# Patient Record
Sex: MALE | Race: WHITE | NOT HISPANIC OR LATINO | Employment: OTHER | URBAN - METROPOLITAN AREA
[De-identification: names, ages, dates, MRNs, and addresses within clinical notes are randomized per-mention and may not be internally consistent; named-entity substitution may affect disease eponyms.]

---

## 2017-02-17 ENCOUNTER — RECORDS - HEALTHEAST (OUTPATIENT)
Dept: LAB | Facility: CLINIC | Age: 57
End: 2017-02-17

## 2017-02-17 LAB
CHOLEST SERPL-MCNC: 192 MG/DL
FASTING STATUS PATIENT QL REPORTED: YES
HDLC SERPL-MCNC: 48 MG/DL
LDLC SERPL CALC-MCNC: 126 MG/DL
TRIGL SERPL-MCNC: 89 MG/DL

## 2018-01-08 ENCOUNTER — RECORDS - HEALTHEAST (OUTPATIENT)
Dept: LAB | Facility: CLINIC | Age: 58
End: 2018-01-08

## 2018-01-08 LAB
ALBUMIN SERPL-MCNC: 3.8 G/DL (ref 3.5–5)
ALBUMIN UR-MCNC: ABNORMAL MG/DL
ALP SERPL-CCNC: 88 U/L (ref 45–120)
ALT SERPL W P-5'-P-CCNC: 42 U/L (ref 0–45)
ANION GAP SERPL CALCULATED.3IONS-SCNC: 10 MMOL/L (ref 5–18)
APPEARANCE UR: CLEAR
AST SERPL W P-5'-P-CCNC: 17 U/L (ref 0–40)
BACTERIA #/AREA URNS HPF: ABNORMAL HPF
BILIRUB SERPL-MCNC: 0.4 MG/DL (ref 0–1)
BILIRUB UR QL STRIP: NEGATIVE
BUN SERPL-MCNC: 29 MG/DL (ref 8–22)
CALCIUM SERPL-MCNC: 9.3 MG/DL (ref 8.5–10.5)
CHLORIDE BLD-SCNC: 111 MMOL/L (ref 98–107)
CO2 SERPL-SCNC: 18 MMOL/L (ref 22–31)
COLOR UR AUTO: YELLOW
CREAT SERPL-MCNC: 2.7 MG/DL (ref 0.7–1.3)
GFR SERPL CREATININE-BSD FRML MDRD: 25 ML/MIN/1.73M2
GLUCOSE BLD-MCNC: 102 MG/DL (ref 70–125)
GLUCOSE UR STRIP-MCNC: NEGATIVE MG/DL
HGB UR QL STRIP: NEGATIVE
KETONES UR STRIP-MCNC: NEGATIVE MG/DL
LEUKOCYTE ESTERASE UR QL STRIP: ABNORMAL
MUCOUS THREADS #/AREA URNS LPF: ABNORMAL LPF
NITRATE UR QL: NEGATIVE
PH UR STRIP: 6 [PH] (ref 4.5–8)
POTASSIUM BLD-SCNC: 5.4 MMOL/L (ref 3.5–5)
PROT SERPL-MCNC: 7.1 G/DL (ref 6–8)
RBC #/AREA URNS AUTO: ABNORMAL HPF
SODIUM SERPL-SCNC: 139 MMOL/L (ref 136–145)
SP GR UR STRIP: 1.01 (ref 1–1.03)
SQUAMOUS #/AREA URNS AUTO: ABNORMAL LPF
UROBILINOGEN UR STRIP-ACNC: ABNORMAL
WBC #/AREA URNS AUTO: ABNORMAL HPF

## 2018-01-09 LAB — BACTERIA SPEC CULT: NO GROWTH

## 2018-02-09 ENCOUNTER — RECORDS - HEALTHEAST (OUTPATIENT)
Dept: LAB | Facility: CLINIC | Age: 58
End: 2018-02-09

## 2018-02-09 LAB
ANION GAP SERPL CALCULATED.3IONS-SCNC: 11 MMOL/L (ref 5–18)
BUN SERPL-MCNC: 41 MG/DL (ref 8–22)
CALCIUM SERPL-MCNC: 9.2 MG/DL (ref 8.5–10.5)
CHLORIDE BLD-SCNC: 112 MMOL/L (ref 98–107)
CO2 SERPL-SCNC: 17 MMOL/L (ref 22–31)
CREAT SERPL-MCNC: 2.99 MG/DL (ref 0.7–1.3)
GFR SERPL CREATININE-BSD FRML MDRD: 22 ML/MIN/1.73M2
GLUCOSE BLD-MCNC: 98 MG/DL (ref 70–125)
POTASSIUM BLD-SCNC: 5.8 MMOL/L (ref 3.5–5)
SODIUM SERPL-SCNC: 140 MMOL/L (ref 136–145)

## 2018-03-22 ENCOUNTER — RECORDS - HEALTHEAST (OUTPATIENT)
Dept: LAB | Facility: CLINIC | Age: 58
End: 2018-03-22

## 2018-03-22 LAB
ALBUMIN SERPL-MCNC: 3.9 G/DL (ref 3.5–5)
ALP SERPL-CCNC: 77 U/L (ref 45–120)
ALT SERPL W P-5'-P-CCNC: 30 U/L (ref 0–45)
ANION GAP SERPL CALCULATED.3IONS-SCNC: 10 MMOL/L (ref 5–18)
AST SERPL W P-5'-P-CCNC: 19 U/L (ref 0–40)
BILIRUB SERPL-MCNC: 0.8 MG/DL (ref 0–1)
BUN SERPL-MCNC: 37 MG/DL (ref 8–22)
CALCIUM SERPL-MCNC: 9.5 MG/DL (ref 8.5–10.5)
CHLORIDE BLD-SCNC: 112 MMOL/L (ref 98–107)
CO2 SERPL-SCNC: 19 MMOL/L (ref 22–31)
CREAT SERPL-MCNC: 2.86 MG/DL (ref 0.7–1.3)
GFR SERPL CREATININE-BSD FRML MDRD: 23 ML/MIN/1.73M2
GLUCOSE BLD-MCNC: 97 MG/DL (ref 70–125)
POTASSIUM BLD-SCNC: 4.7 MMOL/L (ref 3.5–5)
PROT SERPL-MCNC: 7.1 G/DL (ref 6–8)
SODIUM SERPL-SCNC: 141 MMOL/L (ref 136–145)

## 2018-03-26 LAB — BACTERIA SPEC CULT: ABNORMAL

## 2018-04-11 ENCOUNTER — RECORDS - HEALTHEAST (OUTPATIENT)
Dept: LAB | Facility: CLINIC | Age: 58
End: 2018-04-11

## 2018-04-12 LAB — BACTERIA SPEC CULT: NO GROWTH

## 2018-06-08 ENCOUNTER — RECORDS - HEALTHEAST (OUTPATIENT)
Dept: LAB | Facility: CLINIC | Age: 58
End: 2018-06-08

## 2018-06-08 LAB
ANION GAP SERPL CALCULATED.3IONS-SCNC: 13 MMOL/L (ref 5–18)
BUN SERPL-MCNC: 30 MG/DL (ref 8–22)
CALCIUM SERPL-MCNC: 9.3 MG/DL (ref 8.5–10.5)
CHLORIDE BLD-SCNC: 112 MMOL/L (ref 98–107)
CO2 SERPL-SCNC: 16 MMOL/L (ref 22–31)
CREAT SERPL-MCNC: 3.12 MG/DL (ref 0.7–1.3)
GFR SERPL CREATININE-BSD FRML MDRD: 21 ML/MIN/1.73M2
GLUCOSE BLD-MCNC: 110 MG/DL (ref 70–125)
POTASSIUM BLD-SCNC: 5 MMOL/L (ref 3.5–5)
SODIUM SERPL-SCNC: 141 MMOL/L (ref 136–145)

## 2018-11-02 ENCOUNTER — RECORDS - HEALTHEAST (OUTPATIENT)
Dept: LAB | Facility: CLINIC | Age: 58
End: 2018-11-02

## 2018-11-02 LAB
ANION GAP SERPL CALCULATED.3IONS-SCNC: 9 MMOL/L (ref 5–18)
BUN SERPL-MCNC: 36 MG/DL (ref 8–22)
CALCIUM SERPL-MCNC: 8.9 MG/DL (ref 8.5–10.5)
CHLORIDE BLD-SCNC: 112 MMOL/L (ref 98–107)
CO2 SERPL-SCNC: 19 MMOL/L (ref 22–31)
CREAT SERPL-MCNC: 3.1 MG/DL (ref 0.7–1.3)
GFR SERPL CREATININE-BSD FRML MDRD: 21 ML/MIN/1.73M2
GLUCOSE BLD-MCNC: 108 MG/DL (ref 70–125)
POTASSIUM BLD-SCNC: 4.6 MMOL/L (ref 3.5–5)
SODIUM SERPL-SCNC: 140 MMOL/L (ref 136–145)

## 2019-01-23 ENCOUNTER — RECORDS - HEALTHEAST (OUTPATIENT)
Dept: LAB | Facility: CLINIC | Age: 59
End: 2019-01-23

## 2019-01-23 LAB
ANION GAP SERPL CALCULATED.3IONS-SCNC: 10 MMOL/L (ref 5–18)
BUN SERPL-MCNC: 25 MG/DL (ref 8–22)
CALCIUM SERPL-MCNC: 9.2 MG/DL (ref 8.5–10.5)
CHLORIDE BLD-SCNC: 109 MMOL/L (ref 98–107)
CO2 SERPL-SCNC: 19 MMOL/L (ref 22–31)
CREAT SERPL-MCNC: 3.7 MG/DL (ref 0.7–1.3)
GFR SERPL CREATININE-BSD FRML MDRD: 17 ML/MIN/1.73M2
GLUCOSE BLD-MCNC: 101 MG/DL (ref 70–125)
POTASSIUM BLD-SCNC: 5.6 MMOL/L (ref 3.5–5)
SODIUM SERPL-SCNC: 138 MMOL/L (ref 136–145)

## 2019-04-19 ENCOUNTER — RECORDS - HEALTHEAST (OUTPATIENT)
Dept: LAB | Facility: CLINIC | Age: 59
End: 2019-04-19

## 2019-04-19 LAB
ALBUMIN SERPL-MCNC: 3.8 G/DL (ref 3.5–5)
ALBUMIN SERPL-MCNC: 3.8 G/DL (ref 3.5–5)
ALP SERPL-CCNC: 72 U/L (ref 45–120)
ALT SERPL W P-5'-P-CCNC: <9 U/L (ref 0–45)
ANION GAP SERPL CALCULATED.3IONS-SCNC: 11 MMOL/L (ref 5–18)
AST SERPL W P-5'-P-CCNC: 13 U/L (ref 0–40)
BASOPHILS # BLD AUTO: 0.1 THOU/UL (ref 0–0.2)
BASOPHILS NFR BLD AUTO: 1 % (ref 0–2)
BILIRUB DIRECT SERPL-MCNC: 0.1 MG/DL
BILIRUB SERPL-MCNC: 0.4 MG/DL (ref 0–1)
BUN SERPL-MCNC: 36 MG/DL (ref 8–22)
CALCIUM SERPL-MCNC: 9.5 MG/DL (ref 8.5–10.5)
CHLORIDE BLD-SCNC: 113 MMOL/L (ref 98–107)
CO2 SERPL-SCNC: 16 MMOL/L (ref 22–31)
CREAT SERPL-MCNC: 3.15 MG/DL (ref 0.7–1.3)
CREAT UR-MCNC: 95.3 MG/DL
EOSINOPHIL # BLD AUTO: 0.2 THOU/UL (ref 0–0.4)
EOSINOPHIL NFR BLD AUTO: 2 % (ref 0–6)
ERYTHROCYTE [DISTWIDTH] IN BLOOD BY AUTOMATED COUNT: 13.4 % (ref 11–14.5)
GFR SERPL CREATININE-BSD FRML MDRD: 20 ML/MIN/1.73M2
GLUCOSE BLD-MCNC: 100 MG/DL (ref 70–125)
HCT VFR BLD AUTO: 42.8 % (ref 40–54)
HGB BLD-MCNC: 13.4 G/DL (ref 14–18)
LYMPHOCYTES # BLD AUTO: 2.3 THOU/UL (ref 0.8–4.4)
LYMPHOCYTES NFR BLD AUTO: 25 % (ref 20–40)
MCH RBC QN AUTO: 29.3 PG (ref 27–34)
MCHC RBC AUTO-ENTMCNC: 31.3 G/DL (ref 32–36)
MCV RBC AUTO: 93 FL (ref 80–100)
MONOCYTES # BLD AUTO: 0.6 THOU/UL (ref 0–0.9)
MONOCYTES NFR BLD AUTO: 7 % (ref 2–10)
NEUTROPHILS # BLD AUTO: 5.8 THOU/UL (ref 2–7.7)
NEUTROPHILS NFR BLD AUTO: 65 % (ref 50–70)
PHOSPHATE SERPL-MCNC: 3.8 MG/DL (ref 2.5–4.5)
PLATELET # BLD AUTO: 276 THOU/UL (ref 140–440)
PMV BLD AUTO: 9.7 FL (ref 8.5–12.5)
POTASSIUM BLD-SCNC: 5.2 MMOL/L (ref 3.5–5)
PROT SERPL-MCNC: 6.9 G/DL (ref 6–8)
PROTEIN, RANDOM URINE - HISTORICAL: 15 MG/DL
PROTEIN/CREAT RATIO, RANDOM UR: 0.16
PTH-INTACT SERPL-MCNC: 249 PG/ML (ref 10–86)
RBC # BLD AUTO: 4.58 MILL/UL (ref 4.4–6.2)
SODIUM SERPL-SCNC: 140 MMOL/L (ref 136–145)
WBC: 9 THOU/UL (ref 4–11)

## 2019-04-22 LAB — 25(OH)D3 SERPL-MCNC: 35.1 NG/ML (ref 30–80)

## 2019-06-26 ENCOUNTER — RECORDS - HEALTHEAST (OUTPATIENT)
Dept: ADMINISTRATIVE | Facility: OTHER | Age: 59
End: 2019-06-26

## 2019-07-02 ENCOUNTER — AMBULATORY - HEALTHEAST (OUTPATIENT)
Dept: SURGERY | Facility: CLINIC | Age: 59
End: 2019-07-02

## 2019-07-02 RX ORDER — SODIUM BICARBONATE 650 MG/1
650 TABLET ORAL 3 TIMES DAILY
Status: SHIPPED | COMMUNITY
Start: 2019-07-02

## 2019-07-02 RX ORDER — LISINOPRIL 20 MG/1
20 TABLET ORAL DAILY
Status: SHIPPED | COMMUNITY
Start: 2019-07-02 | End: 2024-01-01

## 2019-07-02 RX ORDER — METOPROLOL SUCCINATE 50 MG/1
50 TABLET, EXTENDED RELEASE ORAL DAILY
Status: SHIPPED | COMMUNITY
Start: 2019-07-02 | End: 2024-01-01

## 2019-07-02 RX ORDER — AMLODIPINE BESYLATE 10 MG/1
10 TABLET ORAL DAILY
Status: SHIPPED | COMMUNITY
Start: 2019-07-02

## 2019-07-12 ENCOUNTER — OFFICE VISIT - HEALTHEAST (OUTPATIENT)
Dept: SURGERY | Facility: CLINIC | Age: 59
End: 2019-07-12

## 2019-07-12 DIAGNOSIS — K40.90 LEFT INGUINAL HERNIA: ICD-10-CM

## 2019-07-12 ASSESSMENT — MIFFLIN-ST. JEOR: SCORE: 1372.38

## 2020-05-04 ENCOUNTER — RECORDS - HEALTHEAST (OUTPATIENT)
Dept: LAB | Facility: CLINIC | Age: 60
End: 2020-05-04

## 2020-05-04 LAB
ALBUMIN SERPL-MCNC: 4.1 G/DL (ref 3.5–5)
ALBUMIN UR-MCNC: ABNORMAL MG/DL
ANION GAP SERPL CALCULATED.3IONS-SCNC: 11 MMOL/L (ref 5–18)
APPEARANCE UR: ABNORMAL
BACTERIA #/AREA URNS HPF: ABNORMAL HPF
BILIRUB UR QL STRIP: NEGATIVE
BUN SERPL-MCNC: 26 MG/DL (ref 8–22)
CALCIUM SERPL-MCNC: 9.8 MG/DL (ref 8.5–10.5)
CHLORIDE BLD-SCNC: 112 MMOL/L (ref 98–107)
CO2 SERPL-SCNC: 18 MMOL/L (ref 22–31)
COLOR UR AUTO: ABNORMAL
CREAT SERPL-MCNC: 2.76 MG/DL (ref 0.7–1.3)
CREAT UR-MCNC: 71 MG/DL
GFR SERPL CREATININE-BSD FRML MDRD: 24 ML/MIN/1.73M2
GLUCOSE BLD-MCNC: 103 MG/DL (ref 70–125)
GLUCOSE UR STRIP-MCNC: NEGATIVE MG/DL
HGB BLD-MCNC: 14.7 G/DL (ref 14–18)
HGB UR QL STRIP: NEGATIVE
KETONES UR STRIP-MCNC: NEGATIVE MG/DL
LEUKOCYTE ESTERASE UR QL STRIP: ABNORMAL
MICROALBUMIN UR-MCNC: 2.7 MG/DL (ref 0–1.99)
MICROALBUMIN/CREAT UR: 38 MG/G
NITRATE UR QL: NEGATIVE
PH UR STRIP: 6 [PH] (ref 4.5–8)
PHOSPHATE SERPL-MCNC: 2.6 MG/DL (ref 2.5–4.5)
POTASSIUM BLD-SCNC: 5.7 MMOL/L (ref 3.5–5)
PTH-INTACT SERPL-MCNC: 212 PG/ML (ref 10–86)
RBC #/AREA URNS AUTO: ABNORMAL HPF
SODIUM SERPL-SCNC: 141 MMOL/L (ref 136–145)
SP GR UR STRIP: 1.01 (ref 1–1.03)
SQUAMOUS #/AREA URNS AUTO: ABNORMAL LPF
UROBILINOGEN UR STRIP-ACNC: ABNORMAL
WBC #/AREA URNS AUTO: ABNORMAL HPF
WBC CLUMPS #/AREA URNS HPF: PRESENT /[HPF]

## 2020-07-09 ENCOUNTER — RECORDS - HEALTHEAST (OUTPATIENT)
Dept: LAB | Facility: CLINIC | Age: 60
End: 2020-07-09

## 2020-07-09 LAB
ALBUMIN SERPL-MCNC: 3.8 G/DL (ref 3.5–5)
ANION GAP SERPL CALCULATED.3IONS-SCNC: 10 MMOL/L (ref 5–18)
BUN SERPL-MCNC: 35 MG/DL (ref 8–22)
CALCIUM SERPL-MCNC: 9.7 MG/DL (ref 8.5–10.5)
CHLORIDE BLD-SCNC: 112 MMOL/L (ref 98–107)
CO2 SERPL-SCNC: 17 MMOL/L (ref 22–31)
CREAT SERPL-MCNC: 3.14 MG/DL (ref 0.7–1.3)
GFR SERPL CREATININE-BSD FRML MDRD: 20 ML/MIN/1.73M2
GLUCOSE BLD-MCNC: 119 MG/DL (ref 70–125)
PHOSPHATE SERPL-MCNC: 4.4 MG/DL (ref 2.5–4.5)
POTASSIUM BLD-SCNC: 5.8 MMOL/L (ref 3.5–5)
SODIUM SERPL-SCNC: 139 MMOL/L (ref 136–145)

## 2020-07-21 ENCOUNTER — RECORDS - HEALTHEAST (OUTPATIENT)
Dept: LAB | Facility: CLINIC | Age: 60
End: 2020-07-21

## 2020-07-21 LAB
ALBUMIN SERPL-MCNC: 3.9 G/DL (ref 3.5–5)
ANION GAP SERPL CALCULATED.3IONS-SCNC: 9 MMOL/L (ref 5–18)
BUN SERPL-MCNC: 47 MG/DL (ref 8–22)
CALCIUM SERPL-MCNC: 9.6 MG/DL (ref 8.5–10.5)
CHLORIDE BLD-SCNC: 113 MMOL/L (ref 98–107)
CO2 SERPL-SCNC: 16 MMOL/L (ref 22–31)
CREAT SERPL-MCNC: 3.58 MG/DL (ref 0.7–1.3)
GFR SERPL CREATININE-BSD FRML MDRD: 18 ML/MIN/1.73M2
GLUCOSE BLD-MCNC: 173 MG/DL (ref 70–125)
PHOSPHATE SERPL-MCNC: 3.6 MG/DL (ref 2.5–4.5)
POTASSIUM BLD-SCNC: 5.2 MMOL/L (ref 3.5–5)
SODIUM SERPL-SCNC: 138 MMOL/L (ref 136–145)

## 2020-09-25 ENCOUNTER — RECORDS - HEALTHEAST (OUTPATIENT)
Dept: LAB | Facility: CLINIC | Age: 60
End: 2020-09-25

## 2020-09-25 LAB
ALBUMIN SERPL-MCNC: 3.7 G/DL (ref 3.5–5)
ANION GAP SERPL CALCULATED.3IONS-SCNC: 9 MMOL/L (ref 5–18)
BUN SERPL-MCNC: 30 MG/DL (ref 8–22)
CALCIUM SERPL-MCNC: 8.9 MG/DL (ref 8.5–10.5)
CHLORIDE BLD-SCNC: 111 MMOL/L (ref 98–107)
CO2 SERPL-SCNC: 20 MMOL/L (ref 22–31)
CREAT SERPL-MCNC: 3.49 MG/DL (ref 0.7–1.3)
GFR SERPL CREATININE-BSD FRML MDRD: 18 ML/MIN/1.73M2
GLUCOSE BLD-MCNC: 90 MG/DL (ref 70–125)
PHOSPHATE SERPL-MCNC: 3.3 MG/DL (ref 2.5–4.5)
POTASSIUM BLD-SCNC: 4.6 MMOL/L (ref 3.5–5)
SODIUM SERPL-SCNC: 140 MMOL/L (ref 136–145)

## 2020-11-06 ENCOUNTER — RECORDS - HEALTHEAST (OUTPATIENT)
Dept: LAB | Facility: CLINIC | Age: 60
End: 2020-11-06

## 2020-11-06 LAB
ALBUMIN SERPL-MCNC: 3.8 G/DL (ref 3.5–5)
ANION GAP SERPL CALCULATED.3IONS-SCNC: 11 MMOL/L (ref 5–18)
BUN SERPL-MCNC: 34 MG/DL (ref 8–22)
CALCIUM SERPL-MCNC: 9 MG/DL (ref 8.5–10.5)
CHLORIDE BLD-SCNC: 112 MMOL/L (ref 98–107)
CHOLEST SERPL-MCNC: 187 MG/DL
CO2 SERPL-SCNC: 19 MMOL/L (ref 22–31)
CREAT SERPL-MCNC: 3.32 MG/DL (ref 0.7–1.3)
FASTING STATUS PATIENT QL REPORTED: NO
GFR SERPL CREATININE-BSD FRML MDRD: 19 ML/MIN/1.73M2
GLUCOSE BLD-MCNC: 86 MG/DL (ref 70–125)
HDLC SERPL-MCNC: 53 MG/DL
LDLC SERPL CALC-MCNC: 110 MG/DL
PHOSPHATE SERPL-MCNC: 3.8 MG/DL (ref 2.5–4.5)
POTASSIUM BLD-SCNC: 4.5 MMOL/L (ref 3.5–5)
SODIUM SERPL-SCNC: 142 MMOL/L (ref 136–145)
TRIGL SERPL-MCNC: 119 MG/DL

## 2021-05-27 ENCOUNTER — RECORDS - HEALTHEAST (OUTPATIENT)
Dept: ADMINISTRATIVE | Facility: CLINIC | Age: 61
End: 2021-05-27

## 2021-05-30 ENCOUNTER — RECORDS - HEALTHEAST (OUTPATIENT)
Dept: ADMINISTRATIVE | Facility: CLINIC | Age: 61
End: 2021-05-30

## 2021-05-30 NOTE — PROGRESS NOTES
"HPI:  This is a 58 y.o. male here today with concerns of pain and bulging in his left groin area. He has noted this for the past few month(s). The symptoms have progressed and increased over this time. He comes in for evaluation secondary to the hernia causing enough issues to bother them with daily activities or chores.    Allergies:Patient has no known allergies.    History reviewed. No pertinent past medical history.    Past Surgical History:   Procedure Laterality Date     BPH w/LUTS  02/02/2018     COLONOSCOPY       Cysto Dilate Stricture  02/13/2018     DIsc repair  12/2003     Elevated PSA  2012     Incomplete Bladder Emptying  2013     TRANSURETHRAL RESECTION OF PROSTATE  2013     trauma urethral stricture  02/13/2018       CURRENT MEDS:      Family History   Problem Relation Age of Onset     Arthritis Mother      Asthma Sister      Heart disease Brother         reports that he has been smoking.  He has never used smokeless tobacco. He reports that he drinks alcohol. He reports that he does not use drugs.    Review of Systems - Negative except left groin, bulge and pain. Otherwise twelve system of review is negative.      Vitals:    07/12/19 1118   BP: 116/74   Pulse: 64   Resp: 14   SpO2: 96%   Weight: 125 lb (56.7 kg)   Height: 5' 9\" (1.753 m)       Body mass index is 18.46 kg/m .    EXAM:  General: NAD   HEENT: normocephalic, PERRLA and EOMS intact  Mounth: Mucus membranes moist  Neck: Supple  Chest: Clear to auscultation bilaterally  CV: RRR  ABD: Soft nontender and nondistended, left inguinal hernia, reducible  EXT: Warm, pulses intact,   Neuro: Alert and oriented x3  Back: no CVA tenderness      IMAGES: none    Assessment/Plan: Pt with a left inguinal hernia. I discussed this at length with He.  I went over conservative management as well as surgical treatment of this.. I would plan on doing this via anlaparoscopic  approach with possible use of mesh. I went over the small risks of surgery including " but not limited to bleeding and infection, anesthesia, recurrence rates and nerve injury. I discussed the expected recovery time as well. Will get this scheduled. He will contact us to have this scheduled.      Lucas Allen MD  [facility] Department of Surgery

## 2021-06-03 VITALS — BODY MASS INDEX: 18.51 KG/M2 | WEIGHT: 125 LBS | HEIGHT: 69 IN

## 2021-06-19 NOTE — LETTER
Letter by Lucas Allen MD at      Author: Lucas Allen MD Service: -- Author Type: --    Filed:  Encounter Date: 7/12/2019 Status: (Other)         Fausto Vicente MD  [address] [place] [address]                                  July 29, 2019    Patient: Elie Prado   MR Number: [id number]   YOB: 1960   Date of Visit: 7/12/2019     Dear Dr. Cinthia MD:    Thank you for referring Elie Prado to me for evaluation. Below are the relevant portions of my assessment and plan of care.    If you have questions, please do not hesitate to call me. I look forward to following Elie along with you.    Sincerely,        Lucas Allen MD          CC  No Recipients  Lucas Allen MD  7/29/2019  8:53 AM  Sign at close encounter  HPI:  This is a 58 y.o. male here today with concerns of pain and bulging in his left groin area. He has noted this for the past few month(s). The symptoms have progressed and increased over this time. He comes in for evaluation secondary to the hernia causing enough issues to bother them with daily activities or chores.    Allergies:Patient has no known allergies.    History reviewed. No pertinent past medical history.    Past Surgical History:   Procedure Laterality Date   ? BPH w/LUTS  02/02/2018   ? COLONOSCOPY     ? Cysto Dilate Stricture  02/13/2018   ? DIsc repair  12/2003   ? Elevated PSA  2012   ? Incomplete Bladder Emptying  2013   ? TRANSURETHRAL RESECTION OF PROSTATE  2013   ? trauma urethral stricture  02/13/2018       CURRENT MEDS:      Family History   Problem Relation Age of Onset   ? Arthritis Mother    ? Asthma Sister    ? Heart disease Brother         reports that he has been smoking.  He has never used smokeless tobacco. He reports that he drinks alcohol. He reports that he does not use drugs.    Review of Systems - Negative except left groin, bulge and pain. Otherwise twelve system of review is negative.      Vitals:    07/12/19  "1118   BP: 116/74   Pulse: 64   Resp: 14   SpO2: 96%   Weight: 125 lb (56.7 kg)   Height: 5' 9\" (1.753 m)       Body mass index is 18.46 kg/m .    EXAM:  General: NAD   HEENT: normocephalic, PERRLA and EOMS intact  Mounth: Mucus membranes moist  Neck: Supple  Chest: Clear to auscultation bilaterally  CV: RRR  ABD: Soft nontender and nondistended, left inguinal hernia, reducible  EXT: Warm, pulses intact,   Neuro: Alert and oriented x3  Back: no CVA tenderness      IMAGES: none    Assessment/Plan: Pt with a left inguinal hernia. I discussed this at length with He.  I went over conservative management as well as surgical treatment of this.. I would plan on doing this via anlaparoscopic  approach with possible use of mesh. I went over the small risks of surgery including but not limited to bleeding and infection, anesthesia, recurrence rates and nerve injury. I discussed the expected recovery time as well. Will get this scheduled. He will contact us to have this scheduled.      Lucas Allen MD  [facility] Department of Surgery       "

## 2021-07-14 ENCOUNTER — LAB REQUISITION (OUTPATIENT)
Dept: LAB | Facility: CLINIC | Age: 61
End: 2021-07-14

## 2021-07-14 DIAGNOSIS — I12.9 HYPERTENSIVE CHRONIC KIDNEY DISEASE WITH STAGE 1 THROUGH STAGE 4 CHRONIC KIDNEY DISEASE, OR UNSPECIFIED CHRONIC KIDNEY DISEASE: ICD-10-CM

## 2021-07-14 DIAGNOSIS — R82.998 OTHER ABNORMAL FINDINGS IN URINE: ICD-10-CM

## 2021-07-14 LAB
ANION GAP SERPL CALCULATED.3IONS-SCNC: 13 MMOL/L (ref 5–18)
BUN SERPL-MCNC: 26 MG/DL (ref 8–22)
CALCIUM SERPL-MCNC: 8.8 MG/DL (ref 8.5–10.5)
CHLORIDE BLD-SCNC: 109 MMOL/L (ref 98–107)
CHOLEST SERPL-MCNC: 143 MG/DL
CO2 SERPL-SCNC: 17 MMOL/L (ref 22–31)
CREAT SERPL-MCNC: 3.97 MG/DL (ref 0.7–1.3)
GFR SERPL CREATININE-BSD FRML MDRD: 15 ML/MIN/1.73M2
GLUCOSE BLD-MCNC: 100 MG/DL (ref 70–125)
HDLC SERPL-MCNC: 46 MG/DL
LDLC SERPL CALC-MCNC: 82 MG/DL
POTASSIUM BLD-SCNC: 4.6 MMOL/L (ref 3.5–5)
SODIUM SERPL-SCNC: 139 MMOL/L (ref 136–145)
TRIGL SERPL-MCNC: 75 MG/DL

## 2021-07-14 PROCEDURE — 80061 LIPID PANEL: CPT | Performed by: FAMILY MEDICINE

## 2021-07-14 PROCEDURE — 80048 BASIC METABOLIC PNL TOTAL CA: CPT | Performed by: FAMILY MEDICINE

## 2021-07-14 PROCEDURE — 87086 URINE CULTURE/COLONY COUNT: CPT | Performed by: FAMILY MEDICINE

## 2021-07-17 LAB — BACTERIA UR CULT: ABNORMAL

## 2021-10-14 ENCOUNTER — LAB REQUISITION (OUTPATIENT)
Dept: LAB | Facility: CLINIC | Age: 61
End: 2021-10-14

## 2021-10-14 DIAGNOSIS — N18.4 CHRONIC KIDNEY DISEASE, STAGE 4 (SEVERE) (H): ICD-10-CM

## 2021-10-14 LAB
ALBUMIN SERPL-MCNC: 3.7 G/DL (ref 3.5–5)
ANION GAP SERPL CALCULATED.3IONS-SCNC: 11 MMOL/L (ref 5–18)
BUN SERPL-MCNC: 30 MG/DL (ref 8–22)
CALCIUM SERPL-MCNC: 9.5 MG/DL (ref 8.5–10.5)
CHLORIDE BLD-SCNC: 109 MMOL/L (ref 98–107)
CO2 SERPL-SCNC: 18 MMOL/L (ref 22–31)
CREAT SERPL-MCNC: 3.5 MG/DL (ref 0.7–1.3)
GFR SERPL CREATININE-BSD FRML MDRD: 18 ML/MIN/1.73M2
GLUCOSE BLD-MCNC: 85 MG/DL (ref 70–125)
PHOSPHATE SERPL-MCNC: 4.6 MG/DL (ref 2.5–4.5)
POTASSIUM BLD-SCNC: 5.4 MMOL/L (ref 3.5–5)
SODIUM SERPL-SCNC: 138 MMOL/L (ref 136–145)

## 2021-10-14 PROCEDURE — 80069 RENAL FUNCTION PANEL: CPT | Performed by: FAMILY MEDICINE

## 2022-01-03 ENCOUNTER — LAB REQUISITION (OUTPATIENT)
Dept: LAB | Facility: CLINIC | Age: 62
End: 2022-01-03

## 2022-01-03 DIAGNOSIS — I12.9 HYPERTENSIVE CHRONIC KIDNEY DISEASE WITH STAGE 1 THROUGH STAGE 4 CHRONIC KIDNEY DISEASE, OR UNSPECIFIED CHRONIC KIDNEY DISEASE: ICD-10-CM

## 2022-01-03 LAB
ALBUMIN SERPL-MCNC: 3.7 G/DL (ref 3.5–5)
ANION GAP SERPL CALCULATED.3IONS-SCNC: 10 MMOL/L (ref 5–18)
BUN SERPL-MCNC: 37 MG/DL (ref 8–22)
CALCIUM SERPL-MCNC: 9.5 MG/DL (ref 8.5–10.5)
CHLORIDE BLD-SCNC: 107 MMOL/L (ref 98–107)
CO2 SERPL-SCNC: 19 MMOL/L (ref 22–31)
CREAT SERPL-MCNC: 3.49 MG/DL (ref 0.7–1.3)
GFR SERPL CREATININE-BSD FRML MDRD: 19 ML/MIN/1.73M2
GLUCOSE BLD-MCNC: 105 MG/DL (ref 70–125)
PHOSPHATE SERPL-MCNC: 4.4 MG/DL (ref 2.5–4.5)
POTASSIUM BLD-SCNC: 4.9 MMOL/L (ref 3.5–5)
SODIUM SERPL-SCNC: 136 MMOL/L (ref 136–145)

## 2022-01-03 PROCEDURE — 80069 RENAL FUNCTION PANEL: CPT | Performed by: FAMILY MEDICINE

## 2022-07-06 ENCOUNTER — LAB REQUISITION (OUTPATIENT)
Dept: LAB | Facility: CLINIC | Age: 62
End: 2022-07-06

## 2022-07-06 DIAGNOSIS — N18.4 CHRONIC KIDNEY DISEASE, STAGE 4 (SEVERE) (H): ICD-10-CM

## 2022-07-06 LAB
ALBUMIN SERPL BCG-MCNC: 4.3 G/DL (ref 3.5–5.2)
ANION GAP SERPL CALCULATED.3IONS-SCNC: 12 MMOL/L (ref 7–15)
BUN SERPL-MCNC: 20.5 MG/DL (ref 8–23)
CALCIUM SERPL-MCNC: 9.5 MG/DL (ref 8.8–10.2)
CHLORIDE SERPL-SCNC: 106 MMOL/L (ref 98–107)
CREAT SERPL-MCNC: 3.21 MG/DL (ref 0.67–1.17)
DEPRECATED HCO3 PLAS-SCNC: 21 MMOL/L (ref 22–29)
GFR SERPL CREATININE-BSD FRML MDRD: 21 ML/MIN/1.73M2
GLUCOSE SERPL-MCNC: 125 MG/DL (ref 70–99)
PHOSPHATE SERPL-MCNC: 3.5 MG/DL (ref 2.5–4.5)
POTASSIUM SERPL-SCNC: 5.2 MMOL/L (ref 3.4–5.3)
SODIUM SERPL-SCNC: 139 MMOL/L (ref 136–145)

## 2022-07-06 PROCEDURE — 80051 ELECTROLYTE PANEL: CPT | Performed by: FAMILY MEDICINE

## 2023-01-01 ENCOUNTER — LAB REQUISITION (OUTPATIENT)
Dept: LAB | Facility: CLINIC | Age: 63
End: 2023-01-01

## 2023-01-01 DIAGNOSIS — Z12.5 ENCOUNTER FOR SCREENING FOR MALIGNANT NEOPLASM OF PROSTATE: ICD-10-CM

## 2023-01-01 DIAGNOSIS — N18.4 CHRONIC KIDNEY DISEASE, STAGE 4 (SEVERE) (H): ICD-10-CM

## 2023-01-01 DIAGNOSIS — I12.9 HYPERTENSIVE CHRONIC KIDNEY DISEASE WITH STAGE 1 THROUGH STAGE 4 CHRONIC KIDNEY DISEASE, OR UNSPECIFIED CHRONIC KIDNEY DISEASE: ICD-10-CM

## 2023-01-01 DIAGNOSIS — E83.52 HYPERCALCEMIA: ICD-10-CM

## 2023-01-01 DIAGNOSIS — R74.8 ABNORMAL LEVELS OF OTHER SERUM ENZYMES: ICD-10-CM

## 2023-01-01 DIAGNOSIS — E55.9 VITAMIN D DEFICIENCY, UNSPECIFIED: ICD-10-CM

## 2023-01-01 DIAGNOSIS — E78.5 HYPERLIPIDEMIA, UNSPECIFIED: ICD-10-CM

## 2023-01-01 LAB
1,25(OH)2D SERPL-MCNC: <5 PG/ML (ref 19.9–79.3)
ALBUMIN SERPL BCG-MCNC: 3.8 G/DL (ref 3.5–5.2)
ALBUMIN SERPL BCG-MCNC: 4 G/DL (ref 3.5–5.2)
ALP SERPL-CCNC: 294 U/L (ref 40–129)
ALP SERPL-CCNC: 396 U/L (ref 40–129)
ALT SERPL W P-5'-P-CCNC: 133 U/L (ref 0–70)
ALT SERPL W P-5'-P-CCNC: 95 U/L (ref 0–70)
ANION GAP SERPL CALCULATED.3IONS-SCNC: 11 MMOL/L (ref 7–15)
ANION GAP SERPL CALCULATED.3IONS-SCNC: 14 MMOL/L (ref 7–15)
ANION GAP SERPL CALCULATED.3IONS-SCNC: 14 MMOL/L (ref 7–15)
AST SERPL W P-5'-P-CCNC: 40 U/L (ref 0–45)
AST SERPL W P-5'-P-CCNC: 60 U/L (ref 0–45)
BILIRUB SERPL-MCNC: 0.5 MG/DL
BILIRUB SERPL-MCNC: 0.8 MG/DL
BUN SERPL-MCNC: 42 MG/DL (ref 8–23)
BUN SERPL-MCNC: 46.9 MG/DL (ref 8–23)
BUN SERPL-MCNC: 55.6 MG/DL (ref 8–23)
CALCIUM SERPL-MCNC: 11.4 MG/DL (ref 8.8–10.2)
CALCIUM SERPL-MCNC: 11.4 MG/DL (ref 8.8–10.2)
CALCIUM SERPL-MCNC: 11.5 MG/DL (ref 8.8–10.2)
CHLORIDE SERPL-SCNC: 108 MMOL/L (ref 98–107)
CHLORIDE SERPL-SCNC: 108 MMOL/L (ref 98–107)
CHLORIDE SERPL-SCNC: 111 MMOL/L (ref 98–107)
CHOLEST SERPL-MCNC: 130 MG/DL
CREAT SERPL-MCNC: 2.71 MG/DL (ref 0.67–1.17)
CREAT SERPL-MCNC: 3.08 MG/DL (ref 0.67–1.17)
CREAT SERPL-MCNC: 3.08 MG/DL (ref 0.67–1.17)
DEPRECATED HCO3 PLAS-SCNC: 16 MMOL/L (ref 22–29)
DEPRECATED HCO3 PLAS-SCNC: 18 MMOL/L (ref 22–29)
DEPRECATED HCO3 PLAS-SCNC: 20 MMOL/L (ref 22–29)
EGFRCR SERPLBLD CKD-EPI 2021: 22 ML/MIN/1.73M2
EGFRCR SERPLBLD CKD-EPI 2021: 22 ML/MIN/1.73M2
EGFRCR SERPLBLD CKD-EPI 2021: 26 ML/MIN/1.73M2
GLUCOSE SERPL-MCNC: 118 MG/DL (ref 70–99)
GLUCOSE SERPL-MCNC: 120 MG/DL (ref 70–99)
GLUCOSE SERPL-MCNC: 120 MG/DL (ref 70–99)
HCV AB SERPL QL IA: NONREACTIVE
HDLC SERPL-MCNC: 64 MG/DL
LDLC SERPL CALC-MCNC: 56 MG/DL
NONHDLC SERPL-MCNC: 66 MG/DL
POTASSIUM SERPL-SCNC: 4.6 MMOL/L (ref 3.4–5.3)
POTASSIUM SERPL-SCNC: 5.4 MMOL/L (ref 3.4–5.3)
POTASSIUM SERPL-SCNC: 5.4 MMOL/L (ref 3.4–5.3)
PROT SERPL-MCNC: 6.6 G/DL (ref 6.4–8.3)
PROT SERPL-MCNC: 6.8 G/DL (ref 6.4–8.3)
PSA SERPL DL<=0.01 NG/ML-MCNC: 2.49 NG/ML (ref 0–4.5)
PTH-INTACT SERPL-MCNC: 52 PG/ML (ref 15–65)
SODIUM SERPL-SCNC: 138 MMOL/L (ref 135–145)
SODIUM SERPL-SCNC: 140 MMOL/L (ref 135–145)
SODIUM SERPL-SCNC: 142 MMOL/L (ref 135–145)
TRIGL SERPL-MCNC: 50 MG/DL
VIT D+METAB SERPL-MCNC: 63 NG/ML (ref 20–50)

## 2023-01-01 PROCEDURE — 86803 HEPATITIS C AB TEST: CPT | Performed by: FAMILY MEDICINE

## 2023-01-01 PROCEDURE — 82652 VIT D 1 25-DIHYDROXY: CPT | Performed by: FAMILY MEDICINE

## 2023-01-01 PROCEDURE — 80048 BASIC METABOLIC PNL TOTAL CA: CPT | Performed by: PHYSICIAN ASSISTANT

## 2023-01-01 PROCEDURE — 80061 LIPID PANEL: CPT | Performed by: FAMILY MEDICINE

## 2023-01-01 PROCEDURE — 80053 COMPREHEN METABOLIC PANEL: CPT | Performed by: FAMILY MEDICINE

## 2023-01-01 PROCEDURE — 82306 VITAMIN D 25 HYDROXY: CPT | Performed by: PHYSICIAN ASSISTANT

## 2023-01-01 PROCEDURE — G0103 PSA SCREENING: HCPCS | Performed by: FAMILY MEDICINE

## 2023-01-01 PROCEDURE — 83970 ASSAY OF PARATHORMONE: CPT | Performed by: FAMILY MEDICINE

## 2023-01-12 ENCOUNTER — LAB REQUISITION (OUTPATIENT)
Dept: LAB | Facility: CLINIC | Age: 63
End: 2023-01-12

## 2023-01-12 DIAGNOSIS — I12.9 HYPERTENSIVE CHRONIC KIDNEY DISEASE WITH STAGE 1 THROUGH STAGE 4 CHRONIC KIDNEY DISEASE, OR UNSPECIFIED CHRONIC KIDNEY DISEASE: ICD-10-CM

## 2023-01-12 DIAGNOSIS — E78.5 HYPERLIPIDEMIA, UNSPECIFIED: ICD-10-CM

## 2023-01-12 LAB
ALBUMIN SERPL BCG-MCNC: 4.1 G/DL (ref 3.5–5.2)
ANION GAP SERPL CALCULATED.3IONS-SCNC: 12 MMOL/L (ref 7–15)
BUN SERPL-MCNC: 32.5 MG/DL (ref 8–23)
CALCIUM SERPL-MCNC: 9.3 MG/DL (ref 8.8–10.2)
CHLORIDE SERPL-SCNC: 107 MMOL/L (ref 98–107)
CHOLEST SERPL-MCNC: 189 MG/DL
CREAT SERPL-MCNC: 3.21 MG/DL (ref 0.67–1.17)
DEPRECATED HCO3 PLAS-SCNC: 16 MMOL/L (ref 22–29)
GFR SERPL CREATININE-BSD FRML MDRD: 21 ML/MIN/1.73M2
GLUCOSE SERPL-MCNC: 105 MG/DL (ref 70–99)
HDLC SERPL-MCNC: 67 MG/DL
LDLC SERPL CALC-MCNC: 106 MG/DL
NONHDLC SERPL-MCNC: 122 MG/DL
PHOSPHATE SERPL-MCNC: 4.7 MG/DL (ref 2.5–4.5)
POTASSIUM SERPL-SCNC: 5.6 MMOL/L (ref 3.4–5.3)
SODIUM SERPL-SCNC: 135 MMOL/L (ref 136–145)
TRIGL SERPL-MCNC: 81 MG/DL

## 2023-01-12 PROCEDURE — 80061 LIPID PANEL: CPT | Performed by: FAMILY MEDICINE

## 2023-01-12 PROCEDURE — 80069 RENAL FUNCTION PANEL: CPT | Performed by: FAMILY MEDICINE

## 2023-04-13 ENCOUNTER — LAB REQUISITION (OUTPATIENT)
Dept: LAB | Facility: CLINIC | Age: 63
End: 2023-04-13

## 2023-04-13 DIAGNOSIS — E78.5 HYPERLIPIDEMIA, UNSPECIFIED: ICD-10-CM

## 2023-04-13 DIAGNOSIS — I12.9 HYPERTENSIVE CHRONIC KIDNEY DISEASE WITH STAGE 1 THROUGH STAGE 4 CHRONIC KIDNEY DISEASE, OR UNSPECIFIED CHRONIC KIDNEY DISEASE: ICD-10-CM

## 2023-04-13 DIAGNOSIS — E44.1 MILD PROTEIN-CALORIE MALNUTRITION (H): ICD-10-CM

## 2023-04-13 LAB
ALBUMIN SERPL BCG-MCNC: 4.4 G/DL (ref 3.5–5.2)
ALP SERPL-CCNC: 71 U/L (ref 40–129)
ALT SERPL W P-5'-P-CCNC: 10 U/L (ref 10–50)
ANION GAP SERPL CALCULATED.3IONS-SCNC: 15 MMOL/L (ref 7–15)
AST SERPL W P-5'-P-CCNC: 14 U/L (ref 10–50)
BILIRUB SERPL-MCNC: 0.4 MG/DL
BUN SERPL-MCNC: 23.4 MG/DL (ref 8–23)
CALCIUM SERPL-MCNC: 9.8 MG/DL (ref 8.8–10.2)
CHLORIDE SERPL-SCNC: 104 MMOL/L (ref 98–107)
CHOLEST SERPL-MCNC: 200 MG/DL
CREAT SERPL-MCNC: 3.27 MG/DL (ref 0.67–1.17)
DEPRECATED HCO3 PLAS-SCNC: 17 MMOL/L (ref 22–29)
GFR SERPL CREATININE-BSD FRML MDRD: 21 ML/MIN/1.73M2
GLUCOSE SERPL-MCNC: 103 MG/DL (ref 70–99)
HDLC SERPL-MCNC: 48 MG/DL
LDLC SERPL CALC-MCNC: 127 MG/DL
NONHDLC SERPL-MCNC: 152 MG/DL
POTASSIUM SERPL-SCNC: 5 MMOL/L (ref 3.4–5.3)
PROT SERPL-MCNC: 7 G/DL (ref 6.4–8.3)
SODIUM SERPL-SCNC: 136 MMOL/L (ref 136–145)
TRIGL SERPL-MCNC: 124 MG/DL

## 2023-04-13 PROCEDURE — 80053 COMPREHEN METABOLIC PANEL: CPT | Performed by: FAMILY MEDICINE

## 2023-04-13 PROCEDURE — 80061 LIPID PANEL: CPT | Performed by: FAMILY MEDICINE

## 2024-01-01 ENCOUNTER — ANESTHESIA EVENT (OUTPATIENT)
Dept: SURGERY | Facility: HOSPITAL | Age: 64
End: 2024-01-01
Payer: COMMERCIAL

## 2024-01-01 ENCOUNTER — HOSPITAL ENCOUNTER (INPATIENT)
Facility: CLINIC | Age: 64
LOS: 1 days | End: 2024-04-21
Admitting: INTERNAL MEDICINE
Payer: COMMERCIAL

## 2024-01-01 ENCOUNTER — APPOINTMENT (OUTPATIENT)
Dept: CARDIOLOGY | Facility: CLINIC | Age: 64
End: 2024-01-01
Attending: STUDENT IN AN ORGANIZED HEALTH CARE EDUCATION/TRAINING PROGRAM
Payer: COMMERCIAL

## 2024-01-01 ENCOUNTER — PRE VISIT (OUTPATIENT)
Dept: ENDOCRINOLOGY | Facility: CLINIC | Age: 64
End: 2024-01-01

## 2024-01-01 ENCOUNTER — ANESTHESIA (OUTPATIENT)
Dept: SURGERY | Facility: HOSPITAL | Age: 64
End: 2024-01-01
Payer: COMMERCIAL

## 2024-01-01 ENCOUNTER — APPOINTMENT (OUTPATIENT)
Dept: ULTRASOUND IMAGING | Facility: CLINIC | Age: 64
End: 2024-01-01
Attending: PHYSICIAN ASSISTANT
Payer: COMMERCIAL

## 2024-01-01 ENCOUNTER — HOSPITAL ENCOUNTER (OUTPATIENT)
Facility: HOSPITAL | Age: 64
Discharge: HOME | End: 2024-03-08
Attending: INTERNAL MEDICINE | Admitting: INTERNAL MEDICINE
Payer: COMMERCIAL

## 2024-01-01 ENCOUNTER — APPOINTMENT (OUTPATIENT)
Dept: GENERAL RADIOLOGY | Facility: CLINIC | Age: 64
End: 2024-01-01
Attending: STUDENT IN AN ORGANIZED HEALTH CARE EDUCATION/TRAINING PROGRAM
Payer: COMMERCIAL

## 2024-01-01 ENCOUNTER — TELEPHONE (OUTPATIENT)
Dept: ENDOCRINOLOGY | Facility: CLINIC | Age: 64
End: 2024-01-01

## 2024-01-01 ENCOUNTER — APPOINTMENT (OUTPATIENT)
Dept: CT IMAGING | Facility: CLINIC | Age: 64
End: 2024-01-01
Payer: COMMERCIAL

## 2024-01-01 ENCOUNTER — ANESTHESIA EVENT (OUTPATIENT)
Dept: SURGERY | Facility: CLINIC | Age: 64
End: 2024-01-01
Payer: COMMERCIAL

## 2024-01-01 ENCOUNTER — APPOINTMENT (OUTPATIENT)
Dept: CARDIOLOGY | Facility: CLINIC | Age: 64
End: 2024-01-01
Payer: COMMERCIAL

## 2024-01-01 ENCOUNTER — APPOINTMENT (OUTPATIENT)
Dept: ULTRASOUND IMAGING | Facility: CLINIC | Age: 64
End: 2024-01-01
Payer: COMMERCIAL

## 2024-01-01 ENCOUNTER — APPOINTMENT (OUTPATIENT)
Dept: NEUROLOGY | Facility: CLINIC | Age: 64
End: 2024-01-01
Payer: COMMERCIAL

## 2024-01-01 ENCOUNTER — APPOINTMENT (OUTPATIENT)
Dept: GENERAL RADIOLOGY | Facility: CLINIC | Age: 64
End: 2024-01-01
Payer: COMMERCIAL

## 2024-01-01 ENCOUNTER — VIRTUAL VISIT (OUTPATIENT)
Dept: ENDOCRINOLOGY | Facility: CLINIC | Age: 64
End: 2024-01-01
Payer: COMMERCIAL

## 2024-01-01 ENCOUNTER — HOSPITAL ENCOUNTER (OUTPATIENT)
Dept: CT IMAGING | Facility: CLINIC | Age: 64
Discharge: HOME | End: 2024-01-22
Attending: INTERNAL MEDICINE | Admitting: INTERNAL MEDICINE
Payer: COMMERCIAL

## 2024-01-01 ENCOUNTER — LAB REQUISITION (OUTPATIENT)
Dept: LAB | Facility: CLINIC | Age: 64
End: 2024-01-01

## 2024-01-01 ENCOUNTER — APPOINTMENT (OUTPATIENT)
Dept: RADIOLOGY | Facility: HOSPITAL | Age: 64
End: 2024-01-01
Attending: INTERNAL MEDICINE
Payer: COMMERCIAL

## 2024-01-01 ENCOUNTER — TRANSFERRED RECORDS (OUTPATIENT)
Dept: HEALTH INFORMATION MANAGEMENT | Facility: CLINIC | Age: 64
End: 2024-01-01
Payer: COMMERCIAL

## 2024-01-01 ENCOUNTER — ANESTHESIA (OUTPATIENT)
Dept: SURGERY | Facility: CLINIC | Age: 64
End: 2024-01-01
Payer: COMMERCIAL

## 2024-01-01 ENCOUNTER — TRANSCRIBE ORDERS (OUTPATIENT)
Dept: OTHER | Age: 64
End: 2024-01-01

## 2024-01-01 VITALS
SYSTOLIC BLOOD PRESSURE: 118 MMHG | BODY MASS INDEX: 14.34 KG/M2 | HEIGHT: 69 IN | RESPIRATION RATE: 17 BRPM | WEIGHT: 96.8 LBS | OXYGEN SATURATION: 99 % | DIASTOLIC BLOOD PRESSURE: 68 MMHG | HEART RATE: 108 BPM | TEMPERATURE: 98.6 F

## 2024-01-01 DIAGNOSIS — N18.4 CHRONIC KIDNEY DISEASE, STAGE IV (SEVERE) (H): ICD-10-CM

## 2024-01-01 DIAGNOSIS — R63.4 ABNORMAL WEIGHT LOSS: ICD-10-CM

## 2024-01-01 DIAGNOSIS — E83.52 HYPERCALCEMIA: ICD-10-CM

## 2024-01-01 DIAGNOSIS — I46.9 CARDIAC ARREST (H): ICD-10-CM

## 2024-01-01 DIAGNOSIS — N18.9 ANEMIA IN CHRONIC RENAL DISEASE: ICD-10-CM

## 2024-01-01 DIAGNOSIS — N18.4 CHRONIC KIDNEY DISEASE, STAGE 4 (SEVERE) (H): Primary | ICD-10-CM

## 2024-01-01 DIAGNOSIS — D63.1 ANEMIA IN CHRONIC RENAL DISEASE: ICD-10-CM

## 2024-01-01 DIAGNOSIS — D63.1 ANEMIA IN CHRONIC KIDNEY DISEASE: ICD-10-CM

## 2024-01-01 DIAGNOSIS — I10 HYPERTENSIVE DISORDER: ICD-10-CM

## 2024-01-01 DIAGNOSIS — N18.4 CHRONIC KIDNEY DISEASE, STAGE 4 (SEVERE) (H): ICD-10-CM

## 2024-01-01 DIAGNOSIS — Z01.818 ENCOUNTER FOR OTHER PREPROCEDURAL EXAMINATION: ICD-10-CM

## 2024-01-01 DIAGNOSIS — E87.5 HYPERKALEMIA: ICD-10-CM

## 2024-01-01 DIAGNOSIS — E05.90 HYPERTHYROIDISM: ICD-10-CM

## 2024-01-01 DIAGNOSIS — N13.1 HYDRONEPHROSIS WITH URETERAL STRICTURE: ICD-10-CM

## 2024-01-01 DIAGNOSIS — N18.9 ANEMIA IN CHRONIC KIDNEY DISEASE: ICD-10-CM

## 2024-01-01 LAB
ABO/RH(D): NORMAL
ABO/RH(D): NORMAL
ACT BLD: 178 SECONDS (ref 74–150)
ACT BLD: 193 SECONDS (ref 74–150)
ACT BLD: 197 SECONDS (ref 74–150)
ACT BLD: 209 SECONDS (ref 74–150)
ACT BLD: 209 SECONDS (ref 74–150)
ACT BLD: 267 SECONDS (ref 74–150)
ACT BLD: 356 SECONDS (ref 74–150)
ALBUMIN MFR UR ELPH: 176 MG/DL
ALBUMIN SERPL BCG-MCNC: 1.4 G/DL (ref 3.5–5.2)
ALBUMIN SERPL BCG-MCNC: 1.6 G/DL (ref 3.5–5.2)
ALBUMIN SERPL BCG-MCNC: 2 G/DL (ref 3.5–5.2)
ALBUMIN SERPL BCG-MCNC: 2.1 G/DL (ref 3.5–5.2)
ALBUMIN SERPL BCG-MCNC: 2.3 G/DL (ref 3.5–5.2)
ALBUMIN SERPL BCG-MCNC: 2.5 G/DL (ref 3.5–5.2)
ALBUMIN SERPL BCG-MCNC: 2.6 G/DL (ref 3.5–5.2)
ALBUMIN SERPL BCG-MCNC: 2.6 G/DL (ref 3.5–5.2)
ALBUMIN UR-MCNC: 300 MG/DL
ALBUMIN UR-MCNC: 70 MG/DL
ALLEN'S TEST: ABNORMAL
ALP SERPL-CCNC: 119 U/L (ref 40–150)
ALP SERPL-CCNC: 122 U/L (ref 40–150)
ALP SERPL-CCNC: 128 U/L (ref 40–150)
ALP SERPL-CCNC: 150 U/L (ref 40–150)
ALP SERPL-CCNC: 69 U/L (ref 40–150)
ALP SERPL-CCNC: 96 U/L (ref 40–150)
ALP SERPL-CCNC: 97 U/L (ref 40–150)
ALT SERPL W P-5'-P-CCNC: 125 U/L (ref 0–70)
ALT SERPL W P-5'-P-CCNC: 1278 U/L (ref 0–70)
ALT SERPL W P-5'-P-CCNC: 1331 U/L (ref 0–70)
ALT SERPL W P-5'-P-CCNC: 72 U/L (ref 0–70)
ALT SERPL W P-5'-P-CCNC: 89 U/L (ref 0–70)
ALT SERPL W P-5'-P-CCNC: 892 U/L (ref 0–70)
ALT SERPL W P-5'-P-CCNC: 98 U/L (ref 0–70)
AMPHETAMINES UR QL SCN: ABNORMAL
ANION GAP SERPL CALCULATED.3IONS-SCNC: 13 MMOL/L (ref 7–15)
ANION GAP SERPL CALCULATED.3IONS-SCNC: 14 MMOL/L (ref 7–15)
ANION GAP SERPL CALCULATED.3IONS-SCNC: 15 MMOL/L (ref 7–15)
ANION GAP SERPL CALCULATED.3IONS-SCNC: 18 MMOL/L (ref 7–15)
ANION GAP SERPL CALCULATED.3IONS-SCNC: 21 MMOL/L (ref 7–15)
ANION GAP SERPL CALCULATED.3IONS-SCNC: 21 MMOL/L (ref 7–15)
ANION GAP SERPL CALCULATED.3IONS-SCNC: 22 MMOL/L (ref 7–15)
ANION GAP SERPL CALCULATED.3IONS-SCNC: 24 MMOL/L (ref 7–15)
ANION GAP SERPL CALCULATED.3IONS-SCNC: 30 MMOL/L (ref 7–15)
ANTIBODY SCREEN: NEGATIVE
ANTIBODY SCREEN: NEGATIVE
APPEARANCE UR: ABNORMAL
APPEARANCE UR: ABNORMAL
APTT PPP: 36 SECONDS (ref 22–38)
APTT PPP: 48 SECONDS (ref 22–38)
APTT PPP: 59 SECONDS (ref 22–38)
APTT PPP: 80 SECONDS (ref 22–38)
APTT PPP: 97 SECONDS (ref 22–38)
AST SERPL W P-5'-P-CCNC: 191 U/L (ref 0–45)
AST SERPL W P-5'-P-CCNC: 2472 U/L (ref 0–45)
AST SERPL W P-5'-P-CCNC: 266 U/L (ref 0–45)
AST SERPL W P-5'-P-CCNC: 2984 U/L (ref 0–45)
AST SERPL W P-5'-P-CCNC: 299 U/L (ref 0–45)
AST SERPL W P-5'-P-CCNC: 3214 U/L (ref 0–45)
AST SERPL W P-5'-P-CCNC: 368 U/L (ref 0–45)
AT III ACT/NOR PPP CHRO: 86 % (ref 85–135)
BARBITURATES UR QL SCN: ABNORMAL
BASE EXCESS BLDA CALC-SCNC: -1.9 MMOL/L (ref -3–3)
BASE EXCESS BLDA CALC-SCNC: -10.4 MMOL/L (ref -3–3)
BASE EXCESS BLDA CALC-SCNC: -12.2 MMOL/L (ref -3–3)
BASE EXCESS BLDA CALC-SCNC: -16.8 MMOL/L (ref -3–3)
BASE EXCESS BLDA CALC-SCNC: -3.7 MMOL/L (ref -3–3)
BASE EXCESS BLDA CALC-SCNC: -5.9 MMOL/L (ref -3–3)
BASE EXCESS BLDA CALC-SCNC: -6 MMOL/L (ref -3–3)
BASE EXCESS BLDA CALC-SCNC: -6.3 MMOL/L (ref -3–3)
BASE EXCESS BLDA CALC-SCNC: -6.3 MMOL/L (ref -3–3)
BASE EXCESS BLDA CALC-SCNC: -6.5 MMOL/L (ref -3–3)
BASE EXCESS BLDA CALC-SCNC: -7.9 MMOL/L (ref -3–3)
BASE EXCESS BLDA CALC-SCNC: -8.1 MMOL/L (ref -3–3)
BASE EXCESS BLDA CALC-SCNC: -8.2 MMOL/L (ref -3–3)
BASE EXCESS BLDA CALC-SCNC: -8.3 MMOL/L (ref -3–3)
BASE EXCESS BLDA CALC-SCNC: -9.5 MMOL/L (ref -3–3)
BASE EXCESS BLDA CALC-SCNC: -9.7 MMOL/L (ref -3–3)
BASE EXCESS BLDA CALC-SCNC: -9.7 MMOL/L (ref -3–3)
BASE EXCESS BLDA CALC-SCNC: -9.8 MMOL/L (ref -3–3)
BASE EXCESS BLDA CALC-SCNC: -9.8 MMOL/L (ref -3–3)
BASE EXCESS BLDA CALC-SCNC: 0 MMOL/L (ref -3–3)
BASE EXCESS BLDA CALC-SCNC: 0.1 MMOL/L (ref -3–3)
BASE EXCESS BLDA CALC-SCNC: 14.4 MMOL/L (ref -3–3)
BASE EXCESS BLDA CALC-SCNC: 16 MMOL/L (ref -3–3)
BASE EXCESS BLDA CALC-SCNC: 16.2 MMOL/L (ref -3–3)
BASE EXCESS BLDA CALC-SCNC: 18.2 MMOL/L (ref -3–3)
BASE EXCESS BLDA CALC-SCNC: 2.2 MMOL/L (ref -3–3)
BASE EXCESS BLDA CALC-SCNC: 2.3 MMOL/L (ref -3–3)
BASE EXCESS BLDA CALC-SCNC: 27.7 MMOL/L (ref -3–3)
BASE EXCESS BLDA CALC-SCNC: 3.5 MMOL/L (ref -3–3)
BASE EXCESS BLDA CALC-SCNC: 4.9 MMOL/L (ref -3–3)
BASE EXCESS BLDA CALC-SCNC: 8.7 MMOL/L (ref -3–3)
BASE EXCESS BLDA CALC-SCNC: 9.3 MMOL/L (ref -3–3)
BASE EXCESS BLDA CALC-SCNC: 9.3 MMOL/L (ref -3–3)
BASE EXCESS BLDA CALC-SCNC: 9.7 MMOL/L (ref -3–3)
BASE EXCESS BLDV CALC-SCNC: -12.1 MMOL/L (ref -3–3)
BASE EXCESS BLDV CALC-SCNC: -7.3 MMOL/L (ref -3–3)
BASE EXCESS BLDV CALC-SCNC: -9.1 MMOL/L (ref -3–3)
BASE EXCESS BLDV CALC-SCNC: -9.3 MMOL/L (ref -3–3)
BASOPHILS # BLD AUTO: 0 10E3/UL (ref 0–0.2)
BASOPHILS NFR BLD AUTO: 0 %
BENZODIAZ UR QL SCN: ABNORMAL
BILIRUB SERPL-MCNC: 0.5 MG/DL
BILIRUB SERPL-MCNC: 0.8 MG/DL
BILIRUB SERPL-MCNC: 0.9 MG/DL
BILIRUB SERPL-MCNC: 1 MG/DL
BILIRUB SERPL-MCNC: 1.1 MG/DL
BILIRUB SERPL-MCNC: 1.2 MG/DL
BILIRUB SERPL-MCNC: 1.5 MG/DL
BILIRUB UR QL STRIP: NEGATIVE
BILIRUB UR QL STRIP: NEGATIVE
BLD PROD TYP BPU: NORMAL
BLOOD COMPONENT TYPE: NORMAL
BUN SERPL-MCNC: 44.7 MG/DL (ref 8–23)
BUN SERPL-MCNC: 44.9 MG/DL (ref 8–23)
BUN SERPL-MCNC: 47.7 MG/DL (ref 8–23)
BUN SERPL-MCNC: 52.1 MG/DL (ref 8–23)
BUN SERPL-MCNC: 54.9 MG/DL (ref 8–23)
BUN SERPL-MCNC: 58.6 MG/DL (ref 8–23)
BUN SERPL-MCNC: 61 MG/DL (ref 8–23)
BUN SERPL-MCNC: 61.4 MG/DL (ref 8–23)
BUN SERPL-MCNC: 62.7 MG/DL (ref 8–23)
BZE UR QL SCN: ABNORMAL
CA-I BLD-MCNC: 3.9 MG/DL (ref 4.4–5.2)
CA-I BLD-MCNC: 4.4 MG/DL (ref 4.4–5.2)
CA-I BLD-MCNC: 4.4 MG/DL (ref 4.4–5.2)
CA-I BLD-MCNC: 4.7 MG/DL (ref 4.4–5.2)
CA-I BLD-MCNC: 5 MG/DL (ref 4.4–5.2)
CA-I BLD-MCNC: 5 MG/DL (ref 4.4–5.2)
CA-I BLD-MCNC: 5.1 MG/DL (ref 4.4–5.2)
CA-I BLD-MCNC: 5.1 MG/DL (ref 4.4–5.2)
CA-I BLD-MCNC: 5.2 MG/DL (ref 4.4–5.2)
CA-I BLD-MCNC: 5.3 MG/DL (ref 4.4–5.2)
CA-I BLD-MCNC: 5.3 MG/DL (ref 4.4–5.2)
CA-I BLD-MCNC: 5.4 MG/DL (ref 4.4–5.2)
CA-I BLD-MCNC: 5.6 MG/DL (ref 4.4–5.2)
CA-I BLD-MCNC: 7.3 MG/DL (ref 4.4–5.2)
CALCIUM SERPL-MCNC: 11.7 MG/DL (ref 8.8–10.2)
CALCIUM SERPL-MCNC: 19.2 MG/DL (ref 8.8–10.2)
CALCIUM SERPL-MCNC: 9 MG/DL (ref 8.2–9.6)
CALCIUM SERPL-MCNC: 9 MG/DL (ref 8.2–9.6)
CALCIUM SERPL-MCNC: 9.3 MG/DL (ref 8.8–10.2)
CALCIUM SERPL-MCNC: 9.3 MG/DL (ref 8.8–10.2)
CALCIUM SERPL-MCNC: 9.7 MG/DL (ref 8.2–9.6)
CALCIUM SERPL-MCNC: 9.7 MG/DL (ref 8.2–9.6)
CALCIUM SERPL-MCNC: 9.9 MG/DL (ref 8.8–10.2)
CANNABINOIDS UR QL SCN: ABNORMAL
CF REDUC 30M P MA P HEP LENFR BLD TEG: 0 % (ref 0–8)
CF REDUC 60M P MA P HEPASE LENFR BLD TEG: 0.8 % (ref 0–15)
CFT P HPASE BLD TEG: 2 MINUTE (ref 1–3)
CHLORIDE SERPL-SCNC: 106 MMOL/L (ref 98–107)
CHLORIDE SERPL-SCNC: 108 MMOL/L (ref 98–107)
CHLORIDE SERPL-SCNC: 110 MMOL/L (ref 98–107)
CHLORIDE SERPL-SCNC: 112 MMOL/L (ref 98–107)
CHLORIDE SERPL-SCNC: 114 MMOL/L (ref 98–107)
CHOLEST SERPL-MCNC: 90 MG/DL
CI (COAGULATION INDEX)(Z): -1.3 (ref -3–3)
CK SERPL-CCNC: 1407 U/L (ref 39–308)
CK SERPL-CCNC: 4138 U/L (ref 39–308)
CK SERPL-CCNC: 4459 U/L (ref 39–308)
CK SERPL-CCNC: 5574 U/L (ref 39–308)
CK SERPL-CCNC: 6551 U/L (ref 39–308)
CK SERPL-CCNC: 7945 U/L (ref 39–308)
CK SERPL-CCNC: ABNORMAL U/L (ref 39–308)
CLOT ANGLE P HPASE BLD TEG: 63.8 DEGREES (ref 53–72)
CLOT INIT P HPASE BLD TEG: 9.2 MINUTE (ref 5–10)
CLOT STRENGTH P HPASE BLD TEG: 10.8 KD/SC (ref 4.5–11)
CODING SYSTEM: NORMAL
COHGB MFR BLD: 100 % (ref 95–96)
COHGB MFR BLD: 100 % (ref 95–96)
COHGB MFR BLD: 88.8 % (ref 96–97)
COHGB MFR BLD: 92.1 % (ref 96–97)
COHGB MFR BLD: 92.7 % (ref 95–96)
COHGB MFR BLD: 93.4 % (ref 96–97)
COHGB MFR BLD: 93.7 % (ref 95–96)
COHGB MFR BLD: 96.4 % (ref 96–97)
COHGB MFR BLD: 96.9 % (ref 95–96)
COHGB MFR BLD: 97.6 % (ref 95–96)
COHGB MFR BLD: 98.3 % (ref 95–96)
COHGB MFR BLD: 98.4 % (ref 96–97)
COHGB MFR BLD: 99 % (ref 96–97)
COHGB MFR BLD: 99.4 % (ref 95–96)
COHGB MFR BLD: 99.5 % (ref 95–96)
COHGB MFR BLD: 99.6 % (ref 96–97)
COHGB MFR BLD: 99.6 % (ref 96–97)
COHGB MFR BLD: 99.8 % (ref 95–96)
COHGB MFR BLD: 99.8 % (ref 96–97)
COHGB MFR BLD: 99.9 % (ref 96–97)
COHGB MFR BLD: 99.9 % (ref 96–97)
COHGB MFR BLD: >100 % (ref 95–96)
COHGB MFR BLD: >100 % (ref 95–96)
COHGB MFR BLD: >100 % (ref 96–97)
COHGB MFR BLD: >100 % (ref 96–97)
COLOR UR AUTO: ABNORMAL
COLOR UR AUTO: ABNORMAL
CORTIS SERPL-MCNC: 21.4 UG/DL
CPB POCT: NO
CREAT SERPL-MCNC: 2.6 MG/DL (ref 0.67–1.17)
CREAT SERPL-MCNC: 2.64 MG/DL (ref 0.67–1.17)
CREAT SERPL-MCNC: 2.79 MG/DL (ref 0.67–1.17)
CREAT SERPL-MCNC: 2.92 MG/DL (ref 0.67–1.17)
CREAT SERPL-MCNC: 3.18 MG/DL (ref 0.67–1.17)
CREAT SERPL-MCNC: 3.59 MG/DL (ref 0.67–1.17)
CREAT SERPL-MCNC: 3.7 MG/DL (ref 0.67–1.17)
CREAT SERPL-MCNC: 3.8 MG/DL (ref 0.67–1.17)
CREAT SERPL-MCNC: 3.9 MG/DL (ref 0.67–1.17)
CREAT UR-MCNC: 20.1 MG/DL
CROSSMATCH: NORMAL
CRP SERPL-MCNC: 72.7 MG/L
CRP SERPL-MCNC: 9.89 MG/L
D DIMER PPP FEU-MCNC: 13.66 UG/ML FEU (ref 0–0.5)
D DIMER PPP FEU-MCNC: >20 UG/ML FEU (ref 0–0.5)
DEPRECATED HCO3 PLAS-SCNC: 14 MMOL/L (ref 22–29)
DEPRECATED HCO3 PLAS-SCNC: 16 MMOL/L (ref 22–29)
DEPRECATED HCO3 PLAS-SCNC: 17 MMOL/L (ref 22–29)
DEPRECATED HCO3 PLAS-SCNC: 17 MMOL/L (ref 22–29)
DEPRECATED HCO3 PLAS-SCNC: 18 MMOL/L (ref 22–29)
DEPRECATED HCO3 PLAS-SCNC: 26 MMOL/L (ref 22–29)
DEPRECATED HCO3 PLAS-SCNC: 30 MMOL/L (ref 22–29)
DEPRECATED HCO3 PLAS-SCNC: 32 MMOL/L (ref 22–29)
DEPRECATED HCO3 PLAS-SCNC: 34 MMOL/L (ref 22–29)
EGFRCR SERPLBLD CKD-EPI 2021: 14 ML/MIN/1.73M2
EGFRCR SERPLBLD CKD-EPI 2021: 17 ML/MIN/1.73M2
EGFRCR SERPLBLD CKD-EPI 2021: 18 ML/MIN/1.73M2
EGFRCR SERPLBLD CKD-EPI 2021: 24 ML/MIN/1.73M2
EOSINOPHIL # BLD AUTO: 0 10E3/UL (ref 0–0.7)
EOSINOPHIL NFR BLD AUTO: 0 %
ERCP: NORMAL
ERYTHROCYTE [DISTWIDTH] IN BLOOD BY AUTOMATED COUNT: 14.5 % (ref 10–15)
ERYTHROCYTE [DISTWIDTH] IN BLOOD BY AUTOMATED COUNT: 14.6 % (ref 10–15)
ERYTHROCYTE [DISTWIDTH] IN BLOOD BY AUTOMATED COUNT: 14.8 % (ref 10–15)
ERYTHROCYTE [DISTWIDTH] IN BLOOD BY AUTOMATED COUNT: 14.9 % (ref 10–15)
ERYTHROCYTE [DISTWIDTH] IN BLOOD BY AUTOMATED COUNT: 15.2 % (ref 10–15)
ERYTHROCYTE [DISTWIDTH] IN BLOOD BY AUTOMATED COUNT: 15.3 % (ref 10–15)
ERYTHROCYTE [DISTWIDTH] IN BLOOD BY AUTOMATED COUNT: 16.1 % (ref 10–15)
ERYTHROCYTE [SEDIMENTATION RATE] IN BLOOD BY WESTERGREN METHOD: 21 MM/HR (ref 0–20)
ERYTHROCYTE [SEDIMENTATION RATE] IN BLOOD BY WESTERGREN METHOD: 28 MM/HR (ref 0–20)
FENTANYL UR QL: ABNORMAL
FIBRINOGEN PPP-MCNC: 309 MG/DL (ref 170–490)
FIBRINOGEN PPP-MCNC: 384 MG/DL (ref 170–490)
FIBRINOGEN PPP-MCNC: 485 MG/DL (ref 170–490)
GLUCOSE BLD-MCNC: 105 MG/DL (ref 70–99)
GLUCOSE BLD-MCNC: 112 MG/DL (ref 70–99)
GLUCOSE BLD-MCNC: 115 MG/DL (ref 70–99)
GLUCOSE BLD-MCNC: 149 MG/DL (ref 70–99)
GLUCOSE BLD-MCNC: 156 MG/DL (ref 70–99)
GLUCOSE BLD-MCNC: 205 MG/DL (ref 70–99)
GLUCOSE BLD-MCNC: 33 MG/DL (ref 70–99)
GLUCOSE BLD-MCNC: 39 MG/DL (ref 70–99)
GLUCOSE BLD-MCNC: 541 MG/DL (ref 70–99)
GLUCOSE BLD-MCNC: 67 MG/DL (ref 70–99)
GLUCOSE BLD-MCNC: 75 MG/DL (ref 70–99)
GLUCOSE BLD-MCNC: 78 MG/DL (ref 70–99)
GLUCOSE BLD-MCNC: 82 MG/DL (ref 70–99)
GLUCOSE BLDC GLUCOMTR-MCNC: 100 MG/DL (ref 70–99)
GLUCOSE BLDC GLUCOMTR-MCNC: 105 MG/DL (ref 70–99)
GLUCOSE BLDC GLUCOMTR-MCNC: 112 MG/DL (ref 70–99)
GLUCOSE BLDC GLUCOMTR-MCNC: 117 MG/DL (ref 70–99)
GLUCOSE BLDC GLUCOMTR-MCNC: 127 MG/DL (ref 70–99)
GLUCOSE BLDC GLUCOMTR-MCNC: 136 MG/DL (ref 70–99)
GLUCOSE BLDC GLUCOMTR-MCNC: 141 MG/DL (ref 70–99)
GLUCOSE BLDC GLUCOMTR-MCNC: 144 MG/DL (ref 70–99)
GLUCOSE BLDC GLUCOMTR-MCNC: 156 MG/DL (ref 70–99)
GLUCOSE BLDC GLUCOMTR-MCNC: 168 MG/DL (ref 70–99)
GLUCOSE BLDC GLUCOMTR-MCNC: 176 MG/DL (ref 70–99)
GLUCOSE BLDC GLUCOMTR-MCNC: 192 MG/DL (ref 70–99)
GLUCOSE BLDC GLUCOMTR-MCNC: 197 MG/DL (ref 70–99)
GLUCOSE BLDC GLUCOMTR-MCNC: 198 MG/DL (ref 70–99)
GLUCOSE BLDC GLUCOMTR-MCNC: 198 MG/DL (ref 70–99)
GLUCOSE BLDC GLUCOMTR-MCNC: 199 MG/DL (ref 70–99)
GLUCOSE BLDC GLUCOMTR-MCNC: 200 MG/DL (ref 70–99)
GLUCOSE BLDC GLUCOMTR-MCNC: 204 MG/DL (ref 70–99)
GLUCOSE BLDC GLUCOMTR-MCNC: 230 MG/DL (ref 70–99)
GLUCOSE BLDC GLUCOMTR-MCNC: 241 MG/DL (ref 70–99)
GLUCOSE BLDC GLUCOMTR-MCNC: 263 MG/DL (ref 70–99)
GLUCOSE BLDC GLUCOMTR-MCNC: 27 MG/DL (ref 70–99)
GLUCOSE BLDC GLUCOMTR-MCNC: 35 MG/DL (ref 70–99)
GLUCOSE BLDC GLUCOMTR-MCNC: 462 MG/DL (ref 70–99)
GLUCOSE BLDC GLUCOMTR-MCNC: 58 MG/DL (ref 70–99)
GLUCOSE BLDC GLUCOMTR-MCNC: 69 MG/DL (ref 70–99)
GLUCOSE BLDC GLUCOMTR-MCNC: 71 MG/DL (ref 70–99)
GLUCOSE BLDC GLUCOMTR-MCNC: 72 MG/DL (ref 70–99)
GLUCOSE BLDC GLUCOMTR-MCNC: 77 MG/DL (ref 70–99)
GLUCOSE BLDC GLUCOMTR-MCNC: 78 MG/DL (ref 70–99)
GLUCOSE BLDC GLUCOMTR-MCNC: 78 MG/DL (ref 70–99)
GLUCOSE BLDC GLUCOMTR-MCNC: 83 MG/DL (ref 70–99)
GLUCOSE BLDC GLUCOMTR-MCNC: 85 MG/DL (ref 70–99)
GLUCOSE BLDC GLUCOMTR-MCNC: 86 MG/DL (ref 70–99)
GLUCOSE BLDC GLUCOMTR-MCNC: 90 MG/DL (ref 70–99)
GLUCOSE BLDC GLUCOMTR-MCNC: 94 MG/DL (ref 70–99)
GLUCOSE BLDC GLUCOMTR-MCNC: 95 MG/DL (ref 70–99)
GLUCOSE BLDC GLUCOMTR-MCNC: 98 MG/DL (ref 70–99)
GLUCOSE SERPL-MCNC: 103 MG/DL (ref 70–99)
GLUCOSE SERPL-MCNC: 124 MG/DL (ref 70–99)
GLUCOSE SERPL-MCNC: 127 MG/DL (ref 70–99)
GLUCOSE SERPL-MCNC: 130 MG/DL (ref 70–99)
GLUCOSE SERPL-MCNC: 159 MG/DL (ref 70–99)
GLUCOSE SERPL-MCNC: 192 MG/DL (ref 70–99)
GLUCOSE SERPL-MCNC: 219 MG/DL (ref 70–99)
GLUCOSE SERPL-MCNC: 649 MG/DL (ref 70–99)
GLUCOSE SERPL-MCNC: 85 MG/DL (ref 70–99)
GLUCOSE UR STRIP-MCNC: 30 MG/DL
GLUCOSE UR STRIP-MCNC: NEGATIVE MG/DL
HBA1C MFR BLD: 5.3 %
HCO3 BLD-SCNC: 17 MMOL/L (ref 21–28)
HCO3 BLD-SCNC: 17 MMOL/L (ref 21–28)
HCO3 BLD-SCNC: 18 MMOL/L (ref 21–28)
HCO3 BLD-SCNC: 19 MMOL/L (ref 21–28)
HCO3 BLD-SCNC: 19 MMOL/L (ref 21–28)
HCO3 BLD-SCNC: 20 MMOL/L (ref 21–28)
HCO3 BLD-SCNC: 21 MMOL/L (ref 21–28)
HCO3 BLD-SCNC: 26 MMOL/L (ref 21–28)
HCO3 BLD-SCNC: 27 MMOL/L (ref 21–28)
HCO3 BLD-SCNC: 28 MMOL/L (ref 21–28)
HCO3 BLD-SCNC: 29 MMOL/L (ref 21–28)
HCO3 BLD-SCNC: 31 MMOL/L (ref 21–28)
HCO3 BLD-SCNC: 31 MMOL/L (ref 21–28)
HCO3 BLD-SCNC: 32 MMOL/L (ref 21–28)
HCO3 BLD-SCNC: 33 MMOL/L (ref 21–28)
HCO3 BLD-SCNC: 34 MMOL/L (ref 21–28)
HCO3 BLD-SCNC: 35 MMOL/L (ref 21–28)
HCO3 BLD-SCNC: 38 MMOL/L (ref 21–28)
HCO3 BLD-SCNC: 38 MMOL/L (ref 21–28)
HCO3 BLD-SCNC: 41 MMOL/L (ref 21–28)
HCO3 BLD-SCNC: 57 MMOL/L (ref 21–28)
HCO3 BLDA-SCNC: 10 MMOL/L (ref 21–28)
HCO3 BLDA-SCNC: 15 MMOL/L (ref 21–28)
HCO3 BLDA-SCNC: 18 MMOL/L (ref 21–28)
HCO3 BLDA-SCNC: 20 MMOL/L (ref 21–28)
HCO3 BLDA-SCNC: 20 MMOL/L (ref 21–28)
HCO3 BLDA-SCNC: 31 MMOL/L (ref 21–28)
HCO3 BLDA-SCNC: 33 MMOL/L (ref 21–28)
HCO3 BLDA-SCNC: 42 MMOL/L (ref 21–28)
HCO3 BLDA-SCNC: 46 MMOL/L (ref 21–28)
HCO3 BLDV-SCNC: 18 MMOL/L (ref 21–28)
HCO3 BLDV-SCNC: 19 MMOL/L (ref 21–28)
HCO3 BLDV-SCNC: 21 MMOL/L (ref 21–28)
HCO3 BLDV-SCNC: 22 MMOL/L (ref 21–28)
HCT VFR BLD AUTO: 21.1 % (ref 40–53)
HCT VFR BLD AUTO: 24.8 % (ref 40–53)
HCT VFR BLD AUTO: 25.5 % (ref 40–53)
HCT VFR BLD AUTO: 26.9 % (ref 40–53)
HCT VFR BLD AUTO: 27.3 % (ref 40–53)
HCT VFR BLD AUTO: 33 % (ref 40–53)
HCT VFR BLD AUTO: 33.2 % (ref 40–53)
HCT VFR BLD AUTO: 33.6 % (ref 40–53)
HCT VFR BLD AUTO: 33.7 % (ref 40–53)
HCT VFR BLD CALC: 20 % (ref 40–53)
HDLC SERPL-MCNC: 32 MG/DL
HGB BLD-MCNC: 10.8 G/DL (ref 13.3–17.7)
HGB BLD-MCNC: 11 G/DL (ref 13.3–17.7)
HGB BLD-MCNC: 11.1 G/DL (ref 13.3–17.7)
HGB BLD-MCNC: 11.3 G/DL (ref 13.3–17.7)
HGB BLD-MCNC: 5.8 G/DL (ref 13.3–17.7)
HGB BLD-MCNC: 6.5 G/DL (ref 13.3–17.7)
HGB BLD-MCNC: 6.8 G/DL (ref 13.3–17.7)
HGB BLD-MCNC: 7 G/DL (ref 13.3–17.7)
HGB BLD-MCNC: 7.8 G/DL (ref 13.3–17.7)
HGB BLD-MCNC: 8.9 G/DL (ref 13.3–17.7)
HGB BLD-MCNC: 9 G/DL (ref 13.3–17.7)
HGB BLD-MCNC: 9.1 G/DL (ref 13.3–17.7)
HGB BLD-MCNC: 9.2 G/DL (ref 13.3–17.7)
HGB BLD-MCNC: 9.6 G/DL (ref 13.3–17.7)
HGB BLD-MCNC: 9.9 G/DL (ref 13.3–17.7)
HGB FREE PLAS-MCNC: 30 MG/DL
HGB FREE PLAS-MCNC: 50 MG/DL
HGB UR QL STRIP: ABNORMAL
HGB UR QL STRIP: ABNORMAL
HOLD SPECIMEN: NORMAL
HOLD SPECIMEN: NORMAL
IMM GRANULOCYTES # BLD: 0.3 10E3/UL
IMM GRANULOCYTES NFR BLD: 1 %
INR PPP: 1.35 (ref 0.85–1.15)
INR PPP: 1.4 (ref 0.85–1.15)
INR PPP: 1.48 (ref 0.85–1.15)
INR PPP: 1.78 (ref 0.85–1.15)
INR PPP: 2.41 (ref 0.85–1.15)
ISSUE DATE AND TIME: NORMAL
KETONES UR STRIP-MCNC: NEGATIVE MG/DL
KETONES UR STRIP-MCNC: NEGATIVE MG/DL
LACTATE BLD-SCNC: 10.8 MMOL/L (ref 0.7–2)
LACTATE BLD-SCNC: 12.2 MMOL/L (ref 0.7–2)
LACTATE BLD-SCNC: 12.8 MMOL/L (ref 0.7–2)
LACTATE BLD-SCNC: 13.2 MMOL/L (ref 0.7–2)
LACTATE BLD-SCNC: 13.5 MMOL/L (ref 0.7–2)
LACTATE BLD-SCNC: 7.6 MMOL/L (ref 0.7–2)
LACTATE SERPL-SCNC: 10.9 MMOL/L (ref 0.7–2)
LACTATE SERPL-SCNC: 10.9 MMOL/L (ref 0.7–2)
LACTATE SERPL-SCNC: 11.4 MMOL/L (ref 0.7–2)
LACTATE SERPL-SCNC: 14.5 MMOL/L (ref 0.7–2)
LACTATE SERPL-SCNC: 16 MMOL/L (ref 0.7–2)
LACTATE SERPL-SCNC: 17 MMOL/L (ref 0.7–2)
LACTATE SERPL-SCNC: 18 MMOL/L (ref 0.7–2)
LACTATE SERPL-SCNC: 18 MMOL/L (ref 0.7–2)
LACTATE SERPL-SCNC: 19 MMOL/L (ref 0.7–2)
LACTATE SERPL-SCNC: 24 MMOL/L (ref 0.7–2)
LACTATE SERPL-SCNC: 4 MMOL/L (ref 0.7–2)
LACTATE SERPL-SCNC: 4.1 MMOL/L (ref 0.7–2)
LACTATE SERPL-SCNC: 4.5 MMOL/L (ref 0.7–2)
LACTATE SERPL-SCNC: 4.8 MMOL/L (ref 0.7–2)
LACTATE SERPL-SCNC: 5.2 MMOL/L (ref 0.7–2)
LACTATE SERPL-SCNC: 5.9 MMOL/L (ref 0.7–2)
LACTATE SERPL-SCNC: 6.7 MMOL/L (ref 0.7–2)
LACTATE SERPL-SCNC: 6.7 MMOL/L (ref 0.7–2)
LACTATE SERPL-SCNC: 6.8 MMOL/L (ref 0.7–2)
LACTATE SERPL-SCNC: 7.4 MMOL/L (ref 0.7–2)
LACTATE SERPL-SCNC: 7.6 MMOL/L (ref 0.7–2)
LACTATE SERPL-SCNC: 7.8 MMOL/L (ref 0.7–2)
LACTATE SERPL-SCNC: 9.8 MMOL/L (ref 0.7–2)
LDH SERPL L TO P-CCNC: 533 U/L (ref 0–250)
LDH SERPL L TO P-CCNC: 958 U/L (ref 0–250)
LDLC SERPL CALC-MCNC: 47 MG/DL
LEUKOCYTE ESTERASE UR QL STRIP: ABNORMAL
LEUKOCYTE ESTERASE UR QL STRIP: ABNORMAL
LVEF ECHO: NORMAL
LYMPHOCYTES # BLD AUTO: 1.4 10E3/UL (ref 0.8–5.3)
LYMPHOCYTES NFR BLD AUTO: 6 %
MAGNESIUM SERPL-MCNC: 1.3 MG/DL (ref 1.7–2.3)
MAGNESIUM SERPL-MCNC: 2.7 MG/DL (ref 1.7–2.3)
MAGNESIUM SERPL-MCNC: 3 MG/DL (ref 1.7–2.3)
MAGNESIUM SERPL-MCNC: 3.1 MG/DL (ref 1.7–2.3)
MAGNESIUM SERPL-MCNC: 3.1 MG/DL (ref 1.7–2.3)
MAGNESIUM SERPL-MCNC: 3.4 MG/DL (ref 1.7–2.3)
MAGNESIUM SERPL-MCNC: 3.6 MG/DL (ref 1.7–2.3)
MCF P HPASE BLD TEG: 68.3 MM (ref 50–70)
MCH RBC QN AUTO: 27 PG (ref 26.5–33)
MCH RBC QN AUTO: 27.4 PG (ref 26.5–33)
MCH RBC QN AUTO: 27.8 PG (ref 26.5–33)
MCH RBC QN AUTO: 28.1 PG (ref 26.5–33)
MCH RBC QN AUTO: 28.1 PG (ref 26.5–33)
MCH RBC QN AUTO: 28.2 PG (ref 26.5–33)
MCH RBC QN AUTO: 28.2 PG (ref 26.5–33)
MCH RBC QN AUTO: 28.3 PG (ref 26.5–33)
MCH RBC QN AUTO: 28.7 PG (ref 26.5–33)
MCHC RBC AUTO-ENTMCNC: 31.5 G/DL (ref 31.5–36.5)
MCHC RBC AUTO-ENTMCNC: 32.7 G/DL (ref 31.5–36.5)
MCHC RBC AUTO-ENTMCNC: 32.9 G/DL (ref 31.5–36.5)
MCHC RBC AUTO-ENTMCNC: 33.1 G/DL (ref 31.5–36.5)
MCHC RBC AUTO-ENTMCNC: 33.2 G/DL (ref 31.5–36.5)
MCHC RBC AUTO-ENTMCNC: 33.6 G/DL (ref 31.5–36.5)
MCHC RBC AUTO-ENTMCNC: 33.7 G/DL (ref 31.5–36.5)
MCHC RBC AUTO-ENTMCNC: 34.9 G/DL (ref 31.5–36.5)
MCHC RBC AUTO-ENTMCNC: 35.7 G/DL (ref 31.5–36.5)
MCV RBC AUTO: 80 FL (ref 78–100)
MCV RBC AUTO: 80 FL (ref 78–100)
MCV RBC AUTO: 83 FL (ref 78–100)
MCV RBC AUTO: 84 FL (ref 78–100)
MCV RBC AUTO: 84 FL (ref 78–100)
MCV RBC AUTO: 85 FL (ref 78–100)
MCV RBC AUTO: 85 FL (ref 78–100)
MCV RBC AUTO: 86 FL (ref 78–100)
MCV RBC AUTO: 86 FL (ref 78–100)
MONOCYTES # BLD AUTO: 0.8 10E3/UL (ref 0–1.3)
MONOCYTES NFR BLD AUTO: 4 %
MRSA DNA SPEC QL NAA+PROBE: NEGATIVE
NEUTROPHILS # BLD AUTO: 21.3 10E3/UL (ref 1.6–8.3)
NEUTROPHILS NFR BLD AUTO: 89 %
NITRATE UR QL: NEGATIVE
NITRATE UR QL: NEGATIVE
NONHDLC SERPL-MCNC: 58 MG/DL
NRBC # BLD AUTO: 0 10E3/UL
NRBC BLD AUTO-RTO: 0 /100
O2/TOTAL GAS SETTING VFR VENT: 100 %
O2/TOTAL GAS SETTING VFR VENT: 50 %
O2/TOTAL GAS SETTING VFR VENT: 60 %
O2/TOTAL GAS SETTING VFR VENT: 80 %
O2/TOTAL GAS SETTING VFR VENT: 80 %
OPIATES UR QL SCN: ABNORMAL
OXYHGB MFR BLDA: 93 % (ref 92–100)
OXYHGB MFR BLDA: 94 % (ref 92–100)
OXYHGB MFR BLDA: 96 % (ref 75–100)
OXYHGB MFR BLDA: 96 % (ref 92–100)
OXYHGB MFR BLDA: 97 % (ref 75–100)
OXYHGB MFR BLDA: 97 % (ref 92–100)
OXYHGB MFR BLDA: 97 % (ref 92–100)
OXYHGB MFR BLDA: 98 % (ref 75–100)
OXYHGB MFR BLDV: 66 %
OXYHGB MFR BLDV: 67 %
OXYHGB MFR BLDV: 77 %
OXYHGB MFR BLDV: 79 % (ref 70–75)
PCO2 BLD: 100 MM HG (ref 35–45)
PCO2 BLD: 107 MM HG (ref 35–45)
PCO2 BLD: 117 MM HG (ref 35–45)
PCO2 BLD: 125 MM HG (ref 35–45)
PCO2 BLD: 35 MM HG (ref 35–45)
PCO2 BLD: 35 MM HG (ref 35–45)
PCO2 BLD: 38 MM HG (ref 35–45)
PCO2 BLD: 38 MM HG (ref 35–45)
PCO2 BLD: 39 MM HG (ref 35–45)
PCO2 BLD: 39 MM HG (ref 35–45)
PCO2 BLD: 40 MM HG (ref 35–45)
PCO2 BLD: 41 MM HG (ref 35–45)
PCO2 BLD: 42 MM HG (ref 35–45)
PCO2 BLD: 43 MM HG (ref 35–45)
PCO2 BLD: 44 MM HG (ref 35–45)
PCO2 BLD: 46 MM HG (ref 35–45)
PCO2 BLD: 46 MM HG (ref 35–45)
PCO2 BLD: 47 MM HG (ref 35–45)
PCO2 BLD: 50 MM HG (ref 35–45)
PCO2 BLD: 50 MM HG (ref 35–45)
PCO2 BLD: 86 MM HG (ref 35–45)
PCO2 BLD: 88 MM HG (ref 35–45)
PCO2 BLD: 93 MM HG (ref 35–45)
PCO2 BLD: 96 MM HG (ref 35–45)
PCO2 BLD: 96 MM HG (ref 35–45)
PCO2 BLDA: 112 MM HG (ref 35–45)
PCO2 BLDA: 29 MM HG (ref 35–45)
PCO2 BLDA: 38 MM HG (ref 35–45)
PCO2 BLDA: 43 MM HG (ref 35–45)
PCO2 BLDA: 43 MM HG (ref 35–45)
PCO2 BLDA: 55 MM HG (ref 35–45)
PCO2 BLDA: 59 MM HG (ref 35–45)
PCO2 BLDA: 69 MM HG (ref 35–45)
PCO2 BLDA: 73 MM HG (ref 35–45)
PCO2 BLDV: 51 MM HG (ref 40–50)
PCO2 BLDV: 61 MM HG (ref 40–50)
PCO2 BLDV: 70 MM HG (ref 40–50)
PCO2 BLDV: 70 MM HG (ref 40–50)
PCP QUAL URINE (ROCHE): ABNORMAL
PEEP: 10 CM H2O
PEEP: 5 CM H2O
PEEP: 8 CM H2O
PH BLD: 6.94 [PH] (ref 7.35–7.45)
PH BLD: 7.04 [PH] (ref 7.35–7.45)
PH BLD: 7.04 [PH] (ref 7.35–7.45)
PH BLD: 7.07 [PH] (ref 7.35–7.45)
PH BLD: 7.11 [PH] (ref 7.35–7.45)
PH BLD: 7.16 [PH] (ref 7.35–7.45)
PH BLD: 7.18 [PH] (ref 7.35–7.45)
PH BLD: 7.18 [PH] (ref 7.35–7.45)
PH BLD: 7.2 [PH] (ref 7.35–7.45)
PH BLD: 7.21 [PH] (ref 7.35–7.45)
PH BLD: 7.21 [PH] (ref 7.35–7.45)
PH BLD: 7.22 [PH] (ref 7.35–7.45)
PH BLD: 7.25 [PH] (ref 7.35–7.45)
PH BLD: 7.26 [PH] (ref 7.35–7.45)
PH BLD: 7.28 [PH] (ref 7.35–7.45)
PH BLD: 7.3 [PH] (ref 7.35–7.45)
PH BLD: 7.3 [PH] (ref 7.35–7.45)
PH BLD: 7.31 [PH] (ref 7.35–7.45)
PH BLD: 7.34 [PH] (ref 7.35–7.45)
PH BLD: 7.36 [PH] (ref 7.35–7.45)
PH BLD: 7.45 [PH] (ref 7.35–7.45)
PH BLD: 7.5 [PH] (ref 7.35–7.45)
PH BLD: 7.54 [PH] (ref 7.35–7.45)
PH BLD: 7.55 [PH] (ref 7.35–7.45)
PH BLD: 7.64 [PH] (ref 7.35–7.45)
PH BLDA: 7.08 [PH] (ref 7.35–7.45)
PH BLDA: 7.16 [PH] (ref 7.35–7.45)
PH BLDA: 7.18 [PH] (ref 7.35–7.45)
PH BLDA: 7.2 [PH] (ref 7.35–7.45)
PH BLDA: 7.22 [PH] (ref 7.35–7.45)
PH BLDA: 7.22 [PH] (ref 7.35–7.45)
PH BLDA: 7.23 [PH] (ref 7.35–7.45)
PH BLDA: 7.46 [PH] (ref 7.35–7.45)
PH BLDA: 7.5 [PH] (ref 7.35–7.45)
PH BLDV: 7.02 [PH] (ref 7.32–7.43)
PH BLDV: 7.09 [PH] (ref 7.32–7.43)
PH BLDV: 7.16 [PH] (ref 7.32–7.43)
PH BLDV: 7.18 [PH] (ref 7.32–7.43)
PH UR STRIP: 6 [PH] (ref 5–7)
PH UR STRIP: 8.5 [PH] (ref 5–7)
PHOSPHATE SERPL-MCNC: 10.9 MG/DL (ref 2.5–4.5)
PHOSPHATE SERPL-MCNC: 17.4 MG/DL (ref 2.5–4.5)
PHOSPHATE SERPL-MCNC: 2.6 MG/DL (ref 2.5–4.5)
PHOSPHATE SERPL-MCNC: 4.3 MG/DL (ref 2.5–4.5)
PHOSPHATE SERPL-MCNC: 7.5 MG/DL (ref 2.5–4.5)
PLATELET # BLD AUTO: 134 10E3/UL (ref 150–450)
PLATELET # BLD AUTO: 163 10E3/UL (ref 150–450)
PLATELET # BLD AUTO: 163 10E3/UL (ref 150–450)
PLATELET # BLD AUTO: 164 10E3/UL (ref 150–450)
PLATELET # BLD AUTO: 242 10E3/UL (ref 150–450)
PLATELET # BLD AUTO: 253 10E3/UL (ref 150–450)
PLATELET # BLD AUTO: 265 10E3/UL (ref 150–450)
PLATELET # BLD AUTO: 266 10E3/UL (ref 150–450)
PLATELET # BLD AUTO: 98 10E3/UL (ref 150–450)
PO2 BLD: 100 MM HG (ref 80–105)
PO2 BLD: 103 MM HG (ref 80–105)
PO2 BLD: 105 MM HG (ref 80–105)
PO2 BLD: 120 MM HG (ref 80–105)
PO2 BLD: 120 MM HG (ref 80–105)
PO2 BLD: 127 MM HG (ref 80–105)
PO2 BLD: 140 MM HG (ref 80–105)
PO2 BLD: 142 MM HG (ref 80–105)
PO2 BLD: 150 MM HG (ref 80–105)
PO2 BLD: 157 MM HG (ref 80–105)
PO2 BLD: 158 MM HG (ref 80–105)
PO2 BLD: 170 MM HG (ref 80–105)
PO2 BLD: 195 MM HG (ref 80–105)
PO2 BLD: 213 MM HG (ref 80–105)
PO2 BLD: 242 MM HG (ref 80–105)
PO2 BLD: 276 MM HG (ref 80–105)
PO2 BLD: 297 MM HG (ref 80–105)
PO2 BLD: 49 MM HG (ref 80–105)
PO2 BLD: 68 MM HG (ref 80–105)
PO2 BLD: 74 MM HG (ref 80–105)
PO2 BLD: 75 MM HG (ref 80–105)
PO2 BLD: 82 MM HG (ref 80–105)
PO2 BLD: 82 MM HG (ref 80–105)
PO2 BLD: 96 MM HG (ref 80–105)
PO2 BLD: 97 MM HG (ref 80–105)
PO2 BLDA: 102 MM HG (ref 80–105)
PO2 BLDA: 109 MM HG (ref 80–105)
PO2 BLDA: 157 MM HG (ref 80–105)
PO2 BLDA: 162 MM HG (ref 80–105)
PO2 BLDA: 219 MM HG (ref 80–105)
PO2 BLDA: 53 MM HG (ref 80–105)
PO2 BLDA: 68 MM HG (ref 80–105)
PO2 BLDA: 90 MM HG (ref 80–105)
PO2 BLDA: 90 MM HG (ref 80–105)
PO2 BLDV: 39 MM HG (ref 25–47)
PO2 BLDV: 40 MM HG (ref 25–47)
PO2 BLDV: 52 MM HG (ref 25–47)
PO2 BLDV: 60 MM HG (ref 25–47)
POTASSIUM BLD-SCNC: 2.6 MMOL/L (ref 3.4–5.3)
POTASSIUM BLD-SCNC: 3.4 MMOL/L (ref 3.4–5.3)
POTASSIUM BLD-SCNC: 3.5 MMOL/L (ref 3.4–5.3)
POTASSIUM BLD-SCNC: 4.8 MMOL/L (ref 3.4–5.3)
POTASSIUM BLD-SCNC: 4.9 MMOL/L (ref 3.4–5.3)
POTASSIUM BLD-SCNC: 5.9 MMOL/L (ref 3.4–5.3)
POTASSIUM BLD-SCNC: 7.7 MMOL/L (ref 3.4–5.3)
POTASSIUM SERPL-SCNC: 3 MMOL/L (ref 3.4–5.3)
POTASSIUM SERPL-SCNC: 3.9 MMOL/L (ref 3.4–5.3)
POTASSIUM SERPL-SCNC: 3.9 MMOL/L (ref 3.4–5.3)
POTASSIUM SERPL-SCNC: 4.3 MMOL/L (ref 3.4–5.3)
POTASSIUM SERPL-SCNC: 4.5 MMOL/L (ref 3.4–5.3)
POTASSIUM SERPL-SCNC: 4.6 MMOL/L (ref 3.4–5.3)
POTASSIUM SERPL-SCNC: 4.9 MMOL/L (ref 3.4–5.3)
POTASSIUM SERPL-SCNC: 7.8 MMOL/L (ref 3.4–5.3)
POTASSIUM SERPL-SCNC: 8.3 MMOL/L (ref 3.4–5.3)
POTASSIUM SERPL-SCNC: 8.8 MMOL/L (ref 3.4–5.3)
PROCALCITONIN SERPL IA-MCNC: 0.63 NG/ML
PROT SERPL-MCNC: 2.7 G/DL (ref 6.4–8.3)
PROT SERPL-MCNC: 3.9 G/DL (ref 6.4–8.3)
PROT SERPL-MCNC: 4 G/DL (ref 6.4–8.3)
PROT SERPL-MCNC: 4.4 G/DL (ref 6.4–8.3)
PROT SERPL-MCNC: 4.7 G/DL (ref 6.4–8.3)
PROT SERPL-MCNC: 4.8 G/DL (ref 6.4–8.3)
PROT SERPL-MCNC: 5.1 G/DL (ref 6.4–8.3)
PROT/CREAT 24H UR: 8.76 MG/MG CR (ref 0–0.2)
RADIOLOGIST FLAGS: ABNORMAL
RADIOLOGIST FLAGS: ABNORMAL
RBC # BLD AUTO: 2.47 10E6/UL (ref 4.4–5.9)
RBC # BLD AUTO: 2.89 10E6/UL (ref 4.4–5.9)
RBC # BLD AUTO: 3.2 10E6/UL (ref 4.4–5.9)
RBC # BLD AUTO: 3.26 10E6/UL (ref 4.4–5.9)
RBC # BLD AUTO: 3.35 10E6/UL (ref 4.4–5.9)
RBC # BLD AUTO: 3.85 10E6/UL (ref 4.4–5.9)
RBC # BLD AUTO: 3.9 10E6/UL (ref 4.4–5.9)
RBC # BLD AUTO: 4.02 10E6/UL (ref 4.4–5.9)
RBC # BLD AUTO: 4.05 10E6/UL (ref 4.4–5.9)
RBC URINE: 2 /HPF
RBC URINE: 76 /HPF
SA TARGET DNA: NEGATIVE
SAO2 % BLDA: 76 % (ref 92–100)
SAO2 % BLDA: 87 % (ref 92–100)
SAO2 % BLDA: 91 % (ref 92–100)
SAO2 % BLDA: 91 % (ref 92–100)
SAO2 % BLDA: 92 % (ref 92–100)
SAO2 % BLDA: 92 % (ref 92–100)
SAO2 % BLDA: 94 % (ref 92–100)
SAO2 % BLDA: 94 % (ref 96–97)
SAO2 % BLDA: 95 % (ref 92–100)
SAO2 % BLDA: 95 % (ref 96–97)
SAO2 % BLDA: 96 % (ref 92–100)
SAO2 % BLDA: 96 % (ref 92–100)
SAO2 % BLDA: 97 % (ref 92–100)
SAO2 % BLDA: 97.9 % (ref 95–96)
SAO2 % BLDA: 98 % (ref 92–100)
SAO2 % BLDA: 98 % (ref 96–97)
SAO2 % BLDA: 98.2 % (ref 95–96)
SAO2 % BLDA: 99.6 % (ref 95–96)
SAO2 % BLDV: 67.7 % (ref 70–75)
SAO2 % BLDV: 68.1 % (ref 70–75)
SAO2 % BLDV: 78.8 % (ref 70–75)
SARS-COV-2 RNA RESP QL NAA+PROBE: NEGATIVE
SODIUM BLD-SCNC: 145 MMOL/L (ref 135–145)
SODIUM BLD-SCNC: 146 MMOL/L (ref 135–145)
SODIUM BLD-SCNC: 146 MMOL/L (ref 135–145)
SODIUM BLD-SCNC: 163 MMOL/L (ref 135–145)
SODIUM BLD-SCNC: 165 MMOL/L (ref 135–145)
SODIUM BLD-SCNC: 168 MMOL/L (ref 135–145)
SODIUM BLD-SCNC: 171 MMOL/L (ref 135–145)
SODIUM SERPL-SCNC: 136 MMOL/L (ref 135–145)
SODIUM SERPL-SCNC: 144 MMOL/L (ref 135–145)
SODIUM SERPL-SCNC: 144 MMOL/L (ref 135–145)
SODIUM SERPL-SCNC: 146 MMOL/L (ref 135–145)
SODIUM SERPL-SCNC: 148 MMOL/L (ref 135–145)
SODIUM SERPL-SCNC: 161 MMOL/L (ref 135–145)
SODIUM SERPL-SCNC: 167 MMOL/L (ref 135–145)
SODIUM SERPL-SCNC: 168 MMOL/L (ref 135–145)
SODIUM SERPL-SCNC: 168 MMOL/L (ref 135–145)
SODIUM SERPL-SCNC: 170 MMOL/L (ref 135–145)
SP GR UR STRIP: 1 (ref 1–1.03)
SP GR UR STRIP: 1.01 (ref 1–1.03)
SPECIMEN EXPIRATION DATE: NORMAL
SPECIMEN EXPIRATION DATE: NORMAL
SQUAMOUS EPITHELIAL: <1 /HPF
T4 FREE SERPL-MCNC: 4.36 NG/DL (ref 0.9–1.7)
TRANSITIONAL EPI: <1 /HPF
TRIGL SERPL-MCNC: 57 MG/DL
TROPONIN T SERPL HS-MCNC: 3218 NG/L
TROPONIN T SERPL HS-MCNC: ABNORMAL NG/L
TSH SERPL DL<=0.005 MIU/L-ACNC: 0.08 UIU/ML (ref 0.3–4.2)
UFH PPP CHRO-ACNC: 0.12 IU/ML
UFH PPP CHRO-ACNC: 0.19 IU/ML
UFH PPP CHRO-ACNC: 0.32 IU/ML
UFH PPP CHRO-ACNC: 0.4 IU/ML
UFH PPP CHRO-ACNC: <0.1 IU/ML
UFH PPP CHRO-ACNC: <0.1 IU/ML
UNIT ABO/RH: NORMAL
UNIT NUMBER: NORMAL
UNIT STATUS: NORMAL
UNIT TYPE ISBT: 6200
UNIT TYPE ISBT: 9500
UPPER EUS: NORMAL
UROBILINOGEN UR STRIP-MCNC: NORMAL MG/DL
UROBILINOGEN UR STRIP-MCNC: NORMAL MG/DL
WBC # BLD AUTO: 23.9 10E3/UL (ref 4–11)
WBC # BLD AUTO: 23.9 10E3/UL (ref 4–11)
WBC # BLD AUTO: 28.1 10E3/UL (ref 4–11)
WBC # BLD AUTO: 28.9 10E3/UL (ref 4–11)
WBC # BLD AUTO: 30.4 10E3/UL (ref 4–11)
WBC # BLD AUTO: 31.2 10E3/UL (ref 4–11)
WBC # BLD AUTO: 31.6 10E3/UL (ref 4–11)
WBC # BLD AUTO: 31.9 10E3/UL (ref 4–11)
WBC # BLD AUTO: 32.2 10E3/UL (ref 4–11)
WBC CLUMPS #/AREA URNS HPF: PRESENT /HPF
WBC CLUMPS #/AREA URNS HPF: PRESENT /HPF
WBC URINE: >182 /HPF
WBC URINE: >182 /HPF

## 2024-01-01 PROCEDURE — 272N000237 HC CARDIOHELP CIRCUIT

## 2024-01-01 PROCEDURE — 83735 ASSAY OF MAGNESIUM: CPT

## 2024-01-01 PROCEDURE — 93010 ELECTROCARDIOGRAM REPORT: CPT | Mod: XU | Performed by: INTERNAL MEDICINE

## 2024-01-01 PROCEDURE — P9016 RBC LEUKOCYTES REDUCED: HCPCS

## 2024-01-01 PROCEDURE — 99238 HOSP IP/OBS DSCHRG MGMT 30/<: CPT | Mod: 25

## 2024-01-01 PROCEDURE — 70450 CT HEAD/BRAIN W/O DYE: CPT

## 2024-01-01 PROCEDURE — 99255 IP/OBS CONSLTJ NEW/EST HI 80: CPT | Performed by: SURGERY

## 2024-01-01 PROCEDURE — 93325 DOPPLER ECHO COLOR FLOW MAPG: CPT | Mod: 26 | Performed by: INTERNAL MEDICINE

## 2024-01-01 PROCEDURE — 272N000001 HC OR GENERAL SUPPLY STERILE: Performed by: INTERNAL MEDICINE

## 2024-01-01 PROCEDURE — 82330 ASSAY OF CALCIUM: CPT

## 2024-01-01 PROCEDURE — 93306 TTE W/DOPPLER COMPLETE: CPT | Mod: 26 | Performed by: INTERNAL MEDICINE

## 2024-01-01 PROCEDURE — 250N000009 HC RX 250: Performed by: INTERNAL MEDICINE

## 2024-01-01 PROCEDURE — 80048 BASIC METABOLIC PNL TOTAL CA: CPT | Performed by: FAMILY MEDICINE

## 2024-01-01 PROCEDURE — 250N000009 HC RX 250

## 2024-01-01 PROCEDURE — 33984 ECMO/ECLS RMVL PRPH CANNULA: CPT | Performed by: NURSE ANESTHETIST, CERTIFIED REGISTERED

## 2024-01-01 PROCEDURE — B2111ZZ FLUOROSCOPY OF MULTIPLE CORONARY ARTERIES USING LOW OSMOLAR CONTRAST: ICD-10-PCS | Performed by: INTERNAL MEDICINE

## 2024-01-01 PROCEDURE — C9113 INJ PANTOPRAZOLE SODIUM, VIA: HCPCS

## 2024-01-01 PROCEDURE — 250N000011 HC RX IP 250 OP 636: Performed by: NURSE ANESTHETIST, CERTIFIED REGISTERED

## 2024-01-01 PROCEDURE — 33949 ECMO/ECLS DAILY MGMT ARTERY: CPT

## 2024-01-01 PROCEDURE — 81001 URINALYSIS AUTO W/SCOPE: CPT

## 2024-01-01 PROCEDURE — 255N000002 HC RX 255 OP 636: Performed by: INTERNAL MEDICINE

## 2024-01-01 PROCEDURE — G2211 COMPLEX E/M VISIT ADD ON: HCPCS | Mod: 95 | Performed by: STUDENT IN AN ORGANIZED HEALTH CARE EDUCATION/TRAINING PROGRAM

## 2024-01-01 PROCEDURE — 04QK0ZZ REPAIR RIGHT FEMORAL ARTERY, OPEN APPROACH: ICD-10-PCS | Performed by: SURGERY

## 2024-01-01 PROCEDURE — 258N000001 HC RX 258

## 2024-01-01 PROCEDURE — 74176 CT ABD & PELVIS W/O CONTRAST: CPT | Mod: 26 | Performed by: RADIOLOGY

## 2024-01-01 PROCEDURE — 71045 X-RAY EXAM CHEST 1 VIEW: CPT | Mod: 26 | Performed by: STUDENT IN AN ORGANIZED HEALTH CARE EDUCATION/TRAINING PROGRAM

## 2024-01-01 PROCEDURE — 85520 HEPARIN ASSAY: CPT | Performed by: STUDENT IN AN ORGANIZED HEALTH CARE EDUCATION/TRAINING PROGRAM

## 2024-01-01 PROCEDURE — 250N000009 HC RX 250: Performed by: STUDENT IN AN ORGANIZED HEALTH CARE EDUCATION/TRAINING PROGRAM

## 2024-01-01 PROCEDURE — 99223 1ST HOSP IP/OBS HIGH 75: CPT | Mod: GC | Performed by: INTERNAL MEDICINE

## 2024-01-01 PROCEDURE — 82805 BLOOD GASES W/O2 SATURATION: CPT

## 2024-01-01 PROCEDURE — 84450 TRANSFERASE (AST) (SGOT): CPT

## 2024-01-01 PROCEDURE — 250N000011 HC RX IP 250 OP 636: Mod: JZ | Performed by: STUDENT IN AN ORGANIZED HEALTH CARE EDUCATION/TRAINING PROGRAM

## 2024-01-01 PROCEDURE — 258N000003 HC RX IP 258 OP 636: Performed by: ANESTHESIOLOGY

## 2024-01-01 PROCEDURE — 82550 ASSAY OF CK (CPK): CPT

## 2024-01-01 PROCEDURE — 92928 PRQ TCAT PLMT NTRAC ST 1 LES: CPT | Mod: RC | Performed by: INTERNAL MEDICINE

## 2024-01-01 PROCEDURE — 82330 ASSAY OF CALCIUM: CPT | Performed by: PHYSICIAN ASSISTANT

## 2024-01-01 PROCEDURE — A7035 POS AIRWAY PRESS HEADGEAR: HCPCS

## 2024-01-01 PROCEDURE — 250N000009 HC RX 250: Performed by: ANESTHESIOLOGY

## 2024-01-01 PROCEDURE — 250N000011 HC RX IP 250 OP 636: Performed by: ANESTHESIOLOGY

## 2024-01-01 PROCEDURE — 99205 OFFICE O/P NEW HI 60 MIN: CPT | Mod: 95 | Performed by: STUDENT IN AN ORGANIZED HEALTH CARE EDUCATION/TRAINING PROGRAM

## 2024-01-01 PROCEDURE — 85610 PROTHROMBIN TIME: CPT

## 2024-01-01 PROCEDURE — 250N000009 HC RX 250: Performed by: NURSE ANESTHETIST, CERTIFIED REGISTERED

## 2024-01-01 PROCEDURE — 83605 ASSAY OF LACTIC ACID: CPT

## 2024-01-01 PROCEDURE — 200N000002 HC R&B ICU UMMC

## 2024-01-01 PROCEDURE — C1769 GUIDE WIRE: HCPCS | Performed by: INTERNAL MEDICINE

## 2024-01-01 PROCEDURE — 95720 EEG PHY/QHP EA INCR W/VEEG: CPT | Performed by: PSYCHIATRY & NEUROLOGY

## 2024-01-01 PROCEDURE — 84450 TRANSFERASE (AST) (SGOT): CPT | Performed by: STUDENT IN AN ORGANIZED HEALTH CARE EDUCATION/TRAINING PROGRAM

## 2024-01-01 PROCEDURE — 999N000157 HC STATISTIC RCP TIME EA 10 MIN

## 2024-01-01 PROCEDURE — 250N000012 HC RX MED GY IP 250 OP 636 PS 637: Performed by: STUDENT IN AN ORGANIZED HEALTH CARE EDUCATION/TRAINING PROGRAM

## 2024-01-01 PROCEDURE — 258N000003 HC RX IP 258 OP 636: Performed by: INTERNAL MEDICINE

## 2024-01-01 PROCEDURE — 999N000141 HC STATISTIC PRE-PROCEDURE NURSING ASSESSMENT: Performed by: INTERNAL MEDICINE

## 2024-01-01 PROCEDURE — 250N000011 HC RX IP 250 OP 636: Performed by: INTERNAL MEDICINE

## 2024-01-01 PROCEDURE — 33984 ECMO/ECLS RMVL PRPH CANNULA: CPT | Performed by: SURGERY

## 2024-01-01 PROCEDURE — 85027 COMPLETE CBC AUTOMATED: CPT | Performed by: STUDENT IN AN ORGANIZED HEALTH CARE EDUCATION/TRAINING PROGRAM

## 2024-01-01 PROCEDURE — 93925 LOWER EXTREMITY STUDY: CPT | Mod: 26 | Performed by: STUDENT IN AN ORGANIZED HEALTH CARE EDUCATION/TRAINING PROGRAM

## 2024-01-01 PROCEDURE — 87149 DNA/RNA DIRECT PROBE: CPT | Performed by: PHYSICIAN ASSISTANT

## 2024-01-01 PROCEDURE — 250N000013 HC RX MED GY IP 250 OP 250 PS 637

## 2024-01-01 PROCEDURE — 258N000003 HC RX IP 258 OP 636: Performed by: STUDENT IN AN ORGANIZED HEALTH CARE EDUCATION/TRAINING PROGRAM

## 2024-01-01 PROCEDURE — 94003 VENT MGMT INPAT SUBQ DAY: CPT

## 2024-01-01 PROCEDURE — 82947 ASSAY GLUCOSE BLOOD QUANT: CPT

## 2024-01-01 PROCEDURE — 95714 VEEG EA 12-26 HR UNMNTR: CPT

## 2024-01-01 PROCEDURE — 999N000065 XR CHEST PORT 1 VIEW

## 2024-01-01 PROCEDURE — 82803 BLOOD GASES ANY COMBINATION: CPT

## 2024-01-01 PROCEDURE — 93005 ELECTROCARDIOGRAM TRACING: CPT

## 2024-01-01 PROCEDURE — 93308 TTE F-UP OR LMTD: CPT | Mod: 26 | Performed by: INTERNAL MEDICINE

## 2024-01-01 PROCEDURE — 93321 DOPPLER ECHO F-UP/LMTD STD: CPT | Mod: 26 | Performed by: INTERNAL MEDICINE

## 2024-01-01 PROCEDURE — P9059 PLASMA, FRZ BETWEEN 8-24HOUR: HCPCS

## 2024-01-01 PROCEDURE — 87186 SC STD MICRODIL/AGAR DIL: CPT | Performed by: PHYSICIAN ASSISTANT

## 2024-01-01 PROCEDURE — 999N000180 XR SURGERY CARM FLUORO LESS THAN 5 MIN

## 2024-01-01 PROCEDURE — 85520 HEPARIN ASSAY: CPT

## 2024-01-01 PROCEDURE — 250N000011 HC RX IP 250 OP 636

## 2024-01-01 PROCEDURE — 250N000011 HC RX IP 250 OP 636: Performed by: STUDENT IN AN ORGANIZED HEALTH CARE EDUCATION/TRAINING PROGRAM

## 2024-01-01 PROCEDURE — 85027 COMPLETE CBC AUTOMATED: CPT

## 2024-01-01 PROCEDURE — 360N000079 HC SURGERY LEVEL 6, PER MIN: Performed by: SURGERY

## 2024-01-01 PROCEDURE — 36415 COLL VENOUS BLD VENIPUNCTURE: CPT | Performed by: STUDENT IN AN ORGANIZED HEALTH CARE EDUCATION/TRAINING PROGRAM

## 2024-01-01 PROCEDURE — 71045 X-RAY EXAM CHEST 1 VIEW: CPT

## 2024-01-01 PROCEDURE — C1726 CATH, BAL DIL, NON-VASCULAR: HCPCS | Performed by: INTERNAL MEDICINE

## 2024-01-01 PROCEDURE — C1874 STENT, COATED/COV W/DEL SYS: HCPCS | Performed by: INTERNAL MEDICINE

## 2024-01-01 PROCEDURE — 250N000013 HC RX MED GY IP 250 OP 250 PS 637: Performed by: STUDENT IN AN ORGANIZED HEALTH CARE EDUCATION/TRAINING PROGRAM

## 2024-01-01 PROCEDURE — 86900 BLOOD TYPING SEROLOGIC ABO: CPT | Performed by: STUDENT IN AN ORGANIZED HEALTH CARE EDUCATION/TRAINING PROGRAM

## 2024-01-01 PROCEDURE — 85384 FIBRINOGEN ACTIVITY: CPT

## 2024-01-01 PROCEDURE — 36415 COLL VENOUS BLD VENIPUNCTURE: CPT

## 2024-01-01 PROCEDURE — 90947 DIALYSIS REPEATED EVAL: CPT

## 2024-01-01 PROCEDURE — 258N000001 HC RX 258: Performed by: STUDENT IN AN ORGANIZED HEALTH CARE EDUCATION/TRAINING PROGRAM

## 2024-01-01 PROCEDURE — 99291 CRITICAL CARE FIRST HOUR: CPT | Mod: FS | Performed by: NURSE PRACTITIONER

## 2024-01-01 PROCEDURE — 95718 EEG PHYS/QHP 2-12 HR W/VEEG: CPT | Performed by: PSYCHIATRY & NEUROLOGY

## 2024-01-01 PROCEDURE — 71045 X-RAY EXAM CHEST 1 VIEW: CPT | Mod: 26 | Performed by: RADIOLOGY

## 2024-01-01 PROCEDURE — 82805 BLOOD GASES W/O2 SATURATION: CPT | Performed by: STUDENT IN AN ORGANIZED HEALTH CARE EDUCATION/TRAINING PROGRAM

## 2024-01-01 PROCEDURE — 5A1945Z RESPIRATORY VENTILATION, 24-96 CONSECUTIVE HOURS: ICD-10-PCS | Performed by: STUDENT IN AN ORGANIZED HEALTH CARE EDUCATION/TRAINING PROGRAM

## 2024-01-01 PROCEDURE — 87040 BLOOD CULTURE FOR BACTERIA: CPT | Performed by: STUDENT IN AN ORGANIZED HEALTH CARE EDUCATION/TRAINING PROGRAM

## 2024-01-01 PROCEDURE — 5A1522G EXTRACORPOREAL OXYGENATION, MEMBRANE, PERIPHERAL VENO-ARTERIAL: ICD-10-PCS | Performed by: INTERNAL MEDICINE

## 2024-01-01 PROCEDURE — 250N000013 HC RX MED GY IP 250 OP 250 PS 637: Performed by: PHYSICIAN ASSISTANT

## 2024-01-01 PROCEDURE — P9016 RBC LEUKOCYTES REDUCED: HCPCS | Performed by: STUDENT IN AN ORGANIZED HEALTH CARE EDUCATION/TRAINING PROGRAM

## 2024-01-01 PROCEDURE — 71250 CT THORAX DX C-: CPT | Mod: 26 | Performed by: RADIOLOGY

## 2024-01-01 PROCEDURE — 360N000083 HC SURGERY LEVEL 3 W/ FLUORO, PER MIN: Performed by: INTERNAL MEDICINE

## 2024-01-01 PROCEDURE — 85379 FIBRIN DEGRADATION QUANT: CPT

## 2024-01-01 PROCEDURE — C1894 INTRO/SHEATH, NON-LASER: HCPCS | Performed by: INTERNAL MEDICINE

## 2024-01-01 PROCEDURE — 84100 ASSAY OF PHOSPHORUS: CPT | Performed by: PHYSICIAN ASSISTANT

## 2024-01-01 PROCEDURE — 33947 ECMO/ECLS INITIATION ARTERY: CPT | Performed by: INTERNAL MEDICINE

## 2024-01-01 PROCEDURE — 84484 ASSAY OF TROPONIN QUANT: CPT

## 2024-01-01 PROCEDURE — C1887 CATHETER, GUIDING: HCPCS | Performed by: INTERNAL MEDICINE

## 2024-01-01 PROCEDURE — 99291 CRITICAL CARE FIRST HOUR: CPT | Mod: 25

## 2024-01-01 PROCEDURE — 83735 ASSAY OF MAGNESIUM: CPT | Performed by: PHYSICIAN ASSISTANT

## 2024-01-01 PROCEDURE — 99292 CRITICAL CARE ADDL 30 MIN: CPT | Mod: 25 | Performed by: STUDENT IN AN ORGANIZED HEALTH CARE EDUCATION/TRAINING PROGRAM

## 2024-01-01 PROCEDURE — 33984 ECMO/ECLS RMVL PRPH CANNULA: CPT | Performed by: ANESTHESIOLOGY

## 2024-01-01 PROCEDURE — 33952 ECMO/ECLS INSJ PRPH CANNULA: CPT | Performed by: INTERNAL MEDICINE

## 2024-01-01 PROCEDURE — 258N000003 HC RX IP 258 OP 636

## 2024-01-01 PROCEDURE — 93925 LOWER EXTREMITY STUDY: CPT

## 2024-01-01 PROCEDURE — 71250 CT THORAX DX C-: CPT

## 2024-01-01 PROCEDURE — 93880 EXTRACRANIAL BILAT STUDY: CPT | Mod: 26 | Performed by: RADIOLOGY

## 2024-01-01 PROCEDURE — 027034Z DILATION OF CORONARY ARTERY, ONE ARTERY WITH DRUG-ELUTING INTRALUMINAL DEVICE, PERCUTANEOUS APPROACH: ICD-10-PCS | Performed by: INTERNAL MEDICINE

## 2024-01-01 PROCEDURE — 272N000001 HC OR GENERAL SUPPLY STERILE: Performed by: SURGERY

## 2024-01-01 PROCEDURE — 5A1D90Z PERFORMANCE OF URINARY FILTRATION, CONTINUOUS, GREATER THAN 18 HOURS PER DAY: ICD-10-PCS | Performed by: INTERNAL MEDICINE

## 2024-01-01 PROCEDURE — 710N000012 HC RECOVERY PHASE 2, PER MINUTE: Performed by: INTERNAL MEDICINE

## 2024-01-01 PROCEDURE — 99291 CRITICAL CARE FIRST HOUR: CPT | Mod: 25 | Performed by: STUDENT IN AN ORGANIZED HEALTH CARE EDUCATION/TRAINING PROGRAM

## 2024-01-01 PROCEDURE — 33947 ECMO/ECLS INITIATION ARTERY: CPT

## 2024-01-01 PROCEDURE — C9600 PERC DRUG-EL COR STENT SING: HCPCS | Performed by: INTERNAL MEDICINE

## 2024-01-01 PROCEDURE — 86316 IMMUNOASSAY TUMOR OTHER: CPT

## 2024-01-01 PROCEDURE — 85014 HEMATOCRIT: CPT

## 2024-01-01 PROCEDURE — 82040 ASSAY OF SERUM ALBUMIN: CPT

## 2024-01-01 PROCEDURE — 94002 VENT MGMT INPAT INIT DAY: CPT

## 2024-01-01 PROCEDURE — 84132 ASSAY OF SERUM POTASSIUM: CPT | Performed by: STUDENT IN AN ORGANIZED HEALTH CARE EDUCATION/TRAINING PROGRAM

## 2024-01-01 PROCEDURE — 410N000003 HC PER-PERFUSION 1ST 30 MIN: Performed by: INTERNAL MEDICINE

## 2024-01-01 PROCEDURE — C1751 CATH, INF, PER/CENT/MIDLINE: HCPCS | Performed by: INTERNAL MEDICINE

## 2024-01-01 PROCEDURE — 93880 EXTRACRANIAL BILAT STUDY: CPT

## 2024-01-01 PROCEDURE — 84155 ASSAY OF PROTEIN SERUM: CPT

## 2024-01-01 PROCEDURE — 36620 INSERTION CATHETER ARTERY: CPT | Mod: XU | Performed by: INTERNAL MEDICINE

## 2024-01-01 PROCEDURE — 93325 DOPPLER ECHO COLOR FLOW MAPG: CPT

## 2024-01-01 PROCEDURE — 3E043XZ INTRODUCTION OF VASOPRESSOR INTO CENTRAL VEIN, PERCUTANEOUS APPROACH: ICD-10-PCS | Performed by: STUDENT IN AN ORGANIZED HEALTH CARE EDUCATION/TRAINING PROGRAM

## 2024-01-01 PROCEDURE — C1725 CATH, TRANSLUMIN NON-LASER: HCPCS | Performed by: INTERNAL MEDICINE

## 2024-01-01 PROCEDURE — 86923 COMPATIBILITY TEST ELECTRIC: CPT | Performed by: STUDENT IN AN ORGANIZED HEALTH CARE EDUCATION/TRAINING PROGRAM

## 2024-01-01 PROCEDURE — 250N000024 HC ISOFLURANE, PER MIN: Performed by: SURGERY

## 2024-01-01 PROCEDURE — 82805 BLOOD GASES W/O2 SATURATION: CPT | Performed by: PHYSICIAN ASSISTANT

## 2024-01-01 PROCEDURE — 84156 ASSAY OF PROTEIN URINE: CPT

## 2024-01-01 PROCEDURE — 33949 ECMO/ECLS DAILY MGMT ARTERY: CPT | Performed by: STUDENT IN AN ORGANIZED HEALTH CARE EDUCATION/TRAINING PROGRAM

## 2024-01-01 PROCEDURE — 250N000011 HC RX IP 250 OP 636: Mod: JZ

## 2024-01-01 PROCEDURE — 36556 INSERT NON-TUNNEL CV CATH: CPT | Mod: XU | Performed by: INTERNAL MEDICINE

## 2024-01-01 PROCEDURE — 94640 AIRWAY INHALATION TREATMENT: CPT | Mod: 76

## 2024-01-01 PROCEDURE — 70450 CT HEAD/BRAIN W/O DYE: CPT | Mod: 26 | Performed by: RADIOLOGY

## 2024-01-01 PROCEDURE — 93306 TTE W/DOPPLER COMPLETE: CPT

## 2024-01-01 PROCEDURE — 84100 ASSAY OF PHOSPHORUS: CPT

## 2024-01-01 PROCEDURE — 95711 VEEG 2-12 HR UNMONITORED: CPT

## 2024-01-01 PROCEDURE — 36556 INSERT NON-TUNNEL CV CATH: CPT | Performed by: INTERNAL MEDICINE

## 2024-01-01 PROCEDURE — 82550 ASSAY OF CK (CPK): CPT | Performed by: PHYSICIAN ASSISTANT

## 2024-01-01 PROCEDURE — 370N000017 HC ANESTHESIA TECHNICAL FEE, PER MIN: Performed by: INTERNAL MEDICINE

## 2024-01-01 PROCEDURE — 85730 THROMBOPLASTIN TIME PARTIAL: CPT

## 2024-01-01 PROCEDURE — 999N000185 HC STATISTIC TRANSPORT TIME EA 15 MIN

## 2024-01-01 PROCEDURE — 84155 ASSAY OF PROTEIN SERUM: CPT | Performed by: STUDENT IN AN ORGANIZED HEALTH CARE EDUCATION/TRAINING PROGRAM

## 2024-01-01 PROCEDURE — 06QM0ZZ REPAIR RIGHT FEMORAL VEIN, OPEN APPROACH: ICD-10-PCS | Performed by: SURGERY

## 2024-01-01 PROCEDURE — 93454 CORONARY ARTERY ANGIO S&I: CPT | Mod: 26 | Performed by: INTERNAL MEDICINE

## 2024-01-01 PROCEDURE — 250N000013 HC RX MED GY IP 250 OP 250 PS 637: Performed by: INTERNAL MEDICINE

## 2024-01-01 PROCEDURE — 93454 CORONARY ARTERY ANGIO S&I: CPT | Performed by: INTERNAL MEDICINE

## 2024-01-01 PROCEDURE — 370N000017 HC ANESTHESIA TECHNICAL FEE, PER MIN: Performed by: SURGERY

## 2024-01-01 PROCEDURE — 250N000011 HC RX IP 250 OP 636: Performed by: PHYSICIAN ASSISTANT

## 2024-01-01 DEVICE — STENT CORONARY DES SYNERGY XD MR US 2.50X32MM H7493941832250: Type: IMPLANTABLE DEVICE | Status: FUNCTIONAL

## 2024-01-01 DEVICE — CATH CENTRAL LINE MULTI LUMEN 7FRX20CM CDC-45703-XP1A: Type: IMPLANTABLE DEVICE | Status: FUNCTIONAL

## 2024-01-01 RX ORDER — HEPARIN SODIUM 1000 [USP'U]/ML
INJECTION, SOLUTION INTRAVENOUS; SUBCUTANEOUS
Status: DISCONTINUED | OUTPATIENT
Start: 2024-01-01 | End: 2024-01-01 | Stop reason: HOSPADM

## 2024-01-01 RX ORDER — NITROGLYCERIN 10 MG/100ML
INJECTION INTRAVENOUS PRN
Status: DISCONTINUED | OUTPATIENT
Start: 2024-01-01 | End: 2024-01-01

## 2024-01-01 RX ORDER — ONDANSETRON 4 MG/1
4 TABLET, ORALLY DISINTEGRATING ORAL EVERY 6 HOURS PRN
Status: DISCONTINUED | OUTPATIENT
Start: 2024-01-01 | End: 2024-01-01 | Stop reason: HOSPADM

## 2024-01-01 RX ORDER — NALOXONE HYDROCHLORIDE 0.4 MG/ML
0.2 INJECTION, SOLUTION INTRAMUSCULAR; INTRAVENOUS; SUBCUTANEOUS
Status: DISCONTINUED | OUTPATIENT
Start: 2024-01-01 | End: 2024-04-22 | Stop reason: HOSPADM

## 2024-01-01 RX ORDER — CALCIUM CHLORIDE, MAGNESIUM CHLORIDE, DEXTROSE MONOHYDRATE, LACTIC ACID, SODIUM CHLORIDE, SODIUM BICARBONATE AND POTASSIUM CHLORIDE 5.15; 2.03; 22; 5.4; 6.46; 3.09; .157 G/L; G/L; G/L; G/L; G/L; G/L; G/L
INJECTION INTRAVENOUS CONTINUOUS
Status: DISCONTINUED | OUTPATIENT
Start: 2024-01-01 | End: 2024-01-01 | Stop reason: ALTCHOICE

## 2024-01-01 RX ORDER — OXYCODONE HYDROCHLORIDE 5 MG/1
5 TABLET ORAL
Status: DISCONTINUED | OUTPATIENT
Start: 2024-01-01 | End: 2024-01-01 | Stop reason: HOSPADM

## 2024-01-01 RX ORDER — HEPARIN SODIUM 200 [USP'U]/100ML
3 INJECTION, SOLUTION INTRAVENOUS CONTINUOUS
Status: DISCONTINUED | OUTPATIENT
Start: 2024-01-01 | End: 2024-01-01

## 2024-01-01 RX ORDER — CALCIUM CHLORIDE, MAGNESIUM CHLORIDE, SODIUM CHLORIDE, SODIUM BICARBONATE, POTASSIUM CHLORIDE AND SODIUM PHOSPHATE DIBASIC DIHYDRATE 3.68; 3.05; 6.34; 3.09; .314; .187 G/L; G/L; G/L; G/L; G/L; G/L
12.5 INJECTION INTRAVENOUS CONTINUOUS
Status: DISCONTINUED | OUTPATIENT
Start: 2024-01-01 | End: 2024-01-01 | Stop reason: ALTCHOICE

## 2024-01-01 RX ORDER — PROCHLORPERAZINE MALEATE 10 MG
10 TABLET ORAL EVERY 6 HOURS PRN
Status: DISCONTINUED | OUTPATIENT
Start: 2024-01-01 | End: 2024-01-01 | Stop reason: HOSPADM

## 2024-01-01 RX ORDER — IOPAMIDOL 755 MG/ML
INJECTION, SOLUTION INTRAVASCULAR
Status: DISCONTINUED | OUTPATIENT
Start: 2024-01-01 | End: 2024-01-01 | Stop reason: HOSPADM

## 2024-01-01 RX ORDER — PROPOFOL 10 MG/ML
INJECTION, EMULSION INTRAVENOUS CONTINUOUS PRN
Status: DISCONTINUED | OUTPATIENT
Start: 2024-01-01 | End: 2024-01-01

## 2024-01-01 RX ORDER — ONDANSETRON 4 MG/1
4 TABLET, ORALLY DISINTEGRATING ORAL EVERY 30 MIN PRN
Status: DISCONTINUED | OUTPATIENT
Start: 2024-01-01 | End: 2024-01-01 | Stop reason: HOSPADM

## 2024-01-01 RX ORDER — MAGNESIUM SULFATE HEPTAHYDRATE 40 MG/ML
2 INJECTION, SOLUTION INTRAVENOUS EVERY 8 HOURS PRN
Status: DISCONTINUED | OUTPATIENT
Start: 2024-01-01 | End: 2024-04-22 | Stop reason: HOSPADM

## 2024-01-01 RX ORDER — FENTANYL CITRATE 50 UG/ML
25 INJECTION, SOLUTION INTRAMUSCULAR; INTRAVENOUS
Status: DISCONTINUED | OUTPATIENT
Start: 2024-01-01 | End: 2024-01-01 | Stop reason: HOSPADM

## 2024-01-01 RX ORDER — NALOXONE HYDROCHLORIDE 1 MG/ML
0.2 INJECTION INTRAMUSCULAR; INTRAVENOUS; SUBCUTANEOUS
Status: DISCONTINUED | OUTPATIENT
Start: 2024-01-01 | End: 2024-01-01 | Stop reason: HOSPADM

## 2024-01-01 RX ORDER — NALOXONE HYDROCHLORIDE 1 MG/ML
0.4 INJECTION INTRAMUSCULAR; INTRAVENOUS; SUBCUTANEOUS
Status: DISCONTINUED | OUTPATIENT
Start: 2024-01-01 | End: 2024-01-01 | Stop reason: HOSPADM

## 2024-01-01 RX ORDER — POTASSIUM CHLORIDE 29.8 MG/ML
20 INJECTION INTRAVENOUS
Status: DISCONTINUED | OUTPATIENT
Start: 2024-01-01 | End: 2024-04-22 | Stop reason: HOSPADM

## 2024-01-01 RX ORDER — DEXTROSE MONOHYDRATE 25 G/50ML
25-50 INJECTION, SOLUTION INTRAVENOUS
Status: DISCONTINUED | OUTPATIENT
Start: 2024-01-01 | End: 2024-04-22 | Stop reason: HOSPADM

## 2024-01-01 RX ORDER — NALOXONE HYDROCHLORIDE 1 MG/ML
0.1 INJECTION INTRAMUSCULAR; INTRAVENOUS; SUBCUTANEOUS
Status: DISCONTINUED | OUTPATIENT
Start: 2024-01-01 | End: 2024-01-01 | Stop reason: HOSPADM

## 2024-01-01 RX ORDER — NITROGLYCERIN 5 MG/ML
VIAL (ML) INTRAVENOUS
Status: DISCONTINUED | OUTPATIENT
Start: 2024-01-01 | End: 2024-01-01 | Stop reason: HOSPADM

## 2024-01-01 RX ORDER — NALOXONE HYDROCHLORIDE 0.4 MG/ML
0.4 INJECTION, SOLUTION INTRAMUSCULAR; INTRAVENOUS; SUBCUTANEOUS
Status: DISCONTINUED | OUTPATIENT
Start: 2024-01-01 | End: 2024-04-22 | Stop reason: HOSPADM

## 2024-01-01 RX ORDER — DEXTROSE 20 G/100ML
INJECTION, SOLUTION INTRAVENOUS CONTINUOUS
Status: DISCONTINUED | OUTPATIENT
Start: 2024-01-01 | End: 2024-04-22 | Stop reason: HOSPADM

## 2024-01-01 RX ORDER — IPRATROPIUM BROMIDE AND ALBUTEROL SULFATE 2.5; .5 MG/3ML; MG/3ML
3 SOLUTION RESPIRATORY (INHALATION)
Status: DISCONTINUED | OUTPATIENT
Start: 2024-01-01 | End: 2024-04-22 | Stop reason: HOSPADM

## 2024-01-01 RX ORDER — OXYCODONE HYDROCHLORIDE 5 MG/1
10 TABLET ORAL
Status: DISCONTINUED | OUTPATIENT
Start: 2024-01-01 | End: 2024-01-01 | Stop reason: HOSPADM

## 2024-01-01 RX ORDER — CALCIUM CHLORIDE 100 MG/ML
INJECTION INTRAVENOUS; INTRAVENTRICULAR
Status: COMPLETED
Start: 2024-01-01 | End: 2024-01-01

## 2024-01-01 RX ORDER — PIPERACILLIN SODIUM, TAZOBACTAM SODIUM 2; .25 G/10ML; G/10ML
2.25 INJECTION, POWDER, LYOPHILIZED, FOR SOLUTION INTRAVENOUS EVERY 6 HOURS
Status: DISCONTINUED | OUTPATIENT
Start: 2024-01-01 | End: 2024-01-01

## 2024-01-01 RX ORDER — HEPARIN SODIUM 1000 [USP'U]/ML
INJECTION, SOLUTION INTRAVENOUS; SUBCUTANEOUS PRN
Status: DISCONTINUED | OUTPATIENT
Start: 2024-01-01 | End: 2024-01-01

## 2024-01-01 RX ORDER — PIPERACILLIN SODIUM, TAZOBACTAM SODIUM 4; .5 G/20ML; G/20ML
4.5 INJECTION, POWDER, LYOPHILIZED, FOR SOLUTION INTRAVENOUS EVERY 6 HOURS
Status: DISCONTINUED | OUTPATIENT
Start: 2024-04-22 | End: 2024-04-22 | Stop reason: HOSPADM

## 2024-01-01 RX ORDER — MIDAZOLAM HCL IN 0.9 % NACL/PF 1 MG/ML
1-8 PLASTIC BAG, INJECTION (ML) INTRAVENOUS CONTINUOUS
Status: DISCONTINUED | OUTPATIENT
Start: 2024-01-01 | End: 2024-04-22 | Stop reason: HOSPADM

## 2024-01-01 RX ORDER — CEFTRIAXONE 2 G/1
2 INJECTION, POWDER, FOR SOLUTION INTRAMUSCULAR; INTRAVENOUS EVERY 24 HOURS
Status: DISCONTINUED | OUTPATIENT
Start: 2024-01-01 | End: 2024-01-01

## 2024-01-01 RX ORDER — DEXTROSE MONOHYDRATE 50 MG/ML
INJECTION, SOLUTION INTRAVENOUS CONTINUOUS
Status: DISCONTINUED | OUTPATIENT
Start: 2024-01-01 | End: 2024-01-01

## 2024-01-01 RX ORDER — NICOTINE POLACRILEX 4 MG
15-30 LOZENGE BUCCAL
Status: DISCONTINUED | OUTPATIENT
Start: 2024-01-01 | End: 2024-04-22 | Stop reason: HOSPADM

## 2024-01-01 RX ORDER — POTASSIUM CHLORIDE 29.8 MG/ML
20 INJECTION INTRAVENOUS ONCE
Status: COMPLETED | OUTPATIENT
Start: 2024-01-01 | End: 2024-01-01

## 2024-01-01 RX ORDER — PROTAMINE SULFATE 10 MG/ML
INJECTION, SOLUTION INTRAVENOUS PRN
Status: DISCONTINUED | OUTPATIENT
Start: 2024-01-01 | End: 2024-01-01

## 2024-01-01 RX ORDER — POTASSIUM CHLORIDE 29.8 MG/ML
20 INJECTION INTRAVENOUS EVERY 8 HOURS PRN
Status: DISCONTINUED | OUTPATIENT
Start: 2024-01-01 | End: 2024-04-22 | Stop reason: HOSPADM

## 2024-01-01 RX ORDER — ASPIRIN 325 MG
TABLET ORAL
Status: DISCONTINUED | OUTPATIENT
Start: 2024-01-01 | End: 2024-01-01 | Stop reason: HOSPADM

## 2024-01-01 RX ORDER — LIDOCAINE 40 MG/G
CREAM TOPICAL
Status: DISCONTINUED | OUTPATIENT
Start: 2024-01-01 | End: 2024-01-01 | Stop reason: HOSPADM

## 2024-01-01 RX ORDER — HEPARIN SODIUM 10000 [USP'U]/100ML
10-50 INJECTION, SOLUTION INTRAVENOUS CONTINUOUS
Status: DISCONTINUED | OUTPATIENT
Start: 2024-01-01 | End: 2024-01-01

## 2024-01-01 RX ORDER — CALCIUM CHLORIDE, MAGNESIUM CHLORIDE, SODIUM CHLORIDE, SODIUM BICARBONATE, POTASSIUM CHLORIDE AND SODIUM PHOSPHATE DIBASIC DIHYDRATE 3.68; 3.05; 6.34; 3.09; .314; .187 G/L; G/L; G/L; G/L; G/L; G/L
INJECTION INTRAVENOUS CONTINUOUS
Status: DISCONTINUED | OUTPATIENT
Start: 2024-01-01 | End: 2024-01-01 | Stop reason: ALTCHOICE

## 2024-01-01 RX ORDER — TROMETHAMINE IN STERILE WATER 1.8 G/50ML
500 SYRINGE (ML) INTRAVENOUS ONCE
Status: COMPLETED | OUTPATIENT
Start: 2024-01-01 | End: 2024-01-01

## 2024-01-01 RX ORDER — ASPIRIN 81 MG/1
81 TABLET, CHEWABLE ORAL DAILY
Status: DISCONTINUED | OUTPATIENT
Start: 2024-01-01 | End: 2024-04-22 | Stop reason: HOSPADM

## 2024-01-01 RX ORDER — FENTANYL CITRATE 50 UG/ML
INJECTION, SOLUTION INTRAMUSCULAR; INTRAVENOUS
Status: DISCONTINUED | OUTPATIENT
Start: 2024-01-01 | End: 2024-01-01 | Stop reason: HOSPADM

## 2024-01-01 RX ORDER — MAGNESIUM SULFATE HEPTAHYDRATE 40 MG/ML
4 INJECTION, SOLUTION INTRAVENOUS EVERY 4 HOURS PRN
Status: DISCONTINUED | OUTPATIENT
Start: 2024-01-01 | End: 2024-04-22 | Stop reason: HOSPADM

## 2024-01-01 RX ORDER — SODIUM CHLORIDE, SODIUM GLUCONATE, SODIUM ACETATE, POTASSIUM CHLORIDE AND MAGNESIUM CHLORIDE 526; 502; 368; 37; 30 MG/100ML; MG/100ML; MG/100ML; MG/100ML; MG/100ML
INJECTION, SOLUTION INTRAVENOUS CONTINUOUS PRN
Status: DISCONTINUED | OUTPATIENT
Start: 2024-01-01 | End: 2024-01-01

## 2024-01-01 RX ORDER — LIDOCAINE HYDROCHLORIDE 10 MG/ML
INJECTION, SOLUTION INFILTRATION; PERINEURAL PRN
Status: DISCONTINUED | OUTPATIENT
Start: 2024-01-01 | End: 2024-01-01

## 2024-01-01 RX ORDER — CALCIUM CHLORIDE 100 MG/ML
INJECTION INTRAVENOUS; INTRAVENTRICULAR PRN
Status: DISCONTINUED | OUTPATIENT
Start: 2024-01-01 | End: 2024-01-01

## 2024-01-01 RX ORDER — PHENYLEPHRINE HCL IN 0.9% NACL 50MG/250ML
.1-6 PLASTIC BAG, INJECTION (ML) INTRAVENOUS CONTINUOUS
Status: DISCONTINUED | OUTPATIENT
Start: 2024-01-01 | End: 2024-04-22 | Stop reason: HOSPADM

## 2024-01-01 RX ORDER — NOREPINEPHRINE BITARTRATE 0.06 MG/ML
.01-.6 INJECTION, SOLUTION INTRAVENOUS CONTINUOUS
Status: DISCONTINUED | OUTPATIENT
Start: 2024-01-01 | End: 2024-04-22 | Stop reason: HOSPADM

## 2024-01-01 RX ORDER — FLUMAZENIL 0.1 MG/ML
0.2 INJECTION, SOLUTION INTRAVENOUS
Status: DISCONTINUED | OUTPATIENT
Start: 2024-01-01 | End: 2024-01-01 | Stop reason: HOSPADM

## 2024-01-01 RX ORDER — SODIUM CHLORIDE, SODIUM LACTATE, POTASSIUM CHLORIDE, CALCIUM CHLORIDE 600; 310; 30; 20 MG/100ML; MG/100ML; MG/100ML; MG/100ML
INJECTION, SOLUTION INTRAVENOUS CONTINUOUS
Status: DISCONTINUED | OUTPATIENT
Start: 2024-01-01 | End: 2024-01-01 | Stop reason: HOSPADM

## 2024-01-01 RX ORDER — MIDAZOLAM HCL IN 0.9 % NACL/PF 1 MG/ML
PLASTIC BAG, INJECTION (ML) INTRAVENOUS CONTINUOUS PRN
Status: COMPLETED | OUTPATIENT
Start: 2024-01-01 | End: 2024-01-01

## 2024-01-01 RX ORDER — HYDROCHLOROTHIAZIDE 12.5 MG/1
12.5 TABLET ORAL DAILY
COMMUNITY

## 2024-01-01 RX ORDER — FLUOCINONIDE 0.5 MG/G
CREAM TOPICAL 3 TIMES DAILY
COMMUNITY

## 2024-01-01 RX ORDER — PROPOFOL 10 MG/ML
INJECTION, EMULSION INTRAVENOUS PRN
Status: DISCONTINUED | OUTPATIENT
Start: 2024-01-01 | End: 2024-01-01

## 2024-01-01 RX ORDER — DEXTROSE MONOHYDRATE 25 G/50ML
25-50 INJECTION, SOLUTION INTRAVENOUS
Status: DISCONTINUED | OUTPATIENT
Start: 2024-01-01 | End: 2024-01-01

## 2024-01-01 RX ORDER — CALCIUM GLUCONATE 20 MG/ML
2 INJECTION, SOLUTION INTRAVENOUS EVERY 8 HOURS PRN
Status: DISCONTINUED | OUTPATIENT
Start: 2024-01-01 | End: 2024-04-22 | Stop reason: HOSPADM

## 2024-01-01 RX ORDER — CHLORHEXIDINE GLUCONATE ORAL RINSE 1.2 MG/ML
15 SOLUTION DENTAL 2 TIMES DAILY
Status: DISCONTINUED | OUTPATIENT
Start: 2024-01-01 | End: 2024-04-22 | Stop reason: HOSPADM

## 2024-01-01 RX ORDER — LIDOCAINE 40 MG/G
CREAM TOPICAL
Status: DISCONTINUED | OUTPATIENT
Start: 2024-01-01 | End: 2024-04-22 | Stop reason: HOSPADM

## 2024-01-01 RX ORDER — CALCIUM CHLORIDE 100 MG/ML
INJECTION INTRAVENOUS; INTRAVENTRICULAR
Status: DISCONTINUED
Start: 2024-01-01 | End: 2024-04-22 | Stop reason: HOSPADM

## 2024-01-01 RX ORDER — VANCOMYCIN HYDROCHLORIDE 500 MG/10ML
500 INJECTION, POWDER, LYOPHILIZED, FOR SOLUTION INTRAVENOUS ONCE
Status: DISCONTINUED | OUTPATIENT
Start: 2024-01-01 | End: 2024-01-01

## 2024-01-01 RX ORDER — ALBUTEROL SULFATE 0.83 MG/ML
2.5 SOLUTION RESPIRATORY (INHALATION) EVERY 6 HOURS PRN
Status: DISCONTINUED | OUTPATIENT
Start: 2024-01-01 | End: 2024-04-22 | Stop reason: HOSPADM

## 2024-01-01 RX ORDER — LABETALOL HYDROCHLORIDE 5 MG/ML
INJECTION, SOLUTION INTRAVENOUS PRN
Status: DISCONTINUED | OUTPATIENT
Start: 2024-01-01 | End: 2024-01-01

## 2024-01-01 RX ORDER — CALCIUM GLUCONATE 20 MG/ML
2 INJECTION, SOLUTION INTRAVENOUS ONCE
Status: DISCONTINUED | OUTPATIENT
Start: 2024-04-22 | End: 2024-04-22 | Stop reason: HOSPADM

## 2024-01-01 RX ORDER — VANCOMYCIN HYDROCHLORIDE 1 G/200ML
1000 INJECTION, SOLUTION INTRAVENOUS ONCE
Status: COMPLETED | OUTPATIENT
Start: 2024-01-01 | End: 2024-01-01

## 2024-01-01 RX ORDER — HEPARIN SODIUM 10000 [USP'U]/100ML
0-5000 INJECTION, SOLUTION INTRAVENOUS CONTINUOUS
Status: DISCONTINUED | OUTPATIENT
Start: 2024-01-01 | End: 2024-04-22 | Stop reason: HOSPADM

## 2024-01-01 RX ORDER — HALOPERIDOL 5 MG/ML
1 INJECTION INTRAMUSCULAR
Status: DISCONTINUED | OUTPATIENT
Start: 2024-01-01 | End: 2024-01-01 | Stop reason: HOSPADM

## 2024-01-01 RX ORDER — CALCIUM CHLORIDE, MAGNESIUM CHLORIDE, DEXTROSE MONOHYDRATE, LACTIC ACID, SODIUM CHLORIDE, SODIUM BICARBONATE AND POTASSIUM CHLORIDE 5.15; 2.03; 22; 5.4; 6.46; 3.09; .157 G/L; G/L; G/L; G/L; G/L; G/L; G/L
12.5 INJECTION INTRAVENOUS CONTINUOUS
Status: DISCONTINUED | OUTPATIENT
Start: 2024-01-01 | End: 2024-01-01 | Stop reason: ALTCHOICE

## 2024-01-01 RX ORDER — HEPARIN SODIUM 10000 [USP'U]/100ML
0-5000 INJECTION, SOLUTION INTRAVENOUS CONTINUOUS
Status: DISCONTINUED | OUTPATIENT
Start: 2024-01-01 | End: 2024-01-01

## 2024-01-01 RX ORDER — MAGNESIUM SULFATE HEPTAHYDRATE 40 MG/ML
2 INJECTION, SOLUTION INTRAVENOUS DAILY PRN
Status: DISCONTINUED | OUTPATIENT
Start: 2024-01-01 | End: 2024-04-22 | Stop reason: HOSPADM

## 2024-01-01 RX ORDER — OXYCODONE AND ACETAMINOPHEN 10; 325 MG/1; MG/1
1 TABLET ORAL EVERY 6 HOURS
COMMUNITY
Start: 2024-01-01

## 2024-01-01 RX ORDER — DEXTROSE MONOHYDRATE 25 G/50ML
25 INJECTION, SOLUTION INTRAVENOUS ONCE
Status: COMPLETED | OUTPATIENT
Start: 2024-01-01 | End: 2024-01-01

## 2024-01-01 RX ORDER — ONDANSETRON 2 MG/ML
4 INJECTION INTRAMUSCULAR; INTRAVENOUS EVERY 6 HOURS PRN
Status: DISCONTINUED | OUTPATIENT
Start: 2024-01-01 | End: 2024-01-01 | Stop reason: HOSPADM

## 2024-01-01 RX ORDER — ONDANSETRON 2 MG/ML
4 INJECTION INTRAMUSCULAR; INTRAVENOUS EVERY 30 MIN PRN
Status: DISCONTINUED | OUTPATIENT
Start: 2024-01-01 | End: 2024-01-01 | Stop reason: HOSPADM

## 2024-01-01 RX ORDER — NICOTINE POLACRILEX 4 MG
15-30 LOZENGE BUCCAL
Status: DISCONTINUED | OUTPATIENT
Start: 2024-01-01 | End: 2024-01-01

## 2024-01-01 RX ORDER — CALCIUM CHLORIDE, MAGNESIUM CHLORIDE, DEXTROSE MONOHYDRATE, LACTIC ACID, SODIUM CHLORIDE, SODIUM BICARBONATE AND POTASSIUM CHLORIDE 5.15; 2.03; 22; 5.4; 6.46; 3.09; .157 G/L; G/L; G/L; G/L; G/L; G/L; G/L
12.5 INJECTION INTRAVENOUS CONTINUOUS
Status: DISCONTINUED | OUTPATIENT
Start: 2024-01-01 | End: 2024-04-22 | Stop reason: HOSPADM

## 2024-01-01 RX ORDER — DEXTROSE MONOHYDRATE 100 MG/ML
INJECTION, SOLUTION INTRAVENOUS CONTINUOUS PRN
Status: DISCONTINUED | OUTPATIENT
Start: 2024-01-01 | End: 2024-04-22 | Stop reason: HOSPADM

## 2024-01-01 RX ORDER — NOREPINEPHRINE BITARTRATE 0.06 MG/ML
INJECTION, SOLUTION INTRAVENOUS CONTINUOUS PRN
Status: DISCONTINUED | OUTPATIENT
Start: 2024-01-01 | End: 2024-01-01 | Stop reason: HOSPADM

## 2024-01-01 RX ADMIN — SODIUM BICARBONATE 50 MEQ: 84 INJECTION, SOLUTION INTRAVENOUS at 21:04

## 2024-01-01 RX ADMIN — PROTAMINE SULFATE 50 MG: 10 INJECTION, SOLUTION INTRAVENOUS at 16:42

## 2024-01-01 RX ADMIN — Medication 50 MEQ: at 20:00

## 2024-01-01 RX ADMIN — CALCIUM CHLORIDE 1 G: 100 INJECTION INTRAVENOUS; INTRAVENTRICULAR at 22:50

## 2024-01-01 RX ADMIN — PROPOFOL 30 MG: 10 INJECTION, EMULSION INTRAVENOUS at 10:58

## 2024-01-01 RX ADMIN — NITROGLYCERIN 200 MCG: 10 INJECTION INTRAVENOUS at 16:33

## 2024-01-01 RX ADMIN — DEXTROSE MONOHYDRATE 50 ML: 25 INJECTION, SOLUTION INTRAVENOUS at 13:44

## 2024-01-01 RX ADMIN — SODIUM CHLORIDE, SODIUM GLUCONATE, SODIUM ACETATE, POTASSIUM CHLORIDE AND MAGNESIUM CHLORIDE: 526; 502; 368; 37; 30 INJECTION, SOLUTION INTRAVENOUS at 15:25

## 2024-01-01 RX ADMIN — SODIUM BICARBONATE 50 MEQ: 84 INJECTION, SOLUTION INTRAVENOUS at 05:10

## 2024-01-01 RX ADMIN — SODIUM CHLORIDE 5 UNITS: 9 INJECTION, SOLUTION INTRAVENOUS at 21:30

## 2024-01-01 RX ADMIN — NOREPINEPHRINE BITARTRATE 0.05 MCG/KG/MIN: 0.06 INJECTION, SOLUTION INTRAVENOUS at 15:32

## 2024-01-01 RX ADMIN — SODIUM BICARBONATE 50 MEQ: 84 INJECTION, SOLUTION INTRAVENOUS at 18:58

## 2024-01-01 RX ADMIN — TROMETHAMINE 500 ML: 3.6 INJECTION, SOLUTION INTRAVENOUS at 22:05

## 2024-01-01 RX ADMIN — MAGNESIUM SULFATE IN WATER 4 G: 40 INJECTION, SOLUTION INTRAVENOUS at 17:48

## 2024-01-01 RX ADMIN — CALCIUM CHLORIDE 1 G: 100 INJECTION INTRAVENOUS; INTRAVENTRICULAR at 05:18

## 2024-01-01 RX ADMIN — VANCOMYCIN HYDROCHLORIDE 1000 MG: 1 INJECTION, SOLUTION INTRAVENOUS at 16:52

## 2024-01-01 RX ADMIN — SODIUM CHLORIDE 1 MG/MIN: 9 INJECTION, SOLUTION INTRAVENOUS at 15:44

## 2024-01-01 RX ADMIN — CEFTRIAXONE SODIUM 2 G: 2 INJECTION, POWDER, FOR SOLUTION INTRAMUSCULAR; INTRAVENOUS at 14:24

## 2024-01-01 RX ADMIN — EPINEPHRINE 0.08 MCG/KG/MIN: 1 INJECTION INTRAMUSCULAR; INTRAVENOUS; SUBCUTANEOUS at 17:05

## 2024-01-01 RX ADMIN — SODIUM PHOSPHATE, MONOBASIC, MONOHYDRATE AND SODIUM PHOSPHATE, DIBASIC, ANHYDROUS 10 MMOL: 142; 276 INJECTION, SOLUTION INTRAVENOUS at 00:15

## 2024-01-01 RX ADMIN — EPOPROSTENOL 5 NG/KG/MIN: 1.5 INJECTION, POWDER, LYOPHILIZED, FOR SOLUTION INTRAVENOUS at 22:51

## 2024-01-01 RX ADMIN — LIDOCAINE HYDROCHLORIDE 65 ML: 10 INJECTION, SOLUTION INFILTRATION; PERINEURAL at 10:57

## 2024-01-01 RX ADMIN — PIPERACILLIN SODIUM AND TAZOBACTAM SODIUM 2.25 G: 2; .25 INJECTION, POWDER, LYOPHILIZED, FOR SOLUTION INTRAVENOUS at 11:23

## 2024-01-01 RX ADMIN — INSULIN HUMAN 4 UNITS/HR: 1 INJECTION, SOLUTION INTRAVENOUS at 18:16

## 2024-01-01 RX ADMIN — TICAGRELOR 90 MG: 90 TABLET ORAL at 08:27

## 2024-01-01 RX ADMIN — NITROGLYCERIN 100 MCG: 10 INJECTION INTRAVENOUS at 16:18

## 2024-01-01 RX ADMIN — MIDAZOLAM 1 MG: 1 INJECTION INTRAMUSCULAR; INTRAVENOUS at 11:36

## 2024-01-01 RX ADMIN — HEPARIN SODIUM 650 UNITS/HR: 10000 INJECTION, SOLUTION INTRAVENOUS at 20:10

## 2024-01-01 RX ADMIN — DEXTROSE MONOHYDRATE 50 ML: 25 INJECTION, SOLUTION INTRAVENOUS at 23:12

## 2024-01-01 RX ADMIN — Medication 4 UNITS/HR: at 15:19

## 2024-01-01 RX ADMIN — SODIUM BICARBONATE 50 MEQ: 84 INJECTION, SOLUTION INTRAVENOUS at 16:13

## 2024-01-01 RX ADMIN — PROPOFOL 20 MG: 10 INJECTION, EMULSION INTRAVENOUS at 11:13

## 2024-01-01 RX ADMIN — CALCIUM CHLORIDE INJECTION: 100 INJECTION, SOLUTION INTRAVENOUS at 19:32

## 2024-01-01 RX ADMIN — SODIUM PHOSPHATE, MONOBASIC, MONOHYDRATE AND SODIUM PHOSPHATE, DIBASIC, ANHYDROUS 10 MMOL: 142; 276 INJECTION, SOLUTION INTRAVENOUS at 23:50

## 2024-01-01 RX ADMIN — DEXMEDETOMIDINE HYDROCHLORIDE 4 MCG: 100 INJECTION, SOLUTION INTRAVENOUS at 11:31

## 2024-01-01 RX ADMIN — Medication 4 UNITS/HR: at 13:12

## 2024-01-01 RX ADMIN — PROPOFOL 20 MG: 10 INJECTION, EMULSION INTRAVENOUS at 10:59

## 2024-01-01 RX ADMIN — CALCIUM GLUCONATE 2 G: 20 INJECTION, SOLUTION INTRAVENOUS at 20:13

## 2024-01-01 RX ADMIN — HYDROCORTISONE SODIUM SUCCINATE 100 MG: 100 INJECTION, POWDER, FOR SOLUTION INTRAMUSCULAR; INTRAVENOUS at 20:06

## 2024-01-01 RX ADMIN — PIPERACILLIN SODIUM AND TAZOBACTAM SODIUM 4.5 G: 4; .5 INJECTION, POWDER, LYOPHILIZED, FOR SOLUTION INTRAVENOUS at 23:31

## 2024-01-01 RX ADMIN — SODIUM CHLORIDE 1 MG/MIN: 9 INJECTION, SOLUTION INTRAVENOUS at 13:00

## 2024-01-01 RX ADMIN — SODIUM BICARBONATE 100 MEQ: 84 INJECTION, SOLUTION INTRAVENOUS at 18:45

## 2024-01-01 RX ADMIN — EPINEPHRINE 0.25 MCG/KG/MIN: 1 INJECTION INTRAMUSCULAR; INTRAVENOUS; SUBCUTANEOUS at 20:56

## 2024-01-01 RX ADMIN — CALCIUM CHLORIDE INJECTION 500 MG: 100 INJECTION, SOLUTION INTRAVENOUS at 16:45

## 2024-01-01 RX ADMIN — SODIUM BICARBONATE 150 MEQ/HR: 84 INJECTION, SOLUTION INTRAVENOUS at 10:13

## 2024-01-01 RX ADMIN — Medication 100 MEQ: at 18:45

## 2024-01-01 RX ADMIN — CALCIUM CHLORIDE INJECTION 250 MG: 100 INJECTION, SOLUTION INTRAVENOUS at 16:16

## 2024-01-01 RX ADMIN — DEXTROSE MONOHYDRATE 25 G: 25 INJECTION, SOLUTION INTRAVENOUS at 21:15

## 2024-01-01 RX ADMIN — NITROGLYCERIN 200 MCG: 10 INJECTION INTRAVENOUS at 16:29

## 2024-01-01 RX ADMIN — WHITE PETROLATUM 57.7 %-MINERAL OIL 31.9 % EYE OINTMENT: at 19:30

## 2024-01-01 RX ADMIN — CALCIUM CHLORIDE, MAGNESIUM CHLORIDE, DEXTROSE MONOHYDRATE, LACTIC ACID, SODIUM CHLORIDE, SODIUM BICARBONATE AND POTASSIUM CHLORIDE 12.5 ML/KG/HR: 5.15; 2.03; 22; 5.4; 6.46; 3.09; .157 INJECTION INTRAVENOUS at 21:01

## 2024-01-01 RX ADMIN — SODIUM BICARBONATE: 84 INJECTION, SOLUTION INTRAVENOUS at 23:06

## 2024-01-01 RX ADMIN — Medication 40 MG: at 16:03

## 2024-01-01 RX ADMIN — TICAGRELOR 90 MG: 90 TABLET ORAL at 20:06

## 2024-01-01 RX ADMIN — NITROGLYCERIN 100 MCG: 10 INJECTION INTRAVENOUS at 16:17

## 2024-01-01 RX ADMIN — DEXTROSE MONOHYDRATE 50 ML: 25 INJECTION, SOLUTION INTRAVENOUS at 18:44

## 2024-01-01 RX ADMIN — CALCIUM CHLORIDE INJECTION 250 MG: 100 INJECTION, SOLUTION INTRAVENOUS at 16:12

## 2024-01-01 RX ADMIN — DEXTROSE MONOHYDRATE 50 ML: 25 INJECTION, SOLUTION INTRAVENOUS at 04:08

## 2024-01-01 RX ADMIN — SUGAMMADEX 100 MG: 100 INJECTION, SOLUTION INTRAVENOUS at 17:01

## 2024-01-01 RX ADMIN — MIDAZOLAM 1 MG: 1 INJECTION INTRAMUSCULAR; INTRAVENOUS at 10:55

## 2024-01-01 RX ADMIN — CHLORHEXIDINE GLUCONATE 0.12% ORAL RINSE 15 ML: 1.2 LIQUID ORAL at 20:06

## 2024-01-01 RX ADMIN — ALBUTEROL SULFATE 2.5 MG: 2.5 SOLUTION RESPIRATORY (INHALATION) at 21:30

## 2024-01-01 RX ADMIN — SODIUM CHLORIDE 1 MG/MIN: 9 INJECTION, SOLUTION INTRAVENOUS at 18:36

## 2024-01-01 RX ADMIN — PANTOPRAZOLE SODIUM 40 MG: 40 INJECTION, POWDER, FOR SOLUTION INTRAVENOUS at 14:25

## 2024-01-01 RX ADMIN — HUMAN INSULIN 10 UNITS: 100 INJECTION, SOLUTION SUBCUTANEOUS at 18:44

## 2024-01-01 RX ADMIN — SODIUM BICARBONATE 50 MEQ: 84 INJECTION, SOLUTION INTRAVENOUS at 03:50

## 2024-01-01 RX ADMIN — PROPOFOL 20 MG: 10 INJECTION, EMULSION INTRAVENOUS at 10:57

## 2024-01-01 RX ADMIN — DEXMEDETOMIDINE HYDROCHLORIDE 4 MCG: 100 INJECTION, SOLUTION INTRAVENOUS at 11:33

## 2024-01-01 RX ADMIN — DEXTROSE: 20 INJECTION, SOLUTION INTRAVENOUS at 21:20

## 2024-01-01 RX ADMIN — PROPOFOL 20 MG: 10 INJECTION, EMULSION INTRAVENOUS at 11:16

## 2024-01-01 RX ADMIN — CALCIUM CHLORIDE, MAGNESIUM CHLORIDE, DEXTROSE MONOHYDRATE, LACTIC ACID, SODIUM CHLORIDE, SODIUM BICARBONATE AND POTASSIUM CHLORIDE 12.5 ML/KG/HR: 5.15; 2.03; 22; 5.4; 6.46; 3.09; .157 INJECTION INTRAVENOUS at 21:00

## 2024-01-01 RX ADMIN — DEXTROSE MONOHYDRATE 25 ML: 25 INJECTION, SOLUTION INTRAVENOUS at 10:00

## 2024-01-01 RX ADMIN — SODIUM BICARBONATE 50 MEQ: 84 INJECTION, SOLUTION INTRAVENOUS at 20:00

## 2024-01-01 RX ADMIN — Medication 50 MCG/HR: at 15:31

## 2024-01-01 RX ADMIN — SODIUM BICARBONATE 50 MEQ: 84 INJECTION, SOLUTION INTRAVENOUS at 21:41

## 2024-01-01 RX ADMIN — HEPARIN SODIUM 5000 UNITS: 1000 INJECTION INTRAVENOUS; SUBCUTANEOUS at 16:05

## 2024-01-01 RX ADMIN — IPRATROPIUM BROMIDE AND ALBUTEROL SULFATE 3 ML: .5; 3 SOLUTION RESPIRATORY (INHALATION) at 19:59

## 2024-01-01 RX ADMIN — SODIUM BICARBONATE 150 MEQ/HR: 84 INJECTION, SOLUTION INTRAVENOUS at 06:45

## 2024-01-01 RX ADMIN — SODIUM CHLORIDE, POTASSIUM CHLORIDE, SODIUM LACTATE AND CALCIUM CHLORIDE: 600; 310; 30; 20 INJECTION, SOLUTION INTRAVENOUS at 09:56

## 2024-01-01 RX ADMIN — PROPOFOL 150 MCG/KG/MIN: 10 INJECTION, EMULSION INTRAVENOUS at 10:57

## 2024-01-01 RX ADMIN — ASPIRIN 81 MG CHEWABLE TABLET 81 MG: 81 TABLET CHEWABLE at 08:27

## 2024-01-01 RX ADMIN — DEXMEDETOMIDINE HYDROCHLORIDE 12 MCG: 100 INJECTION, SOLUTION INTRAVENOUS at 11:36

## 2024-01-01 RX ADMIN — SODIUM BICARBONATE 50 MEQ: 84 INJECTION, SOLUTION INTRAVENOUS at 20:24

## 2024-01-01 RX ADMIN — SODIUM CHLORIDE, POTASSIUM CHLORIDE, SODIUM LACTATE AND CALCIUM CHLORIDE 500 ML: 600; 310; 30; 20 INJECTION, SOLUTION INTRAVENOUS at 18:45

## 2024-01-01 RX ADMIN — PROPOFOL 20 MG: 10 INJECTION, EMULSION INTRAVENOUS at 11:12

## 2024-01-01 RX ADMIN — DEXTROSE MONOHYDRATE 25 ML: 25 INJECTION, SOLUTION INTRAVENOUS at 12:14

## 2024-01-01 RX ADMIN — Medication 0.5 MCG/KG/MIN: at 23:03

## 2024-01-01 RX ADMIN — SODIUM BICARBONATE 50 MEQ: 84 INJECTION, SOLUTION INTRAVENOUS at 23:26

## 2024-01-01 RX ADMIN — PIPERACILLIN SODIUM AND TAZOBACTAM SODIUM 2.25 G: 2; .25 INJECTION, POWDER, LYOPHILIZED, FOR SOLUTION INTRAVENOUS at 17:53

## 2024-01-01 RX ADMIN — WHITE PETROLATUM 57.7 %-MINERAL OIL 31.9 % EYE OINTMENT: at 03:56

## 2024-01-01 RX ADMIN — POTASSIUM CHLORIDE 20 MEQ: 29.8 INJECTION, SOLUTION INTRAVENOUS at 15:09

## 2024-01-01 RX ADMIN — DEXTROSE MONOHYDRATE 25 ML: 25 INJECTION, SOLUTION INTRAVENOUS at 02:12

## 2024-01-01 RX ADMIN — NICARDIPINE HYDROCHLORIDE 2.5 MG/HR: 0.2 INJECTION, SOLUTION INTRAVENOUS at 11:20

## 2024-01-01 RX ADMIN — PANTOPRAZOLE SODIUM 40 MG: 40 INJECTION, POWDER, FOR SOLUTION INTRAVENOUS at 08:33

## 2024-01-01 RX ADMIN — CHLORHEXIDINE GLUCONATE 0.12% ORAL RINSE 15 ML: 1.2 LIQUID ORAL at 19:30

## 2024-01-01 RX ADMIN — POTASSIUM CHLORIDE 20 MEQ: 29.8 INJECTION, SOLUTION INTRAVENOUS at 23:07

## 2024-01-01 RX ADMIN — LABETALOL HYDROCHLORIDE 10 MG: 5 INJECTION, SOLUTION INTRAVENOUS at 11:10

## 2024-01-01 RX ADMIN — ANGIOTENSIN II 20 NG/KG/MIN: 2.5 INJECTION INTRAVENOUS at 21:33

## 2024-01-01 RX ADMIN — CHLORHEXIDINE GLUCONATE 0.12% ORAL RINSE 15 ML: 1.2 LIQUID ORAL at 08:33

## 2024-01-01 ASSESSMENT — ACTIVITIES OF DAILY LIVING (ADL)
ADLS_ACUITY_SCORE: 35
ADLS_ACUITY_SCORE: 35
ADLS_ACUITY_SCORE: 55
ADLS_ACUITY_SCORE: 51
ADLS_ACUITY_SCORE: 59
ADLS_ACUITY_SCORE: 35
ADLS_ACUITY_SCORE: 35
ADLS_ACUITY_SCORE: 51
ADLS_ACUITY_SCORE: 35
ADLS_ACUITY_SCORE: 35
ADLS_ACUITY_SCORE: 55
ADLS_ACUITY_SCORE: 35
ADLS_ACUITY_SCORE: 35
ADLS_ACUITY_SCORE: 59
ADLS_ACUITY_SCORE: 55
ADLS_ACUITY_SCORE: 55
ADLS_ACUITY_SCORE: 51
ADLS_ACUITY_SCORE: 55
ADLS_ACUITY_SCORE: 59
ADLS_ACUITY_SCORE: 51
ADLS_ACUITY_SCORE: 55
ADLS_ACUITY_SCORE: 51
ADLS_ACUITY_SCORE: 55
ADLS_ACUITY_SCORE: 55
ADLS_ACUITY_SCORE: 59
ADLS_ACUITY_SCORE: 35
ADLS_ACUITY_SCORE: 55
ADLS_ACUITY_SCORE: 55
ADLS_ACUITY_SCORE: 59
ADLS_ACUITY_SCORE: 59
ADLS_ACUITY_SCORE: 35
ADLS_ACUITY_SCORE: 55
ADLS_ACUITY_SCORE: 51

## 2024-01-01 ASSESSMENT — COPD QUESTIONNAIRES: COPD: 1

## 2024-01-01 ASSESSMENT — NEW YORK HEART ASSOCIATION (NYHA) CLASSIFICATION: NYHA FUNCTIONAL CLASS: IV

## 2024-01-01 ASSESSMENT — LIFESTYLE VARIABLES: TOBACCO_USE: 1

## 2024-02-22 NOTE — TELEPHONE ENCOUNTER
Patient call:     Appointment type: New Endocrine   Provider: Ty   Return date: 2/22   Speciality phone number: [phone]    Additional appointment(s) needed: N/A   Additional notes: Spoke to pt and agreed to schedule a virtual. Appt opened up with Ty at 11:30 today, scheduled pt in that slot.    Hyperthyroidism. T4- toxicosis    CCs notified to schedule Urgent On Call or AUBREY within 1 week per protocol.   Latosha Hoyos RN on 2/22/24 at 9:01 AM    Nikki Skinner on 2/22/2024 at 9:39 AM

## 2024-02-22 NOTE — CONFIDENTIAL NOTE
RECORDS RECEIVED FROM: internal /ce   DATE RECEIVED: 2.22.24    NOTES (FOR ALL VISITS) STATUS DETAILS   OFFICE NOTES from referring provider mery Randhawa --  Derrick Calloway MD      OFFICE NOTES from other specialist Received  2.21.24    MEDICATION LIST internal     IMAGING      CT (HEAD/NECK/CHEST/ABDOMEN) internal  1.22.24   LABS     DIABETES: HBGA1C, CREATININE, FASTING LIPIDS, MICROALBUMIN URINE, POTASSIUM, TSH, T4    THYROID: TSH, T4, CBC, THYRODLONULIN, TOTAL T3, FREE T4, CALCITONIN, CEA internal /ce CREATININE- 1.15.24  Cmp- 1.15.24  Cbc- 1.15.24  TSH- 1.15.24  T4- 1.15.24  Vitamin D- 12.7.23  Bmp- 12.7.23  Parathyroid horm- 10.5.23   Lipid- 9.29.23

## 2024-02-22 NOTE — LETTER
2/22/2024       RE: Elie Prado  [address]  [facility] [address]     Dear Colleague,    Thank you for referring your patient, Elie Prado, to the [facility] ENDOCRINOLOGY [facility]INIC [place] at [facility]. Please see a copy of my visit note below.    Endocrinology Clinic Visit 2/22/2024      Video-Visit Details    Type of service:  Video Visit    Joined the call at 2/22/2024, 11:36:27 am.  Left the call at 2/22/2024, 12:05:03 pm.    Originating Location (pt. Location): Home        Distant Location (provider location):  Off-site    Mode of Communication:  Video Conference via [employer]    Physician has received verbal consent for a Video Visit from the patient? Yes    I spent a total of 60 minutes on the date of encounter reviewing medical records, evaluating the patient, coordinating care and documenting in the EHR, as detailed above.      NAME:  Elie Prado  PCP:  Fermin Whitney  MRN:  [id number]  Reason for Consult:  hyperthyroidism  Requesting Provider:  Derrick Calloway    Chief Complaint     Chief Complaint   Patient presents with    Consult       History of Present Illness     Elie Prado is a 63 year old male who is seen in video visit for hyperthyroidism. Referred by Dr. Calloway his nephrologist.    Patient has been struggling with wt loss, he recently re-established with nephrology at [facility], work up was significant for labs 1/2024 TSH undetectable, free t4 2.7, total t3 was 480.    I reviewed nephrology note, in summary he has a PMH significant for chronic kidney disease stage IV secondary to obstructive uropathy since 2013. He lost follow up with nephrology for 4-5 years until recently when he was referred by his PCP for weight loss.    He reported unintentional wt loss of 65 lbs over the past year despite eating so much. He denied any heat intolerance. No change in bowel habits, no diarrhea. No palpitation, no chest pain, no tremor. He  has SOB on exertion.  No eye symptoms.      No recent illness. No anterior neck pain.  No recent IV contrast.  He reported difficulty swallowing dry food. No SOB lying down.    Never been on lithium. Never been on amiodarone.      Family hx: no thyroid disease.     Social: he worked in construction. she is smoker, trying to quit. He stopped drinking alcohol 20 years ago. No ilicit drugs or recreational drugs.    Problem List     CKD stage IV  Hypertension    Medications     Current Outpatient Medications   Medication    amLODIPine (NORVASC) 10 MG tablet    sodium bicarbonate 650 MG tablet    lisinopril (PRINIVIL,ZESTRIL) 20 MG tablet    metoprolol succinate (TOPROL-XL) 50 MG 24 hr tablet     No current facility-administered medications for this visit.        Allergies     No Known Allergies    Medical / Surgical History       Past Surgical History:   Procedure Laterality Date    COLONOSCOPY      OTHER SURGICAL HISTORY  02/13/2018    Cysto Dilate Stricture    OTHER SURGICAL HISTORY  12/2003    DIsc repair    OTHER SURGICAL HISTORY  02/13/2018    trauma urethral stricture    OTHER SURGICAL HISTORY  02/02/2018    BPH w/LUTS    OTHER SURGICAL HISTORY  2013    Incomplete Bladder Emptying    OTHER SURGICAL HISTORY  2012    Elevated PSA    TRANSRECTAL ULTRASONIC, TRANSURETHRAL RESECTION (TUR) OF PROSTATE CYST  2013       Social History     Social History     Socioeconomic History    Marital status:      Spouse name: Not on file    Number of children: Not on file    Years of education: Not on file    Highest education level: Not on file   Occupational History    Not on file   Tobacco Use    Smoking status: Former     Types: Cigarettes     Quit date: 2/20/2024    Smokeless tobacco: Never   Substance and Sexual Activity    Alcohol use: Yes     Comment: Alcoholic Drinks/day: light drinker    Drug use: Never    Sexual activity: Not on file   Other Topics Concern    Not on file   Social History Narrative    Not on file  "    Social Determinants of Health     Financial Resource Strain: Not on file   Food Insecurity: Not on file   Transportation Needs: Not on file   Physical Activity: Not on file   Stress: Not on file   Social Connections: Not on file   Interpersonal Safety: Not on file   Housing Stability: Not on file       Family History     Family History   Problem Relation Age of Onset    Arthritis Mother     Asthma Sister     Heart Disease Brother        ROS     12 ROS completed, pertinent positive and negative in HPI    Physical Exam   There were no vitals taken for this visit.   GENERAL: alert and no distress  EYES: Eyes grossly normal to inspection.  No discharge or erythema, or obvious scleral/conjunctival abnormalities.  RESP: No audible wheeze, cough, or visible cyanosis.    SKIN: Visible skin clear. No significant rash, abnormal pigmentation or lesions.  NEURO: Cranial nerves grossly intact.  Mentation and speech appropriate for age.  PSYCH: Appropriate affect, tone, and pace of words     Labs/Imaging     Pertinent Labs were reviewed and updated in EPIC and discussed briefly.  Radiology Results were  reviewed and updated in EPIC and discussed briefly.    Summary of recent findings:   No results found for: \"A1C\"    No results found for: \"TSH\", \"T4\"    Creatinine   Date Value Ref Range Status   12/07/2023 2.71 (H) 0.67 - 1.17 mg/dL Final       Recent Labs   Lab Test 09/29/23  0955 04/13/23  0855   CHOL 130 200*   HDL 64 48   LDL 56 127*   TRIG 50 124       No results found for: \"[id number]\", \"[id number]\", \"[id number]\"    I personally reviewed the patient's outside records from [facility] EMR and Care Everywhere. Summary of pertinent findings in [facility] [place].    Impression / Plan     1. Hyperthyroidism  Based on the lab profile, the patient has hyperthyroidism, as evidenced by elevated thyroid hormone levels. Since TSH is low, this points to the diagnosis of primary hyperthyroidism.     The differential diagnosis of primary hyperthyroidism " includes 3 main etiologies: Graves  disease, Toxic nodular goiter (single or multiple nodules), and Thyroiditis. The most common etiology by far is Graves  disease, which is an auto-immune process that results in activation of the thyroid gland, usually with elevated thyroid stimulating hormone (TSI) and resultant goiter with increased avidity to iodine. This manifests as high and diffuse uptake on radioactive (or I-123) iodine uptake and scan.  People with Graves disease are more likely to have other autoimmune diseases or a family propensity for autoimmune (including, but not restricted to, thyroid disease).  In 8 to 10% of cases the autoimmune process can also affect the eyes (exophtalmos). This can occur several years after hyperthyroidism is diagnosed. The eye involvement is usually mild but in some cases it requires medical or surgical treatment. Unlike the diffuse nature of Graves  disease, a toxic nodule would appear as a focal uptake on the I-123 scan. In thyroiditis, there is an inflammatory destructive process involving the thyroid gland, which could be infectious or auto-immune. It results in leakage of thyroid hormone into the circulation, but a very low uptake on the scan.   Thus in order to establish the etiology, we need the following:  I-123 uptake and scan  TSI level    Today we discussed treatment options for Graves  disease. Or toxic nodule. we discussed radioactive iodine ablation (I-131 ablation) as one option, which is likely to result in permanent hypothyroidism. In a minority of cases, one dose of I-131 is not enough and more doses may be required. Also in a minority, radiation thyroiditis might occur pos-treatment but is usually self-limited. Another approach is anti-thyroid medications which decrease thyroid hormone production, specifically methimazole and PTU. We discussed the potential rare side effects of methimazole, including hepatotoxicity and agranulocytosis, and the need for  frequent lab monitoring, and the need for urgent CBC in case the patient develops a high fever or flu-like illness. A third and less favorable options (last resort) is surgery (i.e. thyroidectomy). Surgery cannot be done unless the hyperthyroidism is controlled because of the risk for thyroid storm. Risks of surgery include, the risks of general anesthesia, bleeding, infection, blood clots, etc. Risks specific to surgery of the thyroid include hoarseness due to recurrent laryngeal nerve injury and damage to the parathyroid glands causing low calcium levels. Most patients decline surgery as it is more invasive.     Patient did not show any preference on therapy, said all I want is to gain my wt back.    Plan:  - TFT and TSI  - Thyroid uptake and scan  - baseline cbc, not checked since 2021    2. CKD stage IV  3. Hypercalcemia  4. Secondary hyperparathyroidism  5. Ckd-MBD  I reviewed his careeverwhere labs, ca 10.6 with normal PTH on 1/2024 previously elevated to 200s-400s, secondary to CKD. Management per primary nephrologist.    5. Elevated liver enzymes  Mild elevation, unclear etiology. Plan to recheck in case MMI is the choice of therapy for hyperthyroidism.    Test and/or medications prescribed today:  No orders of the defined types were placed in this encounter.        Follow up: 4-6 weeks    The longitudinal plan of care for the diagnosis(es)/condition(s) as documented were addressed during this visit. Due to the added complexity in care, I will continue to support Elie in the subsequent management and with ongoing continuity of care.    Doug Crawford MD  Endocrinology, Diabetes and Metabolism  [facility]

## 2024-02-22 NOTE — NURSING NOTE
Is the patient currently in the state of [place]? YES    Visit mode:VIDEO    If the visit is dropped, the patient can be reconnected by: VIDEO VISIT: Text to cell phone:   Telephone Information:   Mobile [phone]       Will anyone else be joining the visit? NO  (If patient encounters technical issues they should call [phone]    How would you like to obtain your AVS? MyChart    Are changes needed to the allergy or medication list? No    Reason for visit: Consult    Bernadette JHAF

## 2024-02-22 NOTE — PROGRESS NOTES
Endocrinology Clinic Visit 2/22/2024      Video-Visit Details    Type of service:  Video Visit    Joined the call at 2/22/2024, 11:36:27 am.  Left the call at 2/22/2024, 12:05:03 pm.    Originating Location (pt. Location): Home        Distant Location (provider location):  Off-site    Mode of Communication:  Video Conference via [employer]    Physician has received verbal consent for a Video Visit from the patient? Yes    I spent a total of 60 minutes on the date of encounter reviewing medical records, evaluating the patient, coordinating care and documenting in the EHR, as detailed above.      NAME:  Elie Prado  PCP:  Fermin Whitney  MRN:  [id number]  Reason for Consult:  hyperthyroidism  Requesting Provider:  Derrick Calloway    Chief Complaint     Chief Complaint   Patient presents with    Consult       History of Present Illness     Elie Prado is a 63 year old male who is seen in video visit for hyperthyroidism. Referred by Dr. Calloway his nephrologist.    Patient has been struggling with wt loss, he recently re-established with nephrology at [facility], work up was significant for labs 1/2024 TSH undetectable, free t4 2.7, total t3 was 480.    I reviewed nephrology note, in summary he has a PMH significant for chronic kidney disease stage IV secondary to obstructive uropathy since 2013. He lost follow up with nephrology for 4-5 years until recently when he was referred by his PCP for weight loss.    He reported unintentional wt loss of 65 lbs over the past year despite eating so much. He denied any heat intolerance. No change in bowel habits, no diarrhea. No palpitation, no chest pain, no tremor. He has SOB on exertion.  No eye symptoms.      No recent illness. No anterior neck pain.  No recent IV contrast.  He reported difficulty swallowing dry food. No SOB lying down.    Never been on lithium. Never been on amiodarone.      Family hx: no thyroid disease.     Social: he worked in construction. she is  smoker, trying to quit. He stopped drinking alcohol 20 years ago. No ilicit drugs or recreational drugs.    Problem List     CKD stage IV  Hypertension    Medications     Current Outpatient Medications   Medication    amLODIPine (NORVASC) 10 MG tablet    sodium bicarbonate 650 MG tablet    lisinopril (PRINIVIL,ZESTRIL) 20 MG tablet    metoprolol succinate (TOPROL-XL) 50 MG 24 hr tablet     No current facility-administered medications for this visit.        Allergies     No Known Allergies    Medical / Surgical History       Past Surgical History:   Procedure Laterality Date    COLONOSCOPY      OTHER SURGICAL HISTORY  02/13/2018    Cysto Dilate Stricture    OTHER SURGICAL HISTORY  12/2003    DIsc repair    OTHER SURGICAL HISTORY  02/13/2018    trauma urethral stricture    OTHER SURGICAL HISTORY  02/02/2018    BPH w/LUTS    OTHER SURGICAL HISTORY  2013    Incomplete Bladder Emptying    OTHER SURGICAL HISTORY  2012    Elevated PSA    TRANSRECTAL ULTRASONIC, TRANSURETHRAL RESECTION (TUR) OF PROSTATE CYST  2013       Social History     Social History     Socioeconomic History    Marital status:      Spouse name: Not on file    Number of children: Not on file    Years of education: Not on file    Highest education level: Not on file   Occupational History    Not on file   Tobacco Use    Smoking status: Former     Types: Cigarettes     Quit date: 2/20/2024    Smokeless tobacco: Never   Substance and Sexual Activity    Alcohol use: Yes     Comment: Alcoholic Drinks/day: light drinker    Drug use: Never    Sexual activity: Not on file   Other Topics Concern    Not on file   Social History Narrative    Not on file     Social Determinants of Health     Financial Resource Strain: Not on file   Food Insecurity: Not on file   Transportation Needs: Not on file   Physical Activity: Not on file   Stress: Not on file   Social Connections: Not on file   Interpersonal Safety: Not on file   Housing Stability: Not on file  "      Family History     Family History   Problem Relation Age of Onset    Arthritis Mother     Asthma Sister     Heart Disease Brother        ROS     12 ROS completed, pertinent positive and negative in HPI    Physical Exam   There were no vitals taken for this visit.   GENERAL: alert and no distress  EYES: Eyes grossly normal to inspection.  No discharge or erythema, or obvious scleral/conjunctival abnormalities.  RESP: No audible wheeze, cough, or visible cyanosis.    SKIN: Visible skin clear. No significant rash, abnormal pigmentation or lesions.  NEURO: Cranial nerves grossly intact.  Mentation and speech appropriate for age.  PSYCH: Appropriate affect, tone, and pace of words     Labs/Imaging     Pertinent Labs were reviewed and updated in EPIC and discussed briefly.  Radiology Results were  reviewed and updated in EPIC and discussed briefly.    Summary of recent findings:   No results found for: \"A1C\"    No results found for: \"TSH\", \"T4\"    Creatinine   Date Value Ref Range Status   12/07/2023 2.71 (H) 0.67 - 1.17 mg/dL Final       Recent Labs   Lab Test 09/29/23  0955 04/13/23  0855   CHOL 130 200*   HDL 64 48   LDL 56 127*   TRIG 50 124       No results found for: \"[id number]\", \"[id number]\", \"[id number]\"    I personally reviewed the patient's outside records from [facility] EMR and Care Everywhere. Summary of pertinent findings in [facility].    Impression / Plan     1. Hyperthyroidism  Based on the lab profile, the patient has hyperthyroidism, as evidenced by elevated thyroid hormone levels. Since TSH is low, this points to the diagnosis of primary hyperthyroidism.     The differential diagnosis of primary hyperthyroidism includes 3 main etiologies: Graves  disease, Toxic nodular goiter (single or multiple nodules), and Thyroiditis. The most common etiology by far is Graves  disease, which is an auto-immune process that results in activation of the thyroid gland, usually with elevated thyroid stimulating hormone (TSI) " and resultant goiter with increased avidity to iodine. This manifests as high and diffuse uptake on radioactive (or I-123) iodine uptake and scan.  People with Graves disease are more likely to have other autoimmune diseases or a family propensity for autoimmune (including, but not restricted to, thyroid disease).  In 8 to 10% of cases the autoimmune process can also affect the eyes (exophtalmos). This can occur several years after hyperthyroidism is diagnosed. The eye involvement is usually mild but in some cases it requires medical or surgical treatment. Unlike the diffuse nature of Graves  disease, a toxic nodule would appear as a focal uptake on the I-123 scan. In thyroiditis, there is an inflammatory destructive process involving the thyroid gland, which could be infectious or auto-immune. It results in leakage of thyroid hormone into the circulation, but a very low uptake on the scan.   Thus in order to establish the etiology, we need the following:  I-123 uptake and scan  TSI level    Today we discussed treatment options for Graves  disease. Or toxic nodule. we discussed radioactive iodine ablation (I-131 ablation) as one option, which is likely to result in permanent hypothyroidism. In a minority of cases, one dose of I-131 is not enough and more doses may be required. Also in a minority, radiation thyroiditis might occur pos-treatment but is usually self-limited. Another approach is anti-thyroid medications which decrease thyroid hormone production, specifically methimazole and PTU. We discussed the potential rare side effects of methimazole, including hepatotoxicity and agranulocytosis, and the need for frequent lab monitoring, and the need for urgent CBC in case the patient develops a high fever or flu-like illness. A third and less favorable options (last resort) is surgery (i.e. thyroidectomy). Surgery cannot be done unless the hyperthyroidism is controlled because of the risk for thyroid storm. Risks  of surgery include, the risks of general anesthesia, bleeding, infection, blood clots, etc. Risks specific to surgery of the thyroid include hoarseness due to recurrent laryngeal nerve injury and damage to the parathyroid glands causing low calcium levels. Most patients decline surgery as it is more invasive.     Patient did not show any preference on therapy, said all I want is to gain my wt back.    Plan:  - TFT and TSI  - Thyroid uptake and scan  - baseline cbc, not checked since 2021    2. CKD stage IV  3. Hypercalcemia  4. Secondary hyperparathyroidism  5. [facility]kd[facility]MBD  [facility] 1/2024 previously elevated to 200s-400s, secondary to CKD. Management per primary nephrologist.    5. Elevated liver enzymes  Mild elevation, unclear etiology. Plan to recheck in case MMI is the choice of therapy for hyperthyroidism.    Test and/or medications prescribed today:  No orders of the defined types were placed in this encounter.        Follow up: 4-6 weeks    The longitudinal plan of care for the diagnosis(es)/condition(s) as documented were addressed during this visit. Due to the added complexity in care, I will continue to support Elie in the subsequent management and with ongoing continuity of care.    Doug Crawford MD  Endocrinology, Diabetes and Metabolism  [facility]

## 2024-03-08 NOTE — H&P
GENERAL PRE-PROCEDURE:   Procedure:  EUS with possible ERPC - dilated CBD  Date/Time:  3/8/2024 9:16 AM    Verbal consent obtained?: Yes    Written consent obtained?: Yes    Risks and benefits: Risks, benefits and alternatives were discussed    Consent given by:  Patient  Patient states understanding of procedure being performed: Yes    Patient's understanding of procedure matches consent: Yes    Procedure consent matches procedure scheduled: Yes    Expected level of sedation:  Deep  Appropriately NPO:  Yes  ASA Class:  3  Mallampati  :  Grade 2- soft palate, base of uvula, tonsillar pillars, and portion of posterior pharyngeal wall visible  Lungs:  Lungs clear with good breath sounds bilaterally  Heart:  Normal heart sounds and rate  History & Physical reviewed:  History and physical reviewed and no updates needed  Statement of review:  I have reviewed the lab findings, diagnostic data, medications, and the plan for sedation

## 2024-03-08 NOTE — ANESTHESIA CARE TRANSFER NOTE
Patient: Elie Prado    Procedure: Procedure(s):  ENDOSCOPIC RETROGRADE CHOLANGIOPANCREATOGRAPHY WITH STONE EXTRACTION  ENDOSCOPIC ULTRASOUND UPPER       Diagnosis: Common bile duct dilatation [K83.8]  Diagnosis Additional Information: No value filed.    Anesthesia Type:   MAC     Note:    Oropharynx: oropharynx clear of all foreign objects and spontaneously breathing  Level of Consciousness: drowsy  Oxygen Supplementation: face mask  Level of Supplemental Oxygen (L/min / FiO2): 6  Independent Airway: airway patency satisfactory and stable  Dentition: dentition unchanged  Vital Signs Stable: post-procedure vital signs reviewed and stable  Report to RN Given: handoff report given  Patient transferred to: Phase II    Handoff Report: Identifed the Patient, Identified the Reponsible Provider, Reviewed the pertinent medical history, Discussed the surgical course, Reviewed Intra-OP anesthesia mangement and issues during anesthesia, Set expectations for post-procedure period and Allowed opportunity for questions and acknowledgement of understanding  Vitals:  Vitals Value Taken Time   /70 03/08/24 1147   Temp 98.6 03/08/24 1147   Pulse 107 03/08/24 1148   Resp 25 03/08/24 1147   SpO2 100 % 03/08/24 1148   Vitals shown include unfiled device data.    Electronically Signed By: PARTH Shell CRNA  March 8, 2024  11:50 AM

## 2024-03-08 NOTE — ANESTHESIA POSTPROCEDURE EVALUATION
Patient: Elie Prado    Procedure: Procedure(s):  ENDOSCOPIC RETROGRADE CHOLANGIOPANCREATOGRAPHY WITH STONE EXTRACTION  ENDOSCOPIC ULTRASOUND UPPER       Anesthesia Type:  MAC    Note:  Disposition: Outpatient   Postop Pain Control: Uneventful            Sign Out: Well controlled pain   PONV: No   Neuro/Psych: Uneventful            Sign Out: Acceptable/Baseline neuro status   Airway/Respiratory: Uneventful            Sign Out: Acceptable/Baseline resp. status   CV/Hemodynamics: Uneventful            Sign Out: Acceptable CV status; No obvious hypovolemia; No obvious fluid overload   Other NRE: NONE   DID A NON-ROUTINE EVENT OCCUR?            Last vitals:  Vitals Value Taken Time   /68 03/08/24 1230   Temp 37  C (98.6  F) 03/08/24 1147   Pulse 113 03/08/24 1245   Resp 17 03/08/24 1230   SpO2 99 % 03/08/24 1245   Vitals shown include unfiled device data.    Electronically Signed By: Neeraj Bowden MD  March 8, 2024  1:00 PM

## 2024-04-17 NOTE — ADDENDUM NOTE
Addendum  created 04/17/24 0907 by Austin Del Castillo APRN CRNA    Intraprocedure Staff edited

## 2024-04-20 PROBLEM — I46.9 CARDIAC ARREST (H): Status: ACTIVE | Noted: 2024-01-01

## 2024-04-20 NOTE — Clinical Note
The first balloon was inserted into the right coronary artery.Max pressure = 12 anson.     Max pressure = 16 anson.    Balloon reinflated a second time: Max pressure = 16 anson.

## 2024-04-20 NOTE — PROGRESS NOTES
ECMO Attending Progress Note  4/20/2024    65 yo M who was cannulated on 4/20/24 in the setting of [facility] VT/VF arrest due to culprit RCA disease s/p PCI    Cannulation Site:  17 Fr in the R femoral artery  25 Fr in the R femoral vein  No DRC was feasible given SFA is occluded    Interval events: admitted to the ICU     Physical Exam:  Temp:  [93  F (33.9  C)-96.6  F (35.9  C)] 96.6  F (35.9  C)  Pulse:  [121-132] 131  Resp:  [12-31] 27  MAP:  [68 mmHg-69 mmHg] 69 mmHg  Arterial Line BP: (73-75)/(65-67) 75/67  FiO2 (%):  [60 %] 60 %  SpO2:  [74 %-100 %] 100 %    Intake/Output Summary (Last 24 hours) at 4/20/2024 1536  Last data filed at 4/20/2024 1500  Gross per 24 hour   Intake 95.72 ml   Output 900 ml   Net -804.28 ml    Vent Mode: CMV/AC  (Continuous Mandatory Ventilation/ Assist Control)  FiO2 (%): 60 %  Resp Rate (Set): 10 breaths/min  Tidal Volume (Set, mL): 450 mL  PEEP (cm H2O): 5 cmH2O  Resp: 27       Labs:  Recent Labs   Lab 04/20/24  1314 04/20/24  1309 04/20/24  1203 04/20/24  1130 04/20/24  1058   PH 7.18*  --  7.16* 7.20*  --    PCO2 50*  --  29* 38  --    PO2 158*  --  162* 219*  --    HCO3 19*  --  10* 15*  --    O2PER 60 60  50 80.0  --  50.0      Recent Labs   Lab 04/20/24  1306 04/20/24  1203 04/20/24  1130 04/20/24  1058   WBC 23.9*  --   --   --    HGB 10.8* 5.8* 6.5* 7.0*     Creatinine   Date Value Ref Range Status   04/20/2024 2.60 (H) 0.67 - 1.17 mg/dL Final              ECMO Issues including assessments and plan on DOS 4/20/2024:  Neuro: Sedated for mechanical ventilation and ECMO- start fentanyl and wean versed as able.  No acute distress.  NIRS stable  b/l  RASS goal: -2-3  CV: Cardiogenic shock.  Hemodynamically stable on vaso at weaning doses  Pulm: Keep vent settings at rest settings as above.  FEN/Renal: hx of CKD Electrolytes stable w/ replacement protocols in place, Cr stable, UOP stable  Heme: ACT goal: 180-200, Hemoglobin seems stable awaiting recheck .  Minimal oozing around the  ECMO cannulas.  ID: Receiving empiric antibiotics  Cannulae: Position is acceptable on exam and the available imaging.  Distal perfusion cannula is in place and patent.  Extremities are well-perfused.     I have personally reviewed the ECMO flows, oxygenation and CO2 clearance, anticoagulation, and cannula position.  I have also personally assessed the patient's systemic response with hemodynamics, oxygenation, ventilation, and bleeding.       The patient requires continued ECMO support and management in the ICU.     Sydni Cantor MD  Cardiology critical care  Pager

## 2024-04-20 NOTE — PHARMACY-VANCOMYCIN DOSING SERVICE
Pharmacy Vancomycin Initial Note  Date of Service 2024  Patient's  1899  89 year old, male    Indication: Aspiration Pneumonia    Current estimated CrCl = Estimated Creatinine Clearance: 4 mL/min (A) (based on SCr of 2.6 mg/dL (H)).    Creatinine for last 3 days  2024:  1:06 PM Creatinine 2.60 mg/dL    Recent Vancomycin Level(s) for last 3 days  No results found for requested labs within last 3 days.      Vancomycin IV Administrations (past 72 hours)        No vancomycin orders with administrations in past 72 hours.                    Nephrotoxins and other renal medications (From now, onward)      Start     Dose/Rate Route Frequency Ordered Stop    24 1600  vancomycin (VANCOCIN) 1,000 mg in 200 mL dextrose intermittent infusion         1,000 mg  200 mL/hr over 1 Hours Intravenous ONCE 24 1544      24 1543  vancomycin place martinez - receiving intermittent dosing         1 each Intravenous SEE ADMIN INSTRUCTIONS 24 1544      24 1500  vasopressin 1 unit/mL MAX Conc (PITRESSIN) infusion         0.5-4 Units/hr  0.5-4 mL/hr  Intravenous CONTINUOUS 24 1431      24 1230  norepinephrine (LEVOPHED) 16 mg in  mL infusion MAX CONC CENTRAL LINE         0.01-0.6 mcg/kg/min × 46.8 kg (Dosing Weight)  0.4-26.3 mL/hr  Intravenous CONTINUOUS 24 1217              Contrast Orders - past 72 hours (72h ago, onward)      Start     Dose/Rate Route Frequency Stop    24 1209  iopamidol (ISOVUE-370) solution  Status:  Discontinued           ONCE PRN 24 1313             Plan:  Start vancomycin 1000 mg x1, followed by intermittent dosing based on levels  Vancomycin monitoring method: Trough (Method 2 = manual dose calculation)  Vancomycin therapeutic monitoring goal: 15-20 mg/L  Pharmacy will check vancomycin levels as appropriate in 1-3 Days.    Serum creatinine levels will be ordered daily for the first week of therapy and at least twice weekly for  subsequent weeks.      Ghazala Cordova, RPH

## 2024-04-20 NOTE — CONSULTS
Neurocritical Care Consultation    Reason for critical care admission: Cardiac Arrest  Admitting Team: CICU  Date of Service:  04/20/2024  Date of Admission:  4/20/2024  Hospital Day: 1    Assessment/Plan  Derrick Covarrubias is a 64 year old male with history of CKD and opiate dependence who presented to [facility] on 4/20/2024 for [facility] ECMO cannulation for refractory VF cardiac arrest. Coronary angiography at [facility] revealed culprit RCA lesion.     NCC was consulted on 4/29/2024 for post-cardiac arrest neuro-prognostication.     Neuro  #Concern for post-arrest cerebral edema  #Concern for hypoxic encephalopathy 2.2 cardiac arrest  Post arrest day: 0  Length of downtime: 2 minutes  -Witnessed arrest: No  -ROSC: No, rhythm: VF  -Goal normothermia   -Current temp: 35.0    Analgesics & sedation  Prior sedation: None  Current sedation: Midazolam     Exam findings   -Cough: Deferred.  -Gag: Deferred.  -Pupils: NPI 4.0 right, 4.6 left, size 2 mm bilaterally     Neuroimaging  -Day 1 CTH shows: No acute intracranial pathology, old lacunar infarct in the left basal ganglia, white matter changes of likely chronic small vessel ischemic disease   -Consider repeating CTH on day 3     Neurophysiology  -Start vEEG with VA ECMO  -vEEG shows: pending     Biomarkers  -Neuron-specific Enolase (NSE) results: pending   -S 100B protein: pending     Recommendations  -Avoid hypotonic solutions as they may worsen cerebral edema   -Avoid nitroprusside or nitroglycerin as they may also worsen cerbral edema  -Advise slow correction of hypernatremia if needed  -When safe to do so, please limit use of CNS acting medications such as benzodiazepines, opiates, anticholinergics, which will permit a more accurate neurological assessment    NCC will continue to follow, please call *[phone] with any questions.    TIME SPENT ON THIS ENCOUNTER   I spent 50 minutes of critical care time on the unit/floor managing the care of Derrick Covarrubias.  Upon evaluation, this patient had a high probability of imminent or life-threatening deterioration due to concern for post-cardiac arrest encephalopathy, which required my direct attention, intervention, and personal management . Greater than 50% of my time was spent at the bedside counseling the patient and/or coordinating care including chart review, history, exam, documentation, and further activities per this note. I have personally reviewed the following data/imaging over the past 24 hours.     The patient was seen and discussed with the NCC attending, Dr. Cormier.    PARTH Tatum, CNP  Neurocritical Care *[id number]    Not on File    Current Medications:  Current Facility-Administered Medications   Medication Dose Route Frequency Provider Last Rate Last Admin    artificial tears ophthalmic ointment   Both Eyes Q8H Fela Burris APRN CNP        [START ON 4/21/2024] aspirin (ASA) chewable tablet 81 mg  81 mg Per Feeding Tube Daily Fela Burris APRN CNP        cefTRIAXone (ROCEPHIN) 2 g vial to attach to  ml bag for ADULTS or NS 50 ml bag for PEDS  2 g Intravenous Q24H Fela Burris APRN CNP        pantoprazole (PROTONIX) IV push injection 40 mg  40 mg Intravenous Daily Fela Burris APRN CNP        [START ON 4/21/2024] ticagrelor (BRILINTA) tablet 90 mg  90 mg Oral BID Fela Burris APRN CNP           PRN Medications:  Current Facility-Administered Medications   Medication Dose Route Frequency Provider Last Rate Last Admin    aspirin (ASA) tablet    Once PRN Kiel Chandler MD   325 mg at 04/20/24 1118    calcium chloride 1 g in sodium chloride 0.9 % 100 mL intermittent infusion  1 g Intravenous Q6H PRN Fela Burris APRN CNP        EPINEPHrine (ADRENALIN) 5 mg in sodium chloride 0.9 % 250 mL infusion CENTRAL    Continuous PRN Kiel Chandler MD   Stopped at 04/20/24 1152    fentaNYL (PF) (SUBLIMAZE) injection    Once PRN Kiel Chandler MD   200 mcg at 04/20/24 1154     fentaNYL (SUBLIMAZE) 50 mcg/mL bolus from pump  50 mcg Intravenous Q30 Min PRN Fela Burris APRN CNP        heparin (porcine) injection    Once PRN Kiel Chandler MD   10,000 Units at 04/20/24 1043    heparin ANTICOAGULANT bolus dose from infusion pump 5-10 Units/kg (Ideal)  5-10 Units/kg (Ideal) Other Q30 Min PRN Fela Burris APRN CNP        iopamidol (ISOVUE-370) solution    Once PRN Kiel Chandler MD   179 mL at 04/20/24 1209    lidocaine (LMX4) cream   Topical Q1H PRN Fela Burris APRN CNP        lidocaine 1 % 0.1-1 mL  0.1-1 mL Other Q1H PRN Fela Burris APRN CNP        magnesium sulfate 2 g in 50 mL sterile water intermittent infusion  2 g Intravenous Daily PRN Fela Burris APRN CNP        magnesium sulfate 4 g in 100 mL sterile water intermittent infusion  4 g Intravenous Q4H PRN Fela Burris APRN CNP        midazolam (VERSED) 1 mg/mL bolus from syringe/bag pump ADULT    Once PRN Kiel Chandler MD   4 mg at 04/20/24 1227    midazolam (VERSED) 100 mg/100 mL NS infusion - ADULT    Continuous PRN Kiel Chandler MD 10 mL/hr at 04/20/24 1152 Rate Change at 04/20/24 1152    midazolam (VERSED) injection    Once PRN Kiel Chandler MD   4 mg at 04/20/24 1103    nitroGLYcerin in D5W injection    Once PRN Kiel Chandler MD   400 mcg at 04/20/24 1114    norepinephrine (LEVOPHED) 16 mg in  mL infusion MAX CONC CENTRAL LINE    Continuous PRN Kiel Chandler MD   Stopped at 04/20/24 1153    potassium chloride 20 mEq in 50 mL intermittent infusion  20 mEq Intravenous Q1H PRN Fela Burris APRN CNP        sodium bicarbonate 8.4 % injection    Once PRN Kiel Chandler MD   200 mEq at 04/20/24 1050    sodium chloride (PF) 0.9% PF flush 3 mL  3 mL Intracatheter Q8H PRN Fela Burris APRN CNP        sodium chloride (PF) 0.9% PF flush 3 mL  3 mL Intracatheter q1 min prn Fela Burris APRN CNP        sodium chloride 0.9% BOLUS    Continuous PRN Kiel Chandler,  MD   1,000 mL at 04/20/24 1116    sodium phosphate 10 mmol in sodium chloride 0.9 % 250 mL intermittent infusion  10 mmol Intravenous Daily PRN Fela Burris APRN CNP        sodium phosphate 15 mmol in sodium chloride 0.9 % 250 mL intermittent infusion  15 mmol Intravenous Daily PRN Fela Burris APRN CNP        sodium phosphate 20 mmol in sodium chloride 0.9 % 250 mL intermittent infusion  20 mmol Intravenous Q6H PRN Fela Burris APRN CNP        sodium phosphate 25 mmol in sodium chloride 0.9 % 500 mL intermittent infusion  25 mmol Intravenous Q8H PRN Fela Burris APRN CNP        ticagrelor (BRILINTA) tablet    Once PRN Kiel Chandler MD   180 mg at 04/20/24 1117    vasopressin (VASOSTRICT) 20 Units in D5W 100 mL standard conc infusion    Continuous PRN Kiel Chandler MD 20 mL/hr at 04/20/24 1045 4 Units/hr at 04/20/24 1045       Infusions:  Current Facility-Administered Medications   Medication Dose Route Frequency Provider Last Rate Last Admin    EPINEPHrine (ADRENALIN) 5 mg in sodium chloride 0.9 % 250 mL infusion CENTRAL    Continuous PRN Kiel Chandler MD   Stopped at 04/20/24 1152    EPINEPHrine (ADRENALIN) 5 mg in sodium chloride 0.9 % 250 mL infusion CENTRAL  0.03-0.3 mcg/kg/min (Dosing Weight) Intravenous Continuous Fela Burris APRN CNP        fentaNYL (SUBLIMAZE) infusion   mcg/hr Intravenous Continuous Fela Burris APRN CNP        heparin (porcine) 100 unit/mL in 0.45% Sodium Chloride ANTICOAGULANT infusion  10-50 Units/kg/hr (Ideal) Other Continuous Fela Burris APRN CNP        heparin 2 unit/mL in 0.9% NaCl (for REPERFUSION CATHETER)  3 mL/hr Intravenous Continuous Fela Burris APRN CNP        midazolam (VERSED) 100 mg/100 mL NS infusion - ADULT    Continuous PRN Kiel Chandler MD 10 mL/hr at 04/20/24 1152 Rate Change at 04/20/24 1152    norepinephrine (LEVOPHED) 16 mg in  mL infusion MAX CONC CENTRAL LINE    Continuous PRN Geraldine  Kiel PIERCE MD   Stopped at 04/20/24 1153    norepinephrine (LEVOPHED) 16 mg in  mL infusion MAX CONC CENTRAL LINE  0.01-0.6 mcg/kg/min (Dosing Weight) Intravenous Continuous Fela Burris APRN CNP        sodium chloride 0.9% BOLUS    Continuous PRN Kiel Chandler MD   1,000 mL at 04/20/24 1116    vasopressin (VASOSTRICT) 20 Units in D5W 100 mL standard conc infusion    Continuous PRN Kiel Chandler MD 20 mL/hr at 04/20/24 1045 4 Units/hr at 04/20/24 1045    vasopressin (VASOSTRICT) 20 Units in sodium chloride 0.9 % 100 mL standard conc infusion  0.5-4 Units/hr Intravenous Continuous Fela Burris APRN CNP           Physical Examination:  Vitals: Resp 12   General: Adult male patient, lying in bed, critically-ill.  HEENT: Normocephalic, atraumatic.  Cardiac: He appears adequately perfused.   Pulm: Synchronous with mechanical ventilator.   Abdomen: Non-distended.   Extremities: Warm, distal extremities appear adequately perfused.   Skin: No no obvious rashes or lesions on exposed skin.   Psych: Sedated.  Neuro:  Mental status: Exam is limited by ETT and sedation.   Cranial nerves: NPI > 4.0 bilaterally, 2 mm bilateral pupils. Face appears symmetric though exam is limited by ETT and sedation.   Motor: Normal bulk and tone. No abnormal movements.   Sensory: Deferred.  Coordination: Deferred.  Gait: Deferred.    Labs and Imaging:    Results for orders placed or performed during the hospital encounter of 04/20/24 (from the past 24 hour(s))   Hemoglobin POCT   Result Value Ref Range    Hemoglobin POCT 7.0 (L) 13.3 - 17.7 g/dL   Venous Panel POCT   Result Value Ref Range    pH Venous POCT 7.02 (LL) 7.32 - 7.43    pCO2 Venous POCT 70 (H) 40 - 50 mm Hg    pO2 Venous POCT 60 (H) 25 - 47 mm Hg    Bicarbonate Venous POCT 18 (L) 21 - 28 mmol/L    Sodium POCT 146 (H) 135 - 145 mmol/L    Potassium POCT 3.5 3.4 - 5.3 mmol/L    Hemoglobin POCT 7.0 (L) 13.3 - 17.7 g/dL    Oxyhemoglobin Venous POCT 79 (H) 70 - 75 %     Glucose Whole Blood POCT 112 (H) 70 - 99 mg/dL    Base Excess/Deficit (+/-) POCT -12.1 (L) -3.0 - 3.0 mmol/L    Calcium, Ionized Whole Blood POCT 5.3 (H) 4.4 - 5.2 mg/dL    Lactic Acid POCT 13.2 (HH) 0.7 - 2.0 mmol/L    FIO2 POCT 50.0 %    Narrative    In healthy individuals, oxyhemoglobin (O2Hb) and oxygen saturation (SO2) are approximately equal. In the presence of dyshemoglobins, oxyhemoglobin can be considerably lower than oxygen saturation.   Activated clotting time celite, POCT   Result Value Ref Range    Activated Clotting Time (Celite) POCT 356 (H) 74 - 150 seconds   Arterial Panel POCT   Result Value Ref Range    pH Arterial POCT 7.20 (L) 7.35 - 7.45    pCO2 Arterial POCT 38 35 - 45 mm Hg    pO2 Arterial POCT 219 (H) 80 - 105 mm Hg    Bicarbonate Arterial POCT 15 (L) 21 - 28 mmol/L    Sodium POCT 145 135 - 145 mmol/L    Potassium POCT 3.4 3.4 - 5.3 mmol/L    Hemoglobin POCT 6.5 (LL) 13.3 - 17.7 g/dL    Glucose Whole Blood POCT 75 70 - 99 mg/dL    Calcium, Ionized Whole Blood POCT 5.0 4.4 - 5.2 mg/dL    Base Excess/Deficit (+/-) POCT -12.2 (L) -3.0 - 3.0 mmol/L    Lactic Acid POCT 12.8 (HH) 0.7 - 2.0 mmol/L    Oxyhemoglobin POCT 97 92 - 100 %    Narrative    In healthy individuals, oxyhemoglobin (O2Hb) and oxygen saturation (SO2) are approximately equal. In the presence of dyshemoglobins, oxyhemoglobin can be considerably lower than oxygen saturation.   Prepare red blood cells (unit)   Result Value Ref Range    ISSUE DATE AND TIME 20240420113500     Blood Component Type Red Blood Cells     Product Code [id number]     Unit Status Issued     Unit Number [id number]     UNIT ABO/RH O-     CODING SYSTEM [id number]     UNIT TYPE ISBT 9500    Prepare red blood cells (unit)   Result Value Ref Range    ISSUE DATE AND TIME [id number]     Blood Component Type Red Blood Cells     Product Code [id number]     Unit Status Issued     Unit Number [id number]     UNIT ABO/RH O-     CODING SYSTEM [id number]     UNIT TYPE ISBT  9500    Arterial Panel POCT   Result Value Ref Range    pH Arterial POCT 7.16 (LL) 7.35 - 7.45    pCO2 Arterial POCT 29 (L) 35 - 45 mm Hg    pO2 Arterial POCT 162 (H) 80 - 105 mm Hg    Bicarbonate Arterial POCT 10 (LL) 21 - 28 mmol/L    Sodium POCT 146 (H) 135 - 145 mmol/L    Potassium POCT 2.6 (LL) 3.4 - 5.3 mmol/L    Hemoglobin POCT 5.8 (LL) 13.3 - 17.7 g/dL    Glucose Whole Blood POCT 156 (H) 70 - 99 mg/dL    Calcium, Ionized Whole Blood POCT 3.9 (L) 4.4 - 5.2 mg/dL    Base Excess/Deficit (+/-) POCT -16.8 (L) -3.0 - 3.0 mmol/L    FIO2 POCT 80.0 %    Lactic Acid POCT 7.6 (HH) 0.7 - 2.0 mmol/L    Oxyhemoglobin POCT 97 92 - 100 %    Narrative    In healthy individuals, oxyhemoglobin (O2Hb) and oxygen saturation (SO2) are approximately equal. In the presence of dyshemoglobins, oxyhemoglobin can be considerably lower than oxygen saturation.   Asymptomatic COVID-19 Virus (Coronavirus) by PCR Nasopharyngeal    Specimen: Nasopharyngeal; Swab   Result Value Ref Range    SARS CoV2 PCR Negative Negative    Narrative    Testing was performed using the Xpert Xpress SARS-CoV-2 Assay on the Cepheid Gene-Xpert Instrument Systems. Additional information about this Emergency Use Authorization (EUA) assay can be found via the Lab Guide. This test should be ordered for the detection of SARS-CoV-2 in individuals who meet SARS-CoV-2 clinical and/or epidemiological criteria as well as from individuals without symptoms or other reasons to suspect COVID-19. Test performance for asymptomatic patients has only been established in anterior nasal swab specimens. This test is for in vitro diagnostic use under the FDA EUA for laboratories certified under CLIA to perform high complexity testing. This test has not been FDA cleared or approved. A negative result does not rule out the presence of PCR inhibitors in the specimen or target RNA concentration below the limit of detection for the assay. The possibility of a false negative should be  considered if the patient's recent exposure or clinical presentation suggests COVID-19. This test was validated by [facility]. These Laboratories are certified under the Clinical Laboratory Improvement Amendments (CLIA) as qualified to perform high complexity testing.     ABO/Rh type and screen    Narrative    The following orders were created for panel order ABO/Rh type and screen.  Procedure                               Abnormality         Status                     ---------                               -----------         ------                     Adult Type and Screen[[id number]]                            Preliminary result           Please view results for these tests on the individual orders.   CBC with platelets differential    Narrative    The following orders were created for panel order CBC with platelets differential.  Procedure                               Abnormality         Status                     ---------                               -----------         ------                     CBC with platelets and d...[[id number]]  Abnormal            Final result                 Please view results for these tests on the individual orders.   Erythrocyte sedimentation rate auto   Result Value Ref Range    Erythrocyte Sedimentation Rate 21 (H) 0 - 20 mm/hr   Hemoglobin plasma   Result Value Ref Range    Hemoglobin Plasma 50 (H) <30 mg/dL   Adult Type and Screen   Result Value Ref Range    SPECIMEN EXPIRATION DATE [id number]    CBC with platelets and differential   Result Value Ref Range    WBC Count 23.9 (H) 4.0 - 11.0 10e3/uL    RBC Count 3.85 (L) 4.40 - 5.90 10e6/uL    Hemoglobin 10.8 (L) 13.3 - 17.7 g/dL    Hematocrit 33.0 (L) 40.0 - 53.0 %    MCV 86 78 - 100 fL    MCH 28.1 26.5 - 33.0 pg    MCHC 32.7 31.5 - 36.5 g/dL    RDW 15.3 (H) 10.0 - 15.0 %    Platelet Count 242 150 - 450 10e3/uL    % Neutrophils 89 %    % Lymphocytes 6 %    % Monocytes 4 %    % Eosinophils 0 %    %  Basophils 0 %    % Immature Granulocytes 1 %    NRBCs per 100 WBC 0 <1 /100    Absolute Neutrophils 21.3 (H) 1.6 - 8.3 10e3/uL    Absolute Lymphocytes 1.4 0.8 - 5.3 10e3/uL    Absolute Monocytes 0.8 0.0 - 1.3 10e3/uL    Absolute Eosinophils 0.0 0.0 - 0.7 10e3/uL    Absolute Basophils 0.0 0.0 - 0.2 10e3/uL    Absolute Immature Granulocytes 0.3 <=0.4 10e3/uL    Absolute NRBCs 0.0 10e3/uL   CT Head w/o Contrast    Narrative    EXAM: CT HEAD W/O CONTRAST  4/20/2024 1:07 PM     HISTORY:  out of hospital cardiac arrest initial scan looking for any  pathology       COMPARISON:  None.    TECHNIQUE: Using multidetector thin collimation helical acquisition  technique, axial, coronal and sagittal CT images from the skull base  to the vertex were obtained without intravenous contrast.   (topogram) image(s) also obtained and reviewed.    FINDINGS:  Retained contrast from recent catheterization.    Old lacunar infarct in left basal ganglia. Periventricular and  subcortical white matter hypoattenuation, nonspecific, but likely  represents chronic small vessel ischemic disease.    No acute intracranial hemorrhage, mass effect, or midline shift. No  acute loss of gray-white matter differentiation in the cerebral  hemispheres. The ventricles are proportionate to the cerebral sulci.  Clear basal cisterns.    The bony calvaria in the bones of the skull base are normal. The  visualized portions of the paranasal sinuses and mastoid air cells are  clear. Orbits are unremarkable.       Impression    IMPRESSION:   1. No acute intracranial pathology.  2. Old lacunar infarct in the left basal ganglia. White matter changes  of likely chronic small vessel ischemic disease.    I have personally reviewed the examination and initial interpretation  and I agree with the findings.    SP BUCHANAN MD         SYSTEM ID:  [id number]   Blood gas ELS arterial   Result Value Ref Range    pH ELS Arterial 7.08 (LL) 7.35 - 7.45    pCO2 ELS Arterial 69  (H) 35 - 45 mm Hg    pO2 ELS Arterial 157 (H) 80 - 105 mm Hg    Bicarbonate ELS Arterial 20 (L) 21 - 28 mmol/L    Base Excess/Deficit Arterial -10.4 (L) -3.0 - 3.0 mmol/L    FIO2 50     Oxyhemoglobin ELS Arterial 98 75 - 100 %    O2 Saturation ELS Arterial 99.6 (H) 95.0 - 96.0 %    Lactic Acid 7.6 (HH) 0.7 - 2.0 mmol/L    Narrative    In healthy individuals, oxyhemoglobin (O2Hb) and oxygen saturation (SO2) are approximately equal. In the presence of dyshemoglobins, oxyhemoglobin can be considerably lower than oxygen saturation.   Blood gas ELS venous   Result Value Ref Range    pH ELS Venous 7.09 (LL) 7.32 - 7.43    pCO2 ELS Venous 70 (H) 40 - 50 mm Hg    pO2 ELS Venous 52 (H) 25 - 47 mm Hg    Bicarbonate ELS Venous 21 21 - 28 mmol/L    Base Excess/Deficit Venous -9.3 (L) -3.0 - 3.0 mmol/L    FIO2 60     Oxyhemoglobin ELS Venous 77 %    O2 Saturation ELS Venous 78.8 (H) 70.0 - 75.0 %    Lactic Acid 7.4 (HH) 0.7 - 2.0 mmol/L    Narrative    In healthy individuals, oxyhemoglobin (O2Hb) and oxygen saturation (SO2) are approximately equal. In the presence of dyshemoglobins, oxyhemoglobin can be considerably lower than oxygen saturation.   Blood gas arterial   Result Value Ref Range    pH Arterial 7.18 (LL) 7.35 - 7.45    pCO2 Arterial 50 (H) 35 - 45 mm Hg    pO2 Arterial 158 (H) 80 - 105 mm Hg    FIO2 60     Bicarbonate Arterial 19 (L) 21 - 28 mmol/L    Base Excess/Deficit Arterial -9.7 (L) -3.0 - 3.0 mmol/L    Gm's Test Artline     Oxyhemoglobin Arterial 98 92 - 100 %    O2 Sat, Arterial 99.8 (H) 95.0 - 96.0 %    Lactic Acid 7.8 (HH) 0.7 - 2.0 mmol/L    Narrative    In healthy individuals, oxyhemoglobin (O2Hb) and oxygen saturation (SO2) are approximately equal. In the presence of dyshemoglobins, oxyhemoglobin can be considerably lower than oxygen saturation.   Calcium ionized whole blood   Result Value Ref Range    Calcium Ionized Whole Blood 5.0 4.4 - 5.2 mg/dL    Lactic Acid 7.8 (HH) 0.7 - 2.0 mmol/L   Lactic  acid whole blood   Result Value Ref Range    Lactic Acid 7.8 (HH) 0.7 - 2.0 mmol/L   Activated clotting time celite, POCT   Result Value Ref Range    Activated Clotting Time (Celite) POCT 267 (H) 74 - 150 seconds   EKG 12-lead, tracing only   Result Value Ref Range    Systolic Blood Pressure  mmHg    Diastolic Blood Pressure  mmHg    Ventricular Rate 131 BPM    Atrial Rate 131 BPM    CA Interval 128 ms    QRS Duration 98 ms     ms    QTc 507 ms    P Axis 85 degrees    R AXIS 94 degrees    T Axis 245 degrees    Interpretation ECG       Sinus tachycardia  Rightward axis  Pulmonary disease pattern  Inferior infarct , age undetermined  Abnormal ECG  No previous ECGs available         All relevant imaging and laboratory values personally reviewed.

## 2024-04-20 NOTE — H&P
Critical Care Cardiology: History and Physical  Derrick Oharamarisol MRN: [id number]  Age: 89 year old, : 1899  Date: 2024    History of Present Illness     Derrick Covarrubias is a 64 year old male being admitted to the CICU on s/p refractory VF cardiac arrest requiring V-A ECMO. The patient has a PMHx of Chronic renal disease and percocet dependence. Brought to the CCL and angiogram revealed RCA culprit lesion     Shopping with brother in law, he wasn't feeling right and went to his car. His brother in law went to check on him about 2 minutes later and found him unresponsive. He called 911 at 0953, pulled him out of the car and began CPR. Medics arrived at 0957 and found him to be in VF. He was shocked a total of 6 times, given 300 amiodarone, 3 amps of EPI, and devolved to PEA. An ETT was placed at the scene and he was taken to [facility]. Total CPR time 40 minutes. Arrived at [facility] CCL 1037 with MARGUERITE going. On pump at 1044. Coronary angiogram completed showing pulsatility and culprit lesion in RCA, stent placed    Witnessed arest : N  Home or public location: public  Bystander CPR ( Yes  Time to compressions: 2 minutes  Time to cannulation: 40 minutes  Initial rhythm: VF  Did they have intermittent ROSC : N  # of shocks 6  Epi:  3 mg  Amio:  300 mg    Medications   I have reviewed this patient's current medications    No past medical history on file.    No family history on file.  Social History     Socioeconomic History    Marital status: Not on file     Spouse name: Not on file    Number of children: Not on file    Years of education: Not on file    Highest education level: Not on file   Occupational History    Not on file   Tobacco Use    Smoking status: Not on file    Smokeless tobacco: Not on file   Substance and Sexual Activity    Alcohol use: Not on file    Drug use: Not on file    Sexual activity: Not on file   Other Topics Concern    Not on file   Social History Narrative     "Not on file     Social Determinants of Health     Financial Resource Strain: Not on file   Food Insecurity: Not on file   Transportation Needs: Not on file   Physical Activity: Not on file   Stress: Not on file   Social Connections: Not on file   Interpersonal Safety: Not on file   Housing Stability: Not on file       Review of Systems     ROS as mentioned in HPI. Detailed HPI could not be obtained as patient intubated and sedated.        Physical Exam     @Vitals: Pulse (!) 136   Temp 97.3  F (36.3  C)   Resp 18   Ht 1.702 m (5' 7\")   Wt 46.8 kg (103 lb 2.8 oz)   SpO2 92%   BMI 16.16 kg/m      BMI= Body mass index is 16.16 kg/m .   GENERAL APPEARANCE: Intubated, sedated. NAD.  CARDIOVASCULAR: Regular rate and rhythm, normal S1/S2 no m/r/g. DP Pulses dopplerable.  RESP: Coarse bilaterally. Mechanical ventilation.    GASTRO: Soft, bowel sounds hypoactive but present.  GENITOURINARY: Russo in place.  EXTREMITIES: Cool, +1 edema, DP pulses dopplered. VA ECMO cannulas and distal reperfusion catheter in right groin, IABP in left groin.   NEURO: Sedated and intubated, pupils equal and reactive. Fentanyl and Versed for sedation.  INTEGUMENTARY: No rashes. Cannula/Line sites CDI  LINES/TUBES/DRAINS: (noted below) V-A ECMO Cannulas R groin, IABP in L groin. R radial Arterial line. ETT. OG. Russo Catheter.  EKG:      Assessment and Plan     Neurology   # Concern for anoxic brain injury    - Intubated, sedated  - Warm 0.5 degrees C per hour until 37.0. Avoiding hyperthermia.  - Neuro-crit consulted and appreciate recommendations.   - vEEG   - Trend S100 B and Neuron specific enolase   - Carotid US post arrest ordered  - Palliative care consulted.     CT head 4/20/2024 No acute intracranial pathology. Old lacunar infarct in the left basal ganglia. White matter changes  of likely chronic small vessel ischemic diseas    Current sedation:  Versed 10 on arrival  Adding Fentanyl to reduce versed      Plan:  - Neuro assessment " as soon as able in post arrest setting  - Continue sedation with a RASS goal -2 to 3 for safety with ECMO cannulation  - Permissive hypercapnea and hypernatremia for neuroprotection     Cardiovascular  # Refractory VF Cardiac arrest   # Cardiogenic shock requiring VA ECMO  # CAD  # Cardiomyopathy  # Dyslipidemia  Peripheral V-A ECMO inserted via RCFA and RCFV distal perfusion cannula present  Angiogram: Culprit RCA stent  TTE: P       ECMO:  Mode: V-A   Pump Type: Cardiohelp  RPM's: 3350  Blood Flow (Circuit) LPM: 3.36 LPM  Sweep LPM: 0.5 LPM  Sweep FiO2   %: 50 %  Venous Pressure  mmHg: -40 mmHg  Arterial Pressure  mmH mmHg  Internal Pressure mmH mmHg  Pressure Change mmH mmHg    Current Pressors/inotropes/antiarrythmic:  Vaso 4    Plan:  - TTE post arrest ordered  - Continue ASA 81mg and ticagrelor 90mg BID   - Hold lipitor for now given shock liver  - Hold ACE/ARB for now given likely reduced renal fxn after arrest  - Holding beta blocker given shock  - Telemetry monitoring  - Trend troponin to peak  - Maximize ECMO flows as able  - Arterial US LE with ECMO cannula placement  - Fluid resuscitation to maintain ECMO flows     Pulmonary  # Acute hypoxemic respiratory failure requiring intubation  # Probable aspiration pneumonia    Vent Settings:   Vent Mode: CMV/AC  (Continuous Mandatory Ventilation/ Assist Control)  FiO2 (%): 60 %  Resp Rate (Set): 10 breaths/min  Tidal Volume (Set, mL): 450 mL  PEEP (cm H2O): 5 cmH2O  Resp: 18      ABG:   Recent Labs   Lab 24  1641 24  1640 24  1610 24  1314 24  1309 24  1203   PH 7.21*  --  7.21* 7.18*  --  7.16*   PCO2 50*  --  46* 50*  --  29*   PO2 150*  --  157* 158*  --  162*   HCO3 20*  --  18* 19*  --  10*   O2PER 60 50  60 60 60   < > 80.0    < > = values in this interval not displayed.       CXR: ETT needs advancement 3 cm, clear    Plan:  - Wean vent as able  - Daily CXR  - Serial ABGs   - Consider scheduled duonebs  if signs of lung dz, currently PRN    - Peridex        Gastrointestinal, Nutrition  # Concern for Shock liver 2/2 cardiac arrest  No known medical hx.     CAP CT 4/20/2024 with P     Recent Labs   Lab 04/20/24  1306   *   ALT 72*   BILITOTAL 0.5   ALBUMIN 2.3*   PROTTOTAL 4.4*   ALKPHOS 150       Plan:  - Monitor LFTs  - NPO now, Nutrition consult once warmed   - Stooling  - GI Prophylaxis: PPI; Protonix 40 mg daily     Renal, Electrolytes  # Chronic kidney disease stage III  # Acute on chronic Renal Injury 2/2 cardiac arrest  # Lactic acidosis  No intake or output data in the 24 hours ending 04/20/24 1034  Recent Labs   Lab 04/20/24  1306 04/20/24  1203 04/20/24  1130    146* 145   POTASSIUM 3.9 2.6* 3.4   CHLORIDE 108*  --   --    CO2 14*  --   --    BUN 44.7*  --   --    CR 2.60*  --   --        Plan:  - Avoid nephrotoxins, renally dose meds  - Monitor urine output    Infectious Disease  # Aspiration Pneumonia- in the setting of arrest.  CXR/CAP as above.   Recent Labs   Lab 04/20/24  1640 04/20/24  1306   WBC 31.9* 23.9*     No data recorded.      Cultures thus far:   Sputum 3+ GPC   BLD NGTD   Urine P   MRSA negative  Antibiotics   Ceftriaxone and Vancomycin                  Plan:  - MRSA Nares  - Continue aspiration coverage x 5D, de escalate Vanc when able  - Monitor for signs of infection  - Avoid fevers     Hematology  # Anemia of critical illness  Recent Labs   Lab 04/20/24  1640 04/20/24  1306 04/20/24  1203   HGB 11.0* 10.8* 5.8*   HCT 33.2* 33.0*  --     242  --      No lab results found in last 7 days.  Hgb stable. No e/o bleeding.  Receiving heparin for ECMO with ACT goal 180-200         ASA/ticagrelor for HAY  DVT PPX: Heparin as above    Plan:  - Continue Heparin with ACT goal as above  - Transfusion parameters:   - 1u PRBC for hbg < 7.0   - 1u platelet for plt < 50   - 1u FFP for INR > 3   - 5u cryoprecipitate for fibrinogen <150     Endocrinology  # Hyperglycemia   # A1C 5.3  -  "No known medical history.     Recent Labs   Lab 04/20/24  1646 04/20/24  1641 04/20/24  1615 04/20/24  1518 04/20/24  1420   * 205* 198* 204* 192*     Plan  - insulin gtt as needed    Integumentary:  - No skin issues    Clinically Significant Risk Factors Present on Admission        # Hypokalemia: Lowest K = 2.6 mmol/L in last 2 days, will replace as needed  # Hypernatremia: Highest Na = 146 mmol/L in last 2 days, will monitor as appropriate  # Hypocalcemia: Lowest iCa = 3.9 mg/dL in last 2 days, will monitor and replace as appropriate  # Hypercalcemia: Highest iCa = 5.3 mg/dL in last 2 days, will monitor as appropriate   # Anion Gap Metabolic Acidosis: Highest Anion Gap = 22 mmol/L in last 2 days, will monitor and treat as appropriate  # Hypoalbuminemia: Lowest albumin = 2.3 g/dL at 4/20/2024  1:06 PM, will monitor as appropriate       # Circulatory Shock: required vasopressors within past 24 hours    # Acute Respiratory Failure: Documented O2 saturation < 91%.  Continue supplemental oxygen as needed     # Cachexia: Estimated body mass index is 16.16 kg/m  as calculated from the following:    Height as of this encounter: 1.702 m (5' 7\").    Weight as of this encounter: 46.8 kg (103 lb 2.8 oz).             Cardiac arrest  Acidosis, Hypokalemia, hypernatremia and Hyperosmolality , and Cachexia  Acute kidney failure, unspecified  CKD POA List: Stage 3 (unspecified if a or b) (GFR 30 - 59)  Encephalopathy: Other encephalopathy    lines/Tubes/Drains:  LDAs (Last charted value/Number of Days):        ICU  Feeding: NPO currently, will advance when able to tolerate  Analgesia:  Fentanyl infusion   Sedation: Midazolam   Thrombopx: Heparin infusion  Head of bed: reverse trend   Ulcers: PPI  Glucose: Insulin infusion    Family will be updated by me    Patient seen and discussed with staff physician Dr. Roger Cantor.    Fela Burris, APRN CNP     "

## 2024-04-20 NOTE — Clinical Note
Arrhythmia Type: polymorphic VT.   Method of Cardioversion: defibrillated.   Energy shock delivered: 20 joules.

## 2024-04-20 NOTE — PROGRESS NOTES
Admitted/transferred from: Cath lab  Reason for admission/transfer: Post ECMO cannulation  2 RN skin assessment: completed by: Tessie MEJIA and Ethan SWEET  Result of skin assessment and interventions/actions: R groin ECMO cannulas, L groin CVC and arterial sheath. No other skin deficits. Bony prominences padded with mepilex.  Height, weight, drug calc weight: Done  Patient belongings: Clothing and shoes taken home by pt wife. Cigarettes and tape measure in pt safe.    ?

## 2024-04-20 NOTE — PLAN OF CARE
Major Shift Events: Pt arrived from cath lab at ~1300 cannulated on VA ECMO. Versed running and Fent added d/t RR in 20s - however turned off this afternoon as pt started riding vent. PERRL. EEG initiated. ST. Vaso running, Epi added when pt pulsatility decreased/narrow, MD aware. Not able to doppler pulses in R foot - team aware of this, utilizing bairhugger for time being. Vent settings: CMV 50%/450/10/5. Difficult time placing almanza catheter, was able to do so when getting smaller [place] size. Multiple loose/watery BMs, rectal tube placed. Mg and K+ replaced. 1amp Bicarb given for Bicarb of 17. Total of 1L LR given for chugging on ECMO circuit. Pt visited by wife, brother and sister at bedside.  Plan: Continue to monitor pt, hemodynamics, neuro status, etc.  For vital signs and complete assessments, please see documentation flowsheets.

## 2024-04-20 NOTE — PROGRESS NOTES
[facility]    ECLS Shift Summary:     ECMO Equipment:  Console Serial Number: [id number]  Circuit Lot Number: [id number]  Oxygenator Lot Number: [id number]    Circuit Assessment: Free of fibrin, clot, and air    Arterial ECMO Cannula: 17 Fr in the Right Femoral Artery  Venous ECMO Cannula: 25 Fr in the Right Femoral Vein          Patient remains on V-A ECMO, all equipment is functioning and alarms are appropriately set. RPM's: 3400 with Blood Flow (Circuit) LPM  Avg: 3.4 LPM  Min: 3.2 LPM  Max: 3.55 LPM L/min. Sweep is at 1.5 LPM and 50 %. Extremities are dusky and cool.     Significant Shift Events: Right lower leg has no blood flow distal to his knee. Decreased pulse pressure since cannulation, discussed IABP decided to hold off given his poor vasculature.     Vent settings:  Vent Mode: CMV/AC  (Continuous Mandatory Ventilation/ Assist Control)  FiO2 (%): 60 %  Resp Rate (Set): 10 breaths/min  Tidal Volume (Set, mL): 450 mL  PEEP (cm H2O): 5 cmH2O  Resp: 10      Anticoagulation:  Dose (units/hr) HEParin: *0 Units/kg/hr  Rate (mL/hr) HEParin: 0 mL/hr  Concentration HEParin: 100 Units/mL        Most recent: ACT  (seconds): 206 seconds    Urine output is  .  Blood loss was minimal. Product given included 2 units of RBC's and 1L of LR.     Intake/Output Summary (Last 24 hours) at 4/20/2024 1816  Last data filed at 4/20/2024 1800  Gross per 24 hour   Intake 1218.96 ml   Output 1000 ml   Net 218.96 ml       Labs:  Recent Labs   Lab 04/20/24  1641 04/20/24  1640 04/20/24  1610 04/20/24  1314 04/20/24  1309 04/20/24  1203   PH 7.21*  --  7.21* 7.18*  --  7.16*   PCO2 50*  --  46* 50*  --  29*   PO2 150*  --  157* 158*  --  162*   HCO3 20*  --  18* 19*  --  10*   O2PER 60 50  60 60 60   < > 80.0    < > = values in this interval not displayed.       Lab Results   Component Value Date    HGB 11.0 (L) 04/20/2024    PHGB 50 (H) 04/20/2024     04/20/2024    FIBR 384 04/20/2024     INR 1.48 (H) 04/20/2024    PTT 97 (H) 04/20/2024    DD >20.00 (HH) 04/20/2024       Plan is continue VA ECMO support.    Megan E. Dressler, RN  ECMO Specialist  4/20/2024 6:16 PM

## 2024-04-20 NOTE — Clinical Note
dry, intact, no bleeding and no hematoma. All cannulae secured, sutured. siria Chavez utilized per protocol.

## 2024-04-20 NOTE — Clinical Note
The first balloon was inserted into the right coronary artery.Max pressure = 18 anson.     Max pressure = 18 anson.    Balloon reinflated a second time: Max pressure = 18 anson. Balloon reinflated a third time: Max pressure = 18 anson.

## 2024-04-21 NOTE — ANESTHESIA CARE TRANSFER NOTE
Patient: Elie Prado    Procedure: Procedure(s):  REMOVAL, CANNULA, ADULT, FOR ECMO       Diagnosis: Cardiac arrest (H) [I46.9]  Diagnosis Additional Information: No value filed.    Anesthesia Type:   General     Note:    Oropharynx: oropharynx clear of all foreign objects, endotracheal tube in place, oral gastic tube in place and ventilatory support  Level of Consciousness: unresponsive    Level of Supplemental Oxygen (L/min / FiO2): 10 (ambu, placed on vent in ICU)  Independent Airway: airway patency not satisfactory and stable  Dentition: dentition unchanged  Vital Signs Stable: post-procedure vital signs reviewed and stable  Report to RN Given: handoff report given  Patient transferred to: ICU (4A)    ICU Handoff: Call for PAUSE to initiate/utilize ICU HANDOFF, Identified Patient, Identified Responsible Provider, Reviewed the Pertinent Medical History, Discussed Surgical Course, Reviewed Intra-OP Anesthesia Management and Issues during Anesthesia, Set Expectations for Post Procedure Period and Allowed Opportunity for Questions and Acknowledgement of Understanding      Vitals:  Vitals Value Taken Time   BP     Temp 36  C (96.8  F) 04/21/24 1735   Pulse 98 04/21/24 1735   Resp     SpO2 70 % 04/21/24 1732   Vitals shown include unfiled device data.    Electronically Signed By: PARTH Kenny CRNA  April 21, 2024  5:35 PM

## 2024-04-21 NOTE — PROGRESS NOTES
"Neurocritical Care Consultation    Reason for critical care admission: Cardiac Arrest  Admitting Team: CICU  Date of Service:  04/21/2024  Date of Admission:  4/20/2024  Hospital Day: 2    Assessment/Plan  [facility] 64 year [facility] male with history of CKD and opiate dependence who presented to [facility] on 4/20/2024 for VA ECMO cannulation for refractory VF cardiac arrest. Coronary angiography at [facility] revealed culprit RCA lesion.     NCC was consulted on 4/29/2024 for post-cardiac arrest neuro-prognostication.    24 hour events:   -Right limb ischemia with worsening lactic acidosis  -Discussing urgent ECMO decannulation    Neuro  #Hypoxic encephalopathy 2.2 cardiac arrest  Post arrest day: 1  Length of downtime: 2 minutes for CPR, 40 minutes to cannulation  -Witnessed arrest: Not witnessed by brother but LKW approximately 2 minutes.  -ROSC: No, rhythm: VF  -Goal normothermia   -Current temp: 37.0    Analgesics & sedation  Prior sedation: Fentanyl, Midazolam   Current sedation: None    Exam findings   -Cough: Yes  -Gag: Yes  -Pupils: Left pupil 2 mm with NPI 4.9, Right pupil 2 mm with NPI 4.9     Neuroimaging  -Day 1 CTH shows: \"No acute intracranial pathology, old lacunar infarct in the left basal ganglia, white matter changes of likely chronic small vessel ischemic disease.\"   -Consider repeating CTH on day 3     Neurophysiology  -Continue vEEG with VA ECMO  -vEEG shows: Moderate to severe encephalopathy observed at bedside. Formal read from epileptologist pending.       Biomarkers  -Neuron-specific Enolase (NSE) results: In process   -S 100B protein: In process      Recommendations  -Avoid hypotonic solutions as they may worsen cerebral edema   -Avoid nitroprusside or nitroglycerin as they may also worsen cerbral edema  -Advise slow correction of hypernatremia if needed  -NCC will continue to follow, please call *[phone] with any questions.    TIME SPENT ON THIS ENCOUNTER   I spent 35 minutes of " critical care time on the unit/floor managing the care of Derrick [facility]. Upon evaluation, this patient had a high probability of imminent or life-threatening deterioration due to concern for post-cardiac arrest encephalopathy, which required my direct attention, intervention, and personal management . Greater than 50% of my time was spent at the bedside counseling the patient and/or coordinating care including chart review, history, exam, documentation, and further activities per this note. I have personally reviewed the following data/imaging over the past 24 hours.     The patient was seen and discussed with the NCC attending, Dr. Cormier.    Ofe ALBA CNP  Neurocritical care nurse practitioner  Pager: [phone]  Ascom: *[phone] available M-Galvez [phone] to [phone]       Current Medications:  Current Facility-Administered Medications   Medication Dose Route Frequency Provider Last Rate Last Admin    aspirin (ASA) chewable tablet 81 mg  81 mg Per Feeding Tube Daily Fela Burris APRN CNP   81 mg at 04/21/24 0827    chlorhexidine (PERIDEX) 0.12 % solution 15 mL  15 mL Swish & Spit BID Fela Burris APRN CNP   15 mL at 04/21/24 0833    pantoprazole (PROTONIX) IV push injection 40 mg  40 mg Intravenous Daily Fela Burris APRN CNP   40 mg at 04/21/24 0833    piperacillin-tazobactam (ZOSYN) 2.25 g vial to attach to  ml bag  2.25 g Intravenous Q6H Seth Olson  mL/hr at 04/21/24 1123 2.25 g at 04/21/24 1123    ticagrelor (BRILINTA) tablet 90 mg  90 mg Oral BID Fela Burris APRN CNP   90 mg at 04/21/24 0827    vancomycin (VANCOCIN) 500 mg vial to attach to  mL bag  500 mg Intravenous Once Sydni Cheng MD        vancomycin place martinez - receiving intermittent dosing  1 each Intravenous See Admin Instructions Sydni Cheng MD           PRN Medications:  Current Facility-Administered Medications   Medication Dose Route Frequency  Provider Last Rate Last Admin    artificial tears ophthalmic ointment   Both Eyes Q8H PRN Sydni Cheng MD        calcium chloride 1 g in sodium chloride 0.9 % 100 mL intermittent infusion  1 g Intravenous Q6H PRN Fela Burris APRN CNP        dextrose 10% infusion   Intravenous Continuous PRN Fela Burris APRN CNP        glucose gel 15-30 g  15-30 g Oral Q15 Min PRN Fela Burris APRN CNP        Or    dextrose 50 % injection 25-50 mL  25-50 mL Intravenous Q15 Min PRN Fela Burris APRN CNP   25 mL at 04/21/24 1214    Or    glucagon injection 1 mg  1 mg Subcutaneous Q15 Min PRN Fela uBrris APRN CNP        fentaNYL (SUBLIMAZE) 50 mcg/mL bolus from pump  50 mcg Intravenous Q30 Min PRN Fela Burris APRN CNP        heparin ANTICOAGULANT bolus dose from infusion pump 330.5-661 Units  5-10 Units/kg (Ideal) Other Q30 Min PRN Sydni Cheng MD        lidocaine (LMX4) cream   Topical Q1H PRN Fela Burris APRN CNP        lidocaine 1 % 0.1-1 mL  0.1-1 mL Other Q1H PRN Fela Burris APRN CNP        magnesium sulfate 2 g in 50 mL sterile water intermittent infusion  2 g Intravenous Daily PRN Fela Burris APRN CNP        magnesium sulfate 4 g in 100 mL sterile water intermittent infusion  4 g Intravenous Q4H PRN Fela Burris APRN CNP 50 mL/hr at 04/20/24 1748 4 g at 04/20/24 1748    naloxone (NARCAN) injection 0.2 mg  0.2 mg Intravenous Q2 Min PRN Felicitas, Cardiologist, MD        Or    naloxone (NARCAN) injection 0.4 mg  0.4 mg Intravenous Q2 Min PRN Felicitas CardiologMD shad        Or    naloxone (NARCAN) injection 0.2 mg  0.2 mg Intramuscular Q2 Min PRN Felicitas CardiologMD shad        Or    naloxone (NARCAN) injection 0.4 mg  0.4 mg Intramuscular Q2 Min PRN Felicitas CardiologMD shad        potassium chloride 20 mEq in 50 mL intermittent infusion  20 mEq Intravenous Q1H PRN Fela Burris, APRN CNP   20 mEq at 04/20/24 8996    sodium  chloride (PF) 0.9% PF flush 3 mL  3 mL Intracatheter q1 min prn Fela Burris APRN CNP        sodium phosphate 10 mmol in sodium chloride 0.9 % 250 mL intermittent infusion  10 mmol Intravenous Daily PRN Fela Burris APRN CNP   10 mmol at 04/21/24 0015    sodium phosphate 15 mmol in sodium chloride 0.9 % 250 mL intermittent infusion  15 mmol Intravenous Daily PRN Fela Burris APRN CNP        sodium phosphate 20 mmol in sodium chloride 0.9 % 250 mL intermittent infusion  20 mmol Intravenous Q6H PRN Fela Burris APRN CNP        sodium phosphate 25 mmol in sodium chloride 0.9 % 500 mL intermittent infusion  25 mmol Intravenous Q8H PRN Fela Burris APRN CNP           Infusions:  Current Facility-Administered Medications   Medication Dose Route Frequency Provider Last Rate Last Admin    amiodarone (NEXTERONE) 6 mg/mL in sodium chloride 0.9% in non-PVC container 250 mL MAX concentration CENTRAL line infusion  1 mg/min Intravenous Continuous Sydni Cheng MD 10 mL/hr at 04/21/24 1300 1 mg/min at 04/21/24 1300    dextrose 10% infusion   Intravenous Continuous PRN Fela Burris APRN CNP        dextrose 5% infusion   Intravenous Continuous Fela Burris APRN CNP        EPINEPHrine (ADRENALIN) 5 mg in sodium chloride 0.9 % 250 mL infusion CENTRAL  0.03-0.3 mcg/kg/min (Dosing Weight) Intravenous Continuous Fela Burris APRN CNP 11.2 mL/hr at 04/21/24 1259 0.08 mcg/kg/min at 04/21/24 1259    fentaNYL (SUBLIMAZE) infusion   mcg/hr Intravenous Continuous Fela Burris APRN CNP   Stopped at 04/20/24 1715    heparin 2 unit/mL in 0.9% NaCl (for REPERFUSION CATHETER)  3 mL/hr Intravenous Continuous Fela Burris APRN CNP   Held at 04/20/24 1429    heparin 25,000 units in 0.45% NaCl 250 mL ANTICOAGULANT infusion  0-5,000 Units/hr Intravenous Continuous Seth Olson MD 8 mL/hr at 04/21/24 1200 800 Units/hr at 04/21/24 1200    insulin regular (MYXREDLIN) 1  "unit/mL infusion  0-24 Units/hr Intravenous Continuous Fela Burris APRN CNP   Stopped at 04/20/24 2302    midazolam (VERSED) 100 mg/100 mL NS infusion - ADULT  1-8 mg/hr Intravenous Continuous Johnnie Mccrary MD   Stopped at 04/20/24 1715    niCARdipine 40 mg in 200 mL NS (CARDENE) infusion  0.5-15 mg/hr Intravenous Continuous Seth Olson MD   Stopped at 04/21/24 1147    norepinephrine (LEVOPHED) 16 mg in  mL infusion MAX CONC CENTRAL LINE  0.01-0.6 mcg/kg/min (Dosing Weight) Intravenous Continuous Fela Burris APRN CNP 3.5 mL/hr at 04/21/24 1258 0.08 mcg/kg/min at 04/21/24 1258    vasopressin 1 unit/mL MAX Conc (PITRESSIN) infusion  0.5-4 Units/hr Intravenous Continuous Fela Burris APRN CNP 4 mL/hr at 04/21/24 1312 4 Units/hr at 04/21/24 1312       Physical Examination:  Vitals: Pulse 112   Temp 98.6  F (37  C)   Resp 21   Ht 1.702 m (5' 7\")   Wt 44.7 kg (98 lb 8.7 oz)   SpO2 100%   BMI 15.43 kg/m    General: Adult male patient, lying in bed, critically-ill  HEENT: Normocephalic, atraumatic, no icterus  Cardiac: Sinus tachycardia on bedside monitor   Pulm: Unlabored, expansion symmetric, no retractions or use of accessory muscles, assisted mechanically   Abdomen: Soft, non-distended abdomen   Extremities: Warm, no edema, right limb with ischemia  Skin: No rash or lesion observed on exposed skin  Psych: Calm   Neuro:  Mental status: Does not open eyes, does not follow commands, intubated.   Cranial nerves: PERRL, conjugate gaze, cough and gag present with deep suction.   Motor: Decreased bulk and normal tone. No abnormal movements. 0/5 strength in 4/4 extremities with no active withdraw to noxious stimuli in 4/4 extremities.   Sensory: does not respond to painful stimuli in 4/4 extremities.   Coordination: JIMMY, deferred.   Gait: JIMMY, deferred.    Labs and Imaging:    Results for orders placed or performed during the hospital encounter of 04/20/24 (from the past 24 " hour(s))   Blood gas arterial   Result Value Ref Range    pH Arterial 7.18 (LL) 7.35 - 7.45    pCO2 Arterial 50 (H) 35 - 45 mm Hg    pO2 Arterial 158 (H) 80 - 105 mm Hg    FIO2 60     Bicarbonate Arterial 19 (L) 21 - 28 mmol/L    Base Excess/Deficit Arterial -9.7 (L) -3.0 - 3.0 mmol/L    Gm's Test Artline     Oxyhemoglobin Arterial 98 92 - 100 %    O2 Sat, Arterial 99.8 (H) 95.0 - 96.0 %    Lactic Acid 7.8 (HH) 0.7 - 2.0 mmol/L    Narrative    In healthy individuals, oxyhemoglobin (O2Hb) and oxygen saturation (SO2) are approximately equal. In the presence of dyshemoglobins, oxyhemoglobin can be considerably lower than oxygen saturation.   Calcium ionized whole blood   Result Value Ref Range    Calcium Ionized Whole Blood 5.0 4.4 - 5.2 mg/dL    Lactic Acid 7.8 (HH) 0.7 - 2.0 mmol/L   Lactic acid whole blood   Result Value Ref Range    Lactic Acid 7.8 (HH) 0.7 - 2.0 mmol/L   Activated clotting time celite, POCT   Result Value Ref Range    Activated Clotting Time (Celite) POCT 267 (H) 74 - 150 seconds   MRSA MSSA PCR, Nasal Swab    Specimen: Nares, Bilateral; Swab   Result Value Ref Range    MRSA Target DNA Negative Negative    SA Target DNA Negative     Narrative    The [employer]  Xpert SA Nasal Complete assay performed in the [employer]  Dx System is a qualitative in vitro diagnostic test designed for rapid detection of Staphylococcus aureus (SA) and methicillin-resistant Staphylococcus aureus (MRSA) from nasal swabs in patients at risk for nasal colonization. The test utilizes automated real-time polymerase chain reaction (PCR) to detect MRSA/SA DNA. The Xpert SA Nasal Complete assay is intended to aid in the prevention and control of MRSA/SA infections in healthcare settings. The assay is not intended to diagnose, guide or monitor treatment for MRSA/SA infections, or provide results of susceptibility to methicillin. A negative result does not preclude MRSA/SA nasal colonization.    Respiratory Aerobic Bacterial  Culture with Gram Stain    Specimen: Endotracheal; Sputum   Result Value Ref Range    Culture Culture in progress     Gram Stain Result >25 PMNs (A)     Gram Stain Result 3+ Gram positive cocci (A)    EKG 12-lead, tracing only   Result Value Ref Range    Systolic Blood Pressure  mmHg    Diastolic Blood Pressure  mmHg    Ventricular Rate 131 BPM    Atrial Rate 131 BPM    WV Interval 128 ms    QRS Duration 98 ms     ms    QTc 507 ms    P Axis 85 degrees    R AXIS 94 degrees    T Axis 245 degrees    Interpretation ECG       Sinus tachycardia  Rightward axis  Pulmonary disease pattern  Inferior infarct , age undetermined  Abnormal ECG  No previous ECGs available     CT Chest Abdomen Pelvis w/o Contrast    Narrative    EXAMINATION: CT CHEST ABDOMEN PELVIS W/O CONTRAST, 4/20/2024 1:26 PM    INDICATION: out of hospital cardiac arrest with MARGUERITE, looking for  bleeding or any other pathology    COMPARISON STUDY: None available    TECHNIQUE: CT scan of the chest, abdomen and pelvis was performed on  multidetector CT scanner using volumetric acquisition technique and  images were reconstructed in multiple planes with variable thickness  and reviewed on dedicated workstations.     CONTRAST: Without contrast    CT scan radiation dose is optimized to minimum requisite dose using  automated dose modulation techniques.    FINDINGS:  Endotracheal tube terminates in the midthoracic trachea. Enteric tube  terminates in the stomach. Lower approach venous ECMO cannula  terminates in the mid SVC. Right femoral arterial ECMO cannula  terminates in the distal abdominal aorta. Left femoral arterial  central venous catheter terminates in the distal abdominal aorta. Left  femoral venous catheter terminates in the proximal left common iliac  artery.    Chest:   Mediastinum: The visualized thyroid is unremarkable. The heart is  normal in size. No significant  pericardial effusion. Moderate  coronary artery calcifications. The ascending  aorta and main pulmonary  artery are normal in caliber. No  thoracic lymphadenopathy. The  esophagus is unremarkable.      Lungs: The central airway is clear. No significant pleural effusion.  No pneumothorax. Left lower lobe consolidative opacity with additional  consolidative and groundglass opacities of the lung bases bilaterally.  Crazy paving pattern demonstrated in the left upper lobe.     Abdomen and Pelvis:  Liver: No mass. No intrahepatic biliary ductal dilation.    Biliary System: Vicarious excretion of contrast material in the  gallbladder. No definite cholelithiasis. No evidence of acute  cholecystitis. No extrahepatic biliary ductal dilation.    Pancreas: No mass or pancreatic ductal dilation.    Adrenal glands: No mass or nodules    Spleen: Normal.    Kidneys: Atrophic appearance of the left kidney. Fetal lobulation of  the right kidney. Scattered simple cysts bilaterally. No  hydronephrosis.    Gastrointestinal tract:  No abnormally dilated loops of bowel. Liquid  contents of the small and large bowel.    Mesentery/peritoneum/retroperitoneum: No mass. No free fluid or air.    Lymph nodes: No significant lymphadenopathy.    Vasculature: Nonaneurysmal abdominal aorta with scattered aortobiiliac  atherosclerotic calcifications.    Pelvis: The urinary bladder is well distended with circumferential  bladder wall thickening and small right posterolateral diverticulum.   The prostate is mildly enlarged with coarse prostatic calcifications.    Osseous structures: Nondisplaced midsternal fracture. No displaced rib  fracture. Evaluation for nondisplaced rib fractures limited by  respiratory motion artifact.      Soft tissues: Within normal limits.      Impression    IMPRESSION:   1. Left basilar consolidation suspicious for infection. Additional  scattered opacities bilaterally may represent pulmonary edema,  aspiration, and/or infection.  2. Nondisplaced midsternal fracture, likely sequelae of  cardiopulmonary  resuscitation.  3. No acute intra-abdominal findings. Consider Russo decompression for  prominent distended urinary bladder.    I have personally reviewed the examination and initial interpretation  and I agree with the findings.    MARTHA WANG MD         SYSTEM ID:  [id number]   Urine Drug Screen    Narrative    The following orders were created for panel order Urine Drug Screen.  Procedure                               Abnormality         Status                     ---------                               -----------         ------                     Urine Drug Screen Panel[[id number]]      Abnormal            Final result                 Please view results for these tests on the individual orders.   UA with Microscopic reflex to Culture    Specimen: Urine, Catheter   Result Value Ref Range    Color Urine Straw Colorless, Straw, Light Yellow, Yellow    Appearance Urine Slightly Cloudy (A) Clear    Glucose Urine 30 (A) Negative mg/dL    Bilirubin Urine Negative Negative    Ketones Urine Negative Negative mg/dL    Specific Gravity Urine 1.012 1.003 - 1.035    Blood Urine Moderate (A) Negative    pH Urine 6.0 5.0 - 7.0    Protein Albumin Urine 70 (A) Negative mg/dL    Urobilinogen Urine Normal Normal, 2.0 mg/dL    Nitrite Urine Negative Negative    Leukocyte Esterase Urine Large (A) Negative    WBC Clumps Urine Present (A) None Seen /HPF    RBC Urine 2 <=2 /HPF    WBC Urine >182 (H) <=5 /HPF    Squamous Epithelials Urine <1 <=1 /HPF    Transitional Epithelials Urine <1 <=1 /HPF    Narrative    Urine Culture ordered based on laboratory criteria   Urine Drug Screen Panel   Result Value Ref Range    Amphetamines Urine Screen Negative Screen Negative    Barbituates Urine Screen Negative Screen Negative    Benzodiazepine Urine Screen Positive (A) Screen Negative    Cannabinoids Urine Screen Negative Screen Negative    Cocaine Urine Screen Negative Screen Negative    Fentanyl Qual Urine Screen Negative Screen Negative     Opiates Urine Screen Positive (A) Screen Negative    PCP Urine Screen Negative Screen Negative   Glucose by meter   Result Value Ref Range    GLUCOSE BY METER POCT 192 (H) 70 - 99 mg/dL   US Lower Extremity Arterial Duplex Bilateral   Result Value Ref Range    Radiologist flags Occluded right distal SFA and distally. (Urgent)     Narrative    Exam: Duplex ultrasound of the bilateral lower extremity arteries  dated 4/20/2024 3:00 PM     Clinical information: Baseline to assess blood flow to Lower  Extremities (VA ECMO)     Comparison: None    Technique: Grayscale (B-mode), color Doppler, and duplex spectral  Doppler ultrasound of the lower extremity arteries. Velocity  measurements obtained with angle correction of 60 degrees or less.    Findings:     Right lower extremity:     Right groin vasculature are obscured due to ECMO cannulas.  Mid SFA:Velocity:  3 cm/sec.   Distal SFA: Velocity: No flow visualized.     Popliteal artery: No flow visualized.    PTA ankle: No flow visualized.    ELISSA ankle: No flow visualized.    Left lower extremity:    Right groin vasculature are obscured due to central catheters.  Mid SFA: Velocity: 13 cm/sec.   Distal SFA: Velocity: 4 cm/sec. Waveforms: Monophasic    Popliteal artery, proximal: Velocity: 11 cm/sec. Waveforms:  Monophasic.    PTA ankle: Velocity: 19 cm/sec. Waveforms: Monophasic  ELISSA ankle: Velocity: 3 cm/sec. Waveforms: Monophasic      Impression    Impression:   1. Right leg: Diminished flow in the mid SFA measuring 3 cm/s. No flow  is visualized in the distal SFA, popliteal artery, PTA, or ELISSA.  2. Left leg: Patent arteries of the left lower extremity with  diminished flow throughout.    [Urgent Result: Occluded right distal SFA and distally.]    Finding was identified on 4/20/2024 3:58 PM.     Cheryle Lua RN was contacted by Dr. Dunn at 4/20/2024 4:32 PM and  verbalized understanding of the urgent finding.     I have personally reviewed the examination and initial  interpretation  and I agree with the findings.    BORIS PABON DO         SYSTEM ID:  [id number]   US Carotid Bilateral    Narrative    Exam: Bilateral carotid duplex Doppler ultrasound dated 4/20/2024 3:00  PM    Clinical history: Cardiac Arrest on ECMO    Comparison Study: None    Technique: Grayscale (B-mode) and duplex and spectral Doppler  ultrasound of the extracranial internal carotid, external carotid,  vertebral artery origins, right brachiocephalic/subclavian and left  subclavian arteries. Velocity measurements obtained with angle  correction at or less than 60 degrees.    Findings:    Right side:     Plaque Morphology: Predominantly echogenic, Smooth       Proximal CCA: 95/82 cm/sec     Mid CCA: 99/86 cm/sec     Distal CCA: 74/63 cm/sec     External CA: 47/40 cm/sec       Proximal ICA: 89/78 cm/sec     Mid ICA: 70/61 cm/sec     Distal ICA: 101/80 cm/sec       Vertebral artery: Not visualized.     ICA/CCA ratio: 1.02    Left side:     Plaque Morphology: Predominantly echogenic, Smooth       Proximal CCA: 92 cm/sec     Mid CCA: 104/85 cm/sec     Distal CCA: 85/66 cm/sec          External CA: 50 cm/sec       Proximal ICA: 77 cm/sec     Mid ICA: 79 cm/sec     Distal ICA: 96 cm/sec       Vertebral artery: 76 cm/sec, antegrade    ICA/CCA ratio: 0.92      Impression    Impression:    1. Right side:        Degree of stenosis of the internal carotid artery: Less than 50 %.    2. Left side:         Degree of stenosis of the internal carotid artery: Less than 50 %.      [facility] Accreditation Commission Updated Recommendations for  Carotid Stenosis Interpretation Criteria - October 2021.  https://[email or web address]/wp-content/uploads/[email or web address]/10/IAC-Vascular-Test  [email or web address]_Tkvgcyh-Auwcpjrzlkmedht-for-Carotid-Stenosis-Interpre  ation-Criteria.pdf    Society for Vascular Surgery Clinical Practice Guidelines for  Management of Extracranial Cerebrovascular Disease. Journal of  Vascular Surgery Vol. 75, Issue 1,  Supplement [id number] Published  online: June 18, 2021 (additional criteria adjustment for >70% ICA  stenosis)         Normal            ICA PSV: < 180 cm/s            Plaque: None            ICA/CCA PSV Ratio: < 2.0            ICA EDV: < 40 cm/s         < 50%            ICA PSV: < 180 cm/s            Plaque: < 50%            ICA/CCA PSV Ratio: < 2.0            ICA EDV: < 40 cm/s         50 - 69%            ICA PSV: < 180 - 230 cm/s            ICA/CCA PSV Ratio: 2.0 - 4.0            ICA EDV: 40 - 100 cm/s         > 70%            ICA PSV: > 230 cm/s AND             ICA/CCA PSV Ratio: > 4.0 or ICA EDV: > 100 cm/s         Total occlusion            ICA PSV: Undetectable            ICA/CCA PSV Ratio: N/A            ICA EDV: N/A    I have personally reviewed the examination and initial interpretation  and I agree with the findings.    MIRIAN GARRETT         SYSTEM ID:  [id number]   XR Chest Port 1 View    Narrative    Exam: XR CHEST PORT 1 VIEW, 4/20/2024 3:15 PM    Indication: Check endotracheal tube placement and ECLS cannula  placement. DO NOT log-roll patient.  Place film under patient using  patient safety handling process.    Comparison: CT 4/20/2024    Findings:   Portable frontal view of the chest. Endotracheal tube tip projects  over the midthoracic trachea. Enteric tube courses below the  diaphragm. Lower approach ECMO cannula tip projects over the low SVC.  The cardiac silhouette is within normal limits. No significant pleural  effusion or pneumothorax. Left greater than right interstitial with  patchy airspace opacities, similar to findings on CT.      Impression    Impression:   1. Endotracheal tube tip projects over the midthoracic trachea.  2. Lower approach ECMO cannula tip projects over the low SVC.     I have personally reviewed the examination and initial interpretation  and I agree with the findings.    BORIS PABON DO         SYSTEM ID:  [id number]   XR Abdomen Port 1 View    Narrative    EXAMINATION:   XR ABDOMEN PORT 1 VIEW 4/20/2024 3:15 PM     COMPARISON: CT 4/20/2024.    HISTORY: OG placement.    TECHNIQUE: Frontal view of the abdomen.    FINDINGS: Gastric tube sidehole and tip projecting over the stomach.  Lower approach venous ECMO cannula tip projects over the low SVC.  Right lower approach arterial ECMO catheter tip projects over the  lower abdominal aorta. Left lower extremity approach arterial and  venous catheters project over the lower abdomen. No abnormally dilated  loops of bowel.  No pneumatosis or portal venous gas.       Impression    IMPRESSION: Gastric tube sidehole and tip project over the stomach.    I have personally reviewed the examination and initial interpretation  and I agree with the findings.    BORIS PABON DO         SYSTEM ID:  [id number]   Glucose by meter   Result Value Ref Range    GLUCOSE BY METER POCT 204 (H) 70 - 99 mg/dL   Blood gas arterial   Result Value Ref Range    pH Arterial 7.21 (L) 7.35 - 7.45    pCO2 Arterial 46 (H) 35 - 45 mm Hg    pO2 Arterial 157 (H) 80 - 105 mm Hg    FIO2 60     Bicarbonate Arterial 18 (L) 21 - 28 mmol/L    Base Excess/Deficit Arterial -9.5 (L) -3.0 - 3.0 mmol/L    Gm's Test Artline     Oxyhemoglobin Arterial 98 92 - 100 %    O2 Sat, Arterial 100.0 (H) 95.0 - 96.0 %    Narrative    In healthy individuals, oxyhemoglobin (O2Hb) and oxygen saturation (SO2) are approximately equal. In the presence of dyshemoglobins, oxyhemoglobin can be considerably lower than oxygen saturation.   Glucose by meter   Result Value Ref Range    GLUCOSE BY METER POCT 198 (H) 70 - 99 mg/dL   CBC with platelets   Result Value Ref Range    WBC Count 31.9 (H) 4.0 - 11.0 10e3/uL    RBC Count 3.90 (L) 4.40 - 5.90 10e6/uL    Hemoglobin 11.0 (L) 13.3 - 17.7 g/dL    Hematocrit 33.2 (L) 40.0 - 53.0 %    MCV 85 78 - 100 fL    MCH 28.2 26.5 - 33.0 pg    MCHC 33.1 31.5 - 36.5 g/dL    RDW 14.8 10.0 - 15.0 %    Platelet Count 265 150 - 450 10e3/uL   Comprehensive metabolic panel    Result Value Ref Range    Sodium 144 135 - 145 mmol/L    Potassium 3.9 3.4 - 5.3 mmol/L    Carbon Dioxide (CO2) 18 (L) 22 - 29 mmol/L    Anion Gap 14 7 - 15 mmol/L    Urea Nitrogen 44.9 (H) 8.0 - 23.0 mg/dL    Creatinine 2.64 (H) 0.67 - 1.17 mg/dL    GFR Estimate 18 (L) >60 mL/min/1.73m2    Calcium 9.0 8.2 - 9.6 mg/dL    Chloride 112 (H) 98 - 107 mmol/L    Glucose 219 (H) 70 - 99 mg/dL    Alkaline Phosphatase 128 40 - 150 U/L     (H) 0 - 45 U/L    ALT 89 (H) 0 - 70 U/L    Protein Total 4.7 (L) 6.4 - 8.3 g/dL    Albumin 2.6 (L) 3.5 - 5.2 g/dL    Bilirubin Total 0.8 <=1.2 mg/dL   Heparin Unfractionated Anti Xa Level   Result Value Ref Range    Anti Xa Unfractionated Heparin 0.40 For Reference Range, See Comment IU/mL    Narrative    Therapeutic Range: UFH: 0.25-0.50 IU/mL for low intensity dosing,  0.30-0.70 IU/mL for high intensity dosing DVT and PE.  This test is not validated for other direct factor X inhibitors (e.g. rivaroxaban, apixaban, edoxaban, betrixaban, fondaparinux) and should not be used for monitoring of other medications.   INR   Result Value Ref Range    INR 1.48 (H) 0.85 - 1.15   Partial thromboplastin time   Result Value Ref Range    aPTT 97 (H) 22 - 38 Seconds   Magnesium   Result Value Ref Range    Magnesium 1.3 (L) 1.7 - 2.3 mg/dL   Troponin T, High Sensitivity   Result Value Ref Range    Troponin T, High Sensitivity >10,000 (HH) <=22 ng/L   D dimer quantitative   Result Value Ref Range    D-Dimer Quantitative >20.00 (HH) 0.00 - 0.50 ug/mL FEU    Narrative    This D-dimer assay is intended for use in conjunction with a clinical pretest probability assessment model to exclude pulmonary embolism (PE) and deep venous thrombosis (DVT) in outpatients suspected of PE or DVT. The cut-off value is 0.50 ug/mL FEU.    For patients 50 years of age or older, the application of age-adjusted cut-off values for D-Dimer may increase the specificity without significant effect on sensitivity. The  literature suggested calculation age adjusted cut-off in ug/L = age in years x 10 ug/L. The results in this laboratory are reported as ug/mL rather than ug/L. The calculation for age adjusted cut off in ug/mL= age in years x 0.01 ug/mL. For example, the cut off for a 76 year old male is 76 x 0.01 ug/mL = 0.76 ug/mL (760 ug/L).    M Taylor et al. Age adjusted D-dimer cut-off levels to rule out pulmonary embolism: The ADJUST-PE Study. MICHAEL 2014;311:1481-4198.; HJ Nino et al. Diagnostic accuracy of conventional or age adjusted D-dimer cutoff values in older patients with suspected venous thromboembolism. Systemic review and meta-analysis. BMJ 2013:346:f2492.   Blood gas ELS venous   Result Value Ref Range    pH ELS Venous 7.16 (LL) 7.32 - 7.43    pCO2 ELS Venous 61 (H) 40 - 50 mm Hg    pO2 ELS Venous 39 25 - 47 mm Hg    Bicarbonate ELS Venous 22 21 - 28 mmol/L    Base Excess/Deficit Venous -7.3 (L) -3.0 - 3.0 mmol/L    FIO2 60     Oxyhemoglobin ELS Venous 66 %    O2 Saturation ELS Venous 67.7 (L) 70.0 - 75.0 %    Narrative    In healthy individuals, oxyhemoglobin (O2Hb) and oxygen saturation (SO2) are approximately equal. In the presence of dyshemoglobins, oxyhemoglobin can be considerably lower than oxygen saturation.   Blood gas ELS arterial   Result Value Ref Range    pH ELS Arterial 7.18 (LL) 7.35 - 7.45    pCO2 ELS Arterial 55 (H) 35 - 45 mm Hg    pO2 ELS Arterial 109 (H) 80 - 105 mm Hg    Bicarbonate ELS Arterial 20 (L) 21 - 28 mmol/L    Base Excess/Deficit Arterial -8.1 (L) -3.0 - 3.0 mmol/L    FIO2 50     Oxyhemoglobin ELS Arterial 97 75 - 100 %    O2 Saturation ELS Arterial 98.2 (H) 95.0 - 96.0 %    Narrative    In healthy individuals, oxyhemoglobin (O2Hb) and oxygen saturation (SO2) are approximately equal. In the presence of dyshemoglobins, oxyhemoglobin can be considerably lower than oxygen saturation.   Fibrinogen activity   Result Value Ref Range    Fibrinogen Activity 384 170 - 490 mg/dL   CK total    Result Value Ref Range    CK 4,138 (HH) 39 - 308 U/L   Phosphorus   Result Value Ref Range    Phosphorus 4.3 2.5 - 4.5 mg/dL   Blood gas arterial   Result Value Ref Range    pH Arterial 7.21 (L) 7.35 - 7.45    pCO2 Arterial 50 (H) 35 - 45 mm Hg    pO2 Arterial 150 (H) 80 - 105 mm Hg    FIO2 60     Bicarbonate Arterial 20 (L) 21 - 28 mmol/L    Base Excess/Deficit Arterial -8.2 (L) -3.0 - 3.0 mmol/L    Gm's Test Artline     Oxyhemoglobin Arterial 97 92 - 100 %    O2 Sat, Arterial 99.5 (H) 95.0 - 96.0 %    Narrative    In healthy individuals, oxyhemoglobin (O2Hb) and oxygen saturation (SO2) are approximately equal. In the presence of dyshemoglobins, oxyhemoglobin can be considerably lower than oxygen saturation.   Calcium ionized whole blood   Result Value Ref Range    Calcium Ionized Whole Blood 5.1 4.4 - 5.2 mg/dL   Glucose whole blood   Result Value Ref Range    Glucose 205 (H) 70 - 99 mg/dL   Lactic acid whole blood   Result Value Ref Range    Lactic Acid 4.1 (HH) 0.7 - 2.0 mmol/L   Glucose by meter   Result Value Ref Range    GLUCOSE BY METER POCT 197 (H) 70 - 99 mg/dL   Activated clotting time celite, POCT   Result Value Ref Range    Activated Clotting Time (Celite) POCT 209 (H) 74 - 150 seconds   Blood Culture Hand, Left    Specimen: Hand, Left; Blood   Result Value Ref Range    Culture No growth after 12 hours    Blood Culture Hand, Right    Specimen: Hand, Right; Blood   Result Value Ref Range    Culture No growth after 12 hours    Blood gas arterial   Result Value Ref Range    pH Arterial 7.30 (L) 7.35 - 7.45    pCO2 Arterial 35 35 - 45 mm Hg    pO2 Arterial 170 (H) 80 - 105 mm Hg    FIO2 60     Bicarbonate Arterial 17 (L) 21 - 28 mmol/L    Base Excess/Deficit Arterial -8.3 (L) -3.0 - 3.0 mmol/L    Gm's Test Artline     Oxyhemoglobin Arterial 98 92 - 100 %    O2 Sat, Arterial >100.0 (H) 95.0 - 96.0 %    Lactic Acid 4.8 (HH) 0.7 - 2.0 mmol/L    Narrative    In healthy individuals, oxyhemoglobin (O2Hb)  and oxygen saturation (SO2) are approximately equal. In the presence of dyshemoglobins, oxyhemoglobin can be considerably lower than oxygen saturation.   Glucose by meter   Result Value Ref Range    GLUCOSE BY METER POCT 263 (H) 70 - 99 mg/dL   Activated clotting time celite, POCT   Result Value Ref Range    Activated Clotting Time (Celite) POCT 178 (H) 74 - 150 seconds   Blood gas arterial   Result Value Ref Range    pH Arterial 7.34 (L) 7.35 - 7.45    pCO2 Arterial 35 35 - 45 mm Hg    pO2 Arterial 140 (H) 80 - 105 mm Hg    FIO2 50     Bicarbonate Arterial 19 (L) 21 - 28 mmol/L    Base Excess/Deficit Arterial -6.3 (L) -3.0 - 3.0 mmol/L    Gm's Test Artline     Oxyhemoglobin Arterial 98 92 - 100 %    O2 Sat, Arterial >100.0 (H) 95.0 - 96.0 %    Peep 5 cm H2O    Lactic Acid 5.2 (HH) 0.7 - 2.0 mmol/L    Narrative    In healthy individuals, oxyhemoglobin (O2Hb) and oxygen saturation (SO2) are approximately equal. In the presence of dyshemoglobins, oxyhemoglobin can be considerably lower than oxygen saturation.   Glucose by meter   Result Value Ref Range    GLUCOSE BY METER POCT 241 (H) 70 - 99 mg/dL   Glucose by meter   Result Value Ref Range    GLUCOSE BY METER POCT 199 (H) 70 - 99 mg/dL   Blood gas arterial   Result Value Ref Range    pH Arterial 7.31 (L) 7.35 - 7.45    pCO2 Arterial 39 35 - 45 mm Hg    pO2 Arterial 120 (H) 80 - 105 mm Hg    FIO2 50     Bicarbonate Arterial 20 (L) 21 - 28 mmol/L    Base Excess/Deficit Arterial -6.0 (L) -3.0 - 3.0 mmol/L    Gm's Test Artline     Oxyhemoglobin Arterial 97 92 - 100 %    O2 Sat, Arterial 99.4 (H) 95.0 - 96.0 %    Peep 5 cm H2O    Narrative    In healthy individuals, oxyhemoglobin (O2Hb) and oxygen saturation (SO2) are approximately equal. In the presence of dyshemoglobins, oxyhemoglobin can be considerably lower than oxygen saturation.   CK total   Result Value Ref Range    CK 4,459 (HH) 39 - 308 U/L   CBC with platelets   Result Value Ref Range    WBC Count 28.1  (H) 4.0 - 11.0 10e3/uL    RBC Count 4.05 (L) 4.40 - 5.90 10e6/uL    Hemoglobin 11.1 (L) 13.3 - 17.7 g/dL    Hematocrit 33.7 (L) 40.0 - 53.0 %    MCV 83 78 - 100 fL    MCH 27.4 26.5 - 33.0 pg    MCHC 32.9 31.5 - 36.5 g/dL    RDW 14.5 10.0 - 15.0 %    Platelet Count 266 150 - 450 10e3/uL   Comprehensive metabolic panel   Result Value Ref Range    Sodium 146 (H) 135 - 145 mmol/L    Potassium 3.0 (L) 3.4 - 5.3 mmol/L    Carbon Dioxide (CO2) 17 (L) 22 - 29 mmol/L    Anion Gap 15 7 - 15 mmol/L    Urea Nitrogen 47.7 (H) 8.0 - 23.0 mg/dL    Creatinine 2.79 (H) 0.67 - 1.17 mg/dL    GFR Estimate 17 (L) >60 mL/min/1.73m2    Calcium 9.7 (H) 8.2 - 9.6 mg/dL    Chloride 114 (H) 98 - 107 mmol/L    Glucose 159 (H) 70 - 99 mg/dL    Alkaline Phosphatase 119 40 - 150 U/L     (H) 0 - 45 U/L    ALT 98 (H) 0 - 70 U/L    Protein Total 4.8 (L) 6.4 - 8.3 g/dL    Albumin 2.5 (L) 3.5 - 5.2 g/dL    Bilirubin Total 1.0 <=1.2 mg/dL   Calcium ionized whole blood   Result Value Ref Range    Calcium Ionized Whole Blood 5.6 (H) 4.4 - 5.2 mg/dL   Glucose whole blood   Result Value Ref Range    Glucose 149 (H) 70 - 99 mg/dL   Heparin Unfractionated Anti Xa Level   Result Value Ref Range    Anti Xa Unfractionated Heparin 0.12 For Reference Range, See Comment IU/mL    Narrative    Therapeutic Range: UFH: 0.25-0.50 IU/mL for low intensity dosing,  0.30-0.70 IU/mL for high intensity dosing DVT and PE.  This test is not validated for other direct factor X inhibitors (e.g. rivaroxaban, apixaban, edoxaban, betrixaban, fondaparinux) and should not be used for monitoring of other medications.   INR   Result Value Ref Range    INR 1.40 (H) 0.85 - 1.15   Partial thromboplastin time   Result Value Ref Range    aPTT 48 (H) 22 - 38 Seconds   Lactic acid whole blood   Result Value Ref Range    Lactic Acid 4.0 (HH) 0.7 - 2.0 mmol/L   Magnesium   Result Value Ref Range    Magnesium 3.0 (H) 1.7 - 2.3 mg/dL   Troponin T, High Sensitivity   Result Value Ref Range     Troponin T, High Sensitivity >10,000 (HH) <=22 ng/L   Glucose by meter   Result Value Ref Range    GLUCOSE BY METER POCT 156 (H) 70 - 99 mg/dL   Phosphorus   Result Value Ref Range    Phosphorus 2.6 2.5 - 4.5 mg/dL   Glucose by meter   Result Value Ref Range    GLUCOSE BY METER POCT 100 (H) 70 - 99 mg/dL   Blood gas arterial   Result Value Ref Range    pH Arterial 7.30 (L) 7.35 - 7.45    pCO2 Arterial 41 35 - 45 mm Hg    pO2 Arterial 82 80 - 105 mm Hg    FIO2 50     Bicarbonate Arterial 20 (L) 21 - 28 mmol/L    Base Excess/Deficit Arterial -5.9 (L) -3.0 - 3.0 mmol/L    Gm's Test Artline     Oxyhemoglobin Arterial 95 92 - 100 %    O2 Sat, Arterial 96.9 (H) 95.0 - 96.0 %    Peep 5 cm H2O    Narrative    In healthy individuals, oxyhemoglobin (O2Hb) and oxygen saturation (SO2) are approximately equal. In the presence of dyshemoglobins, oxyhemoglobin can be considerably lower than oxygen saturation.   Glucose by meter   Result Value Ref Range    GLUCOSE BY METER POCT 90 70 - 99 mg/dL   Activated clotting time celite, POCT   Result Value Ref Range    Activated Clotting Time (Celite) POCT 193 (H) 74 - 150 seconds   Glucose by meter   Result Value Ref Range    GLUCOSE BY METER POCT 86 70 - 99 mg/dL   Glucose by meter   Result Value Ref Range    GLUCOSE BY METER POCT 77 70 - 99 mg/dL   Blood gas arterial   Result Value Ref Range    pH Arterial 7.28 (L) 7.35 - 7.45    pCO2 Arterial 39 35 - 45 mm Hg    pO2 Arterial 100 80 - 105 mm Hg    FIO2 50     Bicarbonate Arterial 18 (L) 21 - 28 mmol/L    Base Excess/Deficit Arterial -7.9 (L) -3.0 - 3.0 mmol/L    Gm's Test Artline     Oxyhemoglobin Arterial 96 92 - 100 %    O2 Sat, Arterial 98.3 (H) 95.0 - 96.0 %    Peep 5 cm H2O    Narrative    In healthy individuals, oxyhemoglobin (O2Hb) and oxygen saturation (SO2) are approximately equal. In the presence of dyshemoglobins, oxyhemoglobin can be considerably lower than oxygen saturation.   XR Chest Port 1 View    Narrative     Exam: XR CHEST PORT 1 VIEW, 4/21/2024 2:07 AM    Comparison: Chest radiograph 4/20/2024    History: Check endotracheal tube placement and ECLS cannula placement.  DO NOT log-roll patient.  Place film under patient using patient  safety handling process.    Findings:  Portable AP view of the chest. Endotracheal tube projects over the  midthoracic trachea. Trachea is approximately midline. Cardiac  silhouette is within normal limits. Enteric tube extends below the  inferior margin of the image. Right-sided ECMO cannula terminates at  the mid SVC. Esophageal temperature probe in the esophagus. Slightly  reduced perihilar opacities, left greater than right. Small bilateral  pleural effusions. No appreciable pneumothorax or new focal  consolidation. No acute osseous abnormality.      Impression    Impression:   1. Endotracheal tube in the midthoracic trachea. Additional support  devices are stable.  2. Slightly decreased perihilar opacities, left greater than right,  favored to represent atelectasis and/or edema.  3. Small bilateral pleural effusions.    I have personally reviewed the examination and initial interpretation  and I agree with the findings.    MARTHA WANG MD         SYSTEM ID:  [id number]   CK total   Result Value Ref Range    CK 5,574 (HH) 39 - 308 U/L   CBC with platelets   Result Value Ref Range    WBC Count 31.2 (H) 4.0 - 11.0 10e3/uL    RBC Count 4.02 (L) 4.40 - 5.90 10e6/uL    Hemoglobin 11.3 (L) 13.3 - 17.7 g/dL    Hematocrit 33.6 (L) 40.0 - 53.0 %    MCV 84 78 - 100 fL    MCH 28.1 26.5 - 33.0 pg    MCHC 33.6 31.5 - 36.5 g/dL    RDW 14.6 10.0 - 15.0 %    Platelet Count 253 150 - 450 10e3/uL   Comprehensive metabolic panel   Result Value Ref Range    Sodium 148 (H) 135 - 145 mmol/L    Potassium 4.9 3.4 - 5.3 mmol/L    Carbon Dioxide (CO2) 16 (L) 22 - 29 mmol/L    Anion Gap 18 (H) 7 - 15 mmol/L    Urea Nitrogen 52.1 (H) 8.0 - 23.0 mg/dL    Creatinine 3.18 (H) 0.67 - 1.17 mg/dL    GFR Estimate 14 (L)  >60 mL/min/1.73m2    Calcium 9.7 (H) 8.2 - 9.6 mg/dL    Chloride 114 (H) 98 - 107 mmol/L    Glucose 85 70 - 99 mg/dL    Alkaline Phosphatase 122 40 - 150 U/L     (H) 0 - 45 U/L     (H) 0 - 70 U/L    Protein Total 5.1 (L) 6.4 - 8.3 g/dL    Albumin 2.6 (L) 3.5 - 5.2 g/dL    Bilirubin Total 1.1 <=1.2 mg/dL   Heparin Unfractionated Anti Xa Level   Result Value Ref Range    Anti Xa Unfractionated Heparin 0.32 For Reference Range, See Comment IU/mL    Narrative    Therapeutic Range: UFH: 0.25-0.50 IU/mL for low intensity dosing,  0.30-0.70 IU/mL for high intensity dosing DVT and PE.  This test is not validated for other direct factor X inhibitors (e.g. rivaroxaban, apixaban, edoxaban, betrixaban, fondaparinux) and should not be used for monitoring of other medications.   INR   Result Value Ref Range    INR 1.35 (H) 0.85 - 1.15   Partial thromboplastin time   Result Value Ref Range    aPTT 80 (H) 22 - 38 Seconds   Magnesium   Result Value Ref Range    Magnesium 3.1 (H) 1.7 - 2.3 mg/dL   Troponin T, High Sensitivity   Result Value Ref Range    Troponin T, High Sensitivity >10,000 (HH) <=22 ng/L   D dimer quantitative   Result Value Ref Range    D-Dimer Quantitative >20.00 (HH) 0.00 - 0.50 ug/mL FEU    Narrative    This D-dimer assay is intended for use in conjunction with a clinical pretest probability assessment model to exclude pulmonary embolism (PE) and deep venous thrombosis (DVT) in outpatients suspected of PE or DVT. The cut-off value is 0.50 ug/mL FEU.    For patients 50 years of age or older, the application of age-adjusted cut-off values for D-Dimer may increase the specificity without significant effect on sensitivity. The literature suggested calculation age adjusted cut-off in ug/L = age in years x 10 ug/L. The results in this laboratory are reported as ug/mL rather than ug/L. The calculation for age adjusted cut off in ug/mL= age in years x 0.01 ug/mL. For example, the cut off for a 76 year old  male is 77 x 0.01 ug/mL = 0.76 ug/mL (760 ug/L).    M Taylor et al. Age adjusted D-dimer cut-off levels to rule out pulmonary embolism: The ADJUST-PE Study. MICHAEL 2014;311:3674-8178.; HJ Nino et al. Diagnostic accuracy of conventional or age adjusted D-dimer cutoff values in older patients with suspected venous thromboembolism. Systemic review and meta-analysis. BMJ 2013:346:f2492.   Blood gas ELS venous   Result Value Ref Range    pH ELS Venous 7.18 (LL) 7.32 - 7.43    pCO2 ELS Venous 51 (H) 40 - 50 mm Hg    pO2 ELS Venous 40 25 - 47 mm Hg    Bicarbonate ELS Venous 19 (L) 21 - 28 mmol/L    Base Excess/Deficit Venous -9.1 (L) -3.0 - 3.0 mmol/L    FIO2 50     Oxyhemoglobin ELS Venous 67 %    O2 Saturation ELS Venous 68.1 (L) 70.0 - 75.0 %    Narrative    In healthy individuals, oxyhemoglobin (O2Hb) and oxygen saturation (SO2) are approximately equal. In the presence of dyshemoglobins, oxyhemoglobin can be considerably lower than oxygen saturation.   Blood gas ELS arterial   Result Value Ref Range    pH ELS Arterial 7.22 (L) 7.35 - 7.45    pCO2 ELS Arterial 43 35 - 45 mm Hg    pO2 ELS Arterial 102 80 - 105 mm Hg    Bicarbonate ELS Arterial 18 (L) 21 - 28 mmol/L    Base Excess/Deficit Arterial -9.8 (L) -3.0 - 3.0 mmol/L    FIO2 50     Oxyhemoglobin ELS Arterial 96 75 - 100 %    O2 Saturation ELS Arterial 97.9 (H) 95.0 - 96.0 %    Narrative    In healthy individuals, oxyhemoglobin (O2Hb) and oxygen saturation (SO2) are approximately equal. In the presence of dyshemoglobins, oxyhemoglobin can be considerably lower than oxygen saturation.   Fibrinogen activity   Result Value Ref Range    Fibrinogen Activity 485 170 - 490 mg/dL   Antithrombin III   Result Value Ref Range    Antithrombin III 86 85 - 135 %   CRP inflammation   Result Value Ref Range    CRP Inflammation 72.70 (H) <5.00 mg/L   Erythrocyte sedimentation rate auto   Result Value Ref Range    Erythrocyte Sedimentation Rate 28 (H) 0 - 20 mm/hr   Hemoglobin  plasma   Result Value Ref Range    Hemoglobin Plasma 30 (H) <30 mg/dL   Lactate Dehydrogenase   Result Value Ref Range    Lactate Dehydrogenase 958 (H) 0 - 250 U/L   Phosphorus   Result Value Ref Range    Phosphorus 7.5 (H) 2.5 - 4.5 mg/dL   Glucose by meter   Result Value Ref Range    GLUCOSE BY METER POCT 83 70 - 99 mg/dL   Blood gas arterial   Result Value Ref Range    pH Arterial 7.22 (L) 7.35 - 7.45    pCO2 Arterial 42 35 - 45 mm Hg    pO2 Arterial 96 80 - 105 mm Hg    FIO2 50     Bicarbonate Arterial 17 (L) 21 - 28 mmol/L    Base Excess/Deficit Arterial -9.8 (L) -3.0 - 3.0 mmol/L    Gm's Test Artline     Oxyhemoglobin Arterial 96 92 - 100 %    O2 Sat, Arterial 97.6 (H) 95.0 - 96.0 %    Peep 5 cm H2O    Narrative    In healthy individuals, oxyhemoglobin (O2Hb) and oxygen saturation (SO2) are approximately equal. In the presence of dyshemoglobins, oxyhemoglobin can be considerably lower than oxygen saturation.   Calcium ionized whole blood   Result Value Ref Range    Calcium Ionized Whole Blood 5.3 (H) 4.4 - 5.2 mg/dL   Glucose whole blood   Result Value Ref Range    Glucose 78 70 - 99 mg/dL   Lactic acid whole blood   Result Value Ref Range    Lactic Acid 4.5 (HH) 0.7 - 2.0 mmol/L   Activated clotting time celite, POCT   Result Value Ref Range    Activated Clotting Time (Celite) POCT 197 (H) 74 - 150 seconds   Glucose by meter   Result Value Ref Range    GLUCOSE BY METER POCT 58 (L) 70 - 99 mg/dL   Extra Tube    Narrative    The following orders were created for panel order Extra Tube.  Procedure                               Abnormality         Status                     ---------                               -----------         ------                     Extra Blood Culture Bottle[[id number]]                       Final result                 Please view results for these tests on the individual orders.   Extra Blood Culture Bottle   Result Value Ref Range    Hold Specimen JIC    Extra Tube    Narrative     The following orders were created for panel order Extra Tube.  Procedure                               Abnormality         Status                     ---------                               -----------         ------                     Extra Blood Culture Bottle[[id number]]                       Final result                 Please view results for these tests on the individual orders.   Extra Blood Culture Bottle   Result Value Ref Range    Hold Specimen JIC    Glucose by meter   Result Value Ref Range    GLUCOSE BY METER POCT 168 (H) 70 - 99 mg/dL   Respiratory Aerobic Bacterial Culture    Specimen: Endotracheal; Sputum   Result Value Ref Range    Gram Stain Result >25 PMNs/low power field (A)     Gram Stain Result 4+ Gram positive cocci (A)    Glucose by meter   Result Value Ref Range    GLUCOSE BY METER POCT 141 (H) 70 - 99 mg/dL   Blood gas arterial   Result Value Ref Range    pH Arterial 7.20 (L) 7.35 - 7.45    pCO2 Arterial 47 (H) 35 - 45 mm Hg    pO2 Arterial 74 (L) 80 - 105 mm Hg    FIO2 50     Bicarbonate Arterial 18 (L) 21 - 28 mmol/L    Base Excess/Deficit Arterial -9.7 (L) -3.0 - 3.0 mmol/L    Gm's Test Artline     Oxyhemoglobin Arterial 92 92 - 100 %    O2 Sat, Arterial 93.7 (L) 95.0 - 96.0 %    Peep 5 cm H2O    Lactic Acid 6.7 (HH) 0.7 - 2.0 mmol/L    Narrative    In healthy individuals, oxyhemoglobin (O2Hb) and oxygen saturation (SO2) are approximately equal. In the presence of dyshemoglobins, oxyhemoglobin can be considerably lower than oxygen saturation.   EKG 12-lead, tracing only   Result Value Ref Range    Systolic Blood Pressure  mmHg    Diastolic Blood Pressure  mmHg    Ventricular Rate 115 BPM    Atrial Rate 115 BPM    OK Interval 122 ms    QRS Duration 86 ms     ms    QTc 473 ms    P Axis 60 degrees    R AXIS 95 degrees    T Axis 70 degrees    Interpretation ECG       Sinus tachycardia  Rightward axis  Anterior infarct , age undetermined  Abnormal ECG  When compared with ECG of  20-APR-2024 13:25, (unconfirmed)  ST no longer elevated in Inferior leads  Nonspecific T wave abnormality has replaced inverted T waves in Inferior leads     Glucose by meter   Result Value Ref Range    GLUCOSE BY METER POCT 105 (H) 70 - 99 mg/dL   Blood gas arterial   Result Value Ref Range    pH Arterial 7.26 (L) 7.35 - 7.45    pCO2 Arterial 46 (H) 35 - 45 mm Hg    pO2 Arterial 68 (L) 80 - 105 mm Hg    FIO2 50     Bicarbonate Arterial 21 21 - 28 mmol/L    Base Excess/Deficit Arterial -6.5 (L) -3.0 - 3.0 mmol/L    Gm's Test Artline     Oxyhemoglobin Arterial 91 (L) 92 - 100 %    O2 Sat, Arterial 92.7 (L) 95.0 - 96.0 %    Peep 5 cm H2O    Lactic Acid 6.8 (HH) 0.7 - 2.0 mmol/L    Narrative    In healthy individuals, oxyhemoglobin (O2Hb) and oxygen saturation (SO2) are approximately equal. In the presence of dyshemoglobins, oxyhemoglobin can be considerably lower than oxygen saturation.   Glucose by meter   Result Value Ref Range    GLUCOSE BY METER POCT 94 70 - 99 mg/dL   Blood gas arterial   Result Value Ref Range    pH Arterial 7.45 7.35 - 7.45    pCO2 Arterial 40 35 - 45 mm Hg    pO2 Arterial 127 (H) 80 - 105 mm Hg    FIO2 50     Bicarbonate Arterial 28 21 - 28 mmol/L    Base Excess/Deficit Arterial 3.5 (H) -3.0 - 3.0 mmol/L    Gm's Test Artline     Oxyhemoglobin Arterial 98 92 - 100 %    O2 Sat, Arterial 100.0 (H) 95.0 - 96.0 %    Lactic Acid 5.9 (HH) 0.7 - 2.0 mmol/L    Narrative    In healthy individuals, oxyhemoglobin (O2Hb) and oxygen saturation (SO2) are approximately equal. In the presence of dyshemoglobins, oxyhemoglobin can be considerably lower than oxygen saturation.   Glucose by meter   Result Value Ref Range    GLUCOSE BY METER POCT 85 70 - 99 mg/dL   Activated clotting time celite, POCT   Result Value Ref Range    Activated Clotting Time (Celite) POCT 209 (H) 74 - 150 seconds   TEG with Platelet Inhibition   Result Value Ref Range    Platelet Inhibition with  %    Platelet Inhibition  with AA 99 %    Narrative    Platelet Mapping is intended to assess platelet function   in patients who have received antiplatelet therapy, such as aspirin,   clopidogrel, abciximab, etc. Results are reported in a range of 0 to 100% inhibition. A high value indicates a response to the antiplatelet therapy.   TEG with Heparinase   Result Value Ref Range    R (Time until clot forms) 9.2 5.0 - 10.0 Minute    K ( Time to Spec. clot strength) 2.0 1.0 - 3.0 Minute    Angle (Rate of Clot Growth) 63.8 53.0 - 72.0 Degrees    MA ( Maximum Clot Strength) 68.3 50.0 - 70.0 mm    CI (coagulation index) -1.3 -3.0 - 3.0    G (actual clot strength) 10.8 4.5 - 11.0 Kd/sc    LY30 (lysis at 30 minutes) 0.0 0.0 - 8.0 %    LY60 (lysis at 60 minutes) 0.8 0.0 - 15.0 %   Glucose by meter   Result Value Ref Range    GLUCOSE BY METER POCT 78 70 - 99 mg/dL   Echocardiogram Complete   Result Value Ref Range    LVEF  5-10% (severely reduced)     [facility]    [id number]  [id number]  [id number]  [id number]^BETHANY^DEEPTI^MARGARITA     [facility],[place]  Echocardiography Laboratory  [address] [place] [address]     Name: DEANNA ALMEIDA  MRN: [id number]  : 1960  Study Date: 2024 08:27 AM  Age: 63 yrs  Gender: Male  Patient Location: [facility]  Reason For Study: Cardiac Arrest  Ordering Physician: DEEPTI SIMS  Performed By: aCty Reid RDCS     BSA: 1.5 m2  Height: 67 in  Weight: 98 lb  ______________________________________________________________________________  Procedure  Echocardiogram with two-dimensional, color and spectral Doppler performed.  ______________________________________________________________________________  Interpretation Summary  Patient on [facility] ECMO at 4.3 LPM.     Left ventricular function is severely reduced. The ejection fraction is 5-10%.  Global right ventricular function is severely reduced.  Severe global hypokinesis with sparing of the RV and LV apex. Consider  pulmonary  embolism. Could also be a form of stress cardiomyopathy.  No pericardial effusion.  There is no prior study for direct comparison.  ______________________________________________________________________________  Left Ventricle  Left ventricular size is normal. Left ventricular wall thickness cannot  evaluate. Left ventricular function is decreased. The ejection fraction is 5-  10% (severely reduced). Left ventricular diastolic function is not assessable.  Severe diffuse hypokinesis is present.     Right Ventricle  Global right ventricular function is severely reduced.     Atria  The atria cannot be assessed.     Mitral Valve  Mild mitral annular calcification is present. Trace mitral insufficiency is  present.     Aortic Valve  Aortic valve opens with each cardiac cycle.     Tricuspid Valve  The tricuspid valve is normal. The peak velocity of the tricuspid regurgitant  jet is not obtainable. Pulmonary artery systolic pressure cannot be assessed.     Vessels  ECMO cannula is present in the IVC.     Pericardium  No pericardial effusion is present.     Compared to Previous Study  There is no prior study for direct comparison.  ______________________________________________________________________________  Report approved by: Yany Licona 04/21/2024 11:17 AM     ______________________________________________________________________________      Blood gas arterial   Result Value Ref Range    pH Arterial 7.64 (HH) 7.35 - 7.45    pCO2 Arterial 38 35 - 45 mm Hg    pO2 Arterial 97 80 - 105 mm Hg    FIO2 50     Bicarbonate Arterial 41 (H) 21 - 28 mmol/L    Base Excess/Deficit Arterial 18.2 (H) -3.0 - 3.0 mmol/L    Gm's Test Artline     Oxyhemoglobin Arterial 97 92 - 100 %    O2 Sat, Arterial 99.6 (H) 96.0 - 97.0 %    Narrative    In healthy individuals, oxyhemoglobin (O2Hb) and oxygen saturation (SO2) are approximately equal. In the presence of dyshemoglobins, oxyhemoglobin can be considerably lower than oxygen  saturation.   CK total   Result Value Ref Range    CK 6,551 (HH) 39 - 308 U/L   CBC with platelets   Result Value Ref Range    WBC Count 30.4 (H) 4.0 - 11.0 10e3/uL    RBC Count 3.20 (L) 4.40 - 5.90 10e6/uL    Hemoglobin 8.9 (L) 13.3 - 17.7 g/dL    Hematocrit 25.5 (L) 40.0 - 53.0 %    MCV 80 78 - 100 fL    MCH 27.8 26.5 - 33.0 pg    MCHC 34.9 31.5 - 36.5 g/dL    RDW 14.6 10.0 - 15.0 %    Platelet Count 163 150 - 450 10e3/uL   Calcium ionized whole blood   Result Value Ref Range    Calcium Ionized Whole Blood 4.7 4.4 - 5.2 mg/dL   Glucose whole blood   Result Value Ref Range    Glucose 67 (L) 70 - 99 mg/dL   Heparin Unfractionated Anti Xa Level   Result Value Ref Range    Anti Xa Unfractionated Heparin <0.10 For Reference Range, See Comment IU/mL    Narrative    Therapeutic Range: UFH: 0.25-0.50 IU/mL for low intensity dosing,  0.30-0.70 IU/mL for high intensity dosing DVT and PE.  This test is not validated for other direct factor X inhibitors (e.g. rivaroxaban, apixaban, edoxaban, betrixaban, fondaparinux) and should not be used for monitoring of other medications.   INR   Result Value Ref Range    INR 1.78 (H) 0.85 - 1.15   Partial thromboplastin time   Result Value Ref Range    aPTT 59 (H) 22 - 38 Seconds   Lactic acid whole blood   Result Value Ref Range    Lactic Acid 6.7 (HH) 0.7 - 2.0 mmol/L   Magnesium   Result Value Ref Range    Magnesium 2.7 (H) 1.7 - 2.3 mg/dL   Troponin T, High Sensitivity   Result Value Ref Range    Troponin T, High Sensitivity >10,000 (HH) <=22 ng/L   Glucose by meter   Result Value Ref Range    GLUCOSE BY METER POCT 69 (L) 70 - 99 mg/dL   Glucose by meter   Result Value Ref Range    GLUCOSE BY METER POCT 127 (H) 70 - 99 mg/dL   CBC with platelets   Result Value Ref Range    WBC Count 31.6 (H) 4.0 - 11.0 10e3/uL    RBC Count 3.35 (L) 4.40 - 5.90 10e6/uL    Hemoglobin 9.6 (L) 13.3 - 17.7 g/dL    Hematocrit 26.9 (L) 40.0 - 53.0 %    MCV 80 78 - 100 fL    MCH 28.7 26.5 - 33.0 pg    MCHC  35.7 31.5 - 36.5 g/dL    RDW 14.6 10.0 - 15.0 %    Platelet Count 163 150 - 450 10e3/uL   Blood gas arterial   Result Value Ref Range    pH Arterial 7.55 (H) 7.35 - 7.45    pCO2 Arterial 44 35 - 45 mm Hg    pO2 Arterial 49 (L) 80 - 105 mm Hg    FIO2 50     Bicarbonate Arterial 38 (H) 21 - 28 mmol/L    Base Excess/Deficit Arterial 14.4 (H) -3.0 - 3.0 mmol/L    Gm's Test Artline     Oxyhemoglobin Arterial 87 (L) 92 - 100 %    O2 Sat, Arterial 88.8 (L) 96.0 - 97.0 %    Lactic Acid 9.8 (HH) 0.7 - 2.0 mmol/L    Narrative    In healthy individuals, oxyhemoglobin (O2Hb) and oxygen saturation (SO2) are approximately equal. In the presence of dyshemoglobins, oxyhemoglobin can be considerably lower than oxygen saturation.   Glucose by meter   Result Value Ref Range    GLUCOSE BY METER POCT 98 70 - 99 mg/dL   Comprehensive metabolic panel   Result Value Ref Range    Sodium 167 (HH) 135 - 145 mmol/L    Potassium 4.3 3.4 - 5.3 mmol/L    Carbon Dioxide (CO2) 34 (H) 22 - 29 mmol/L    Anion Gap 21 (H) 7 - 15 mmol/L    Urea Nitrogen 61.4 (H) 8.0 - 23.0 mg/dL    Creatinine 3.70 (H) 0.67 - 1.17 mg/dL    GFR Estimate 18 (L) >60 mL/min/1.73m2    Calcium 9.3 8.8 - 10.2 mg/dL    Chloride 112 (H) 98 - 107 mmol/L    Glucose 103 (H) 70 - 99 mg/dL    Alkaline Phosphatase 96 40 - 150 U/L    AST 2,984 (HH) 0 - 45 U/L    ALT 1,278 (HH) 0 - 70 U/L    Protein Total 3.9 (L) 6.4 - 8.3 g/dL    Albumin 2.0 (L) 3.5 - 5.2 g/dL    Bilirubin Total 1.5 (H) <=1.2 mg/dL   Glucose by meter   Result Value Ref Range    GLUCOSE BY METER POCT 71 70 - 99 mg/dL   CBC with platelets   Result Value Ref Range    WBC Count 32.2 (H) 4.0 - 11.0 10e3/uL    RBC Count 3.26 (L) 4.40 - 5.90 10e6/uL    Hemoglobin 9.2 (L) 13.3 - 17.7 g/dL    Hematocrit 27.3 (L) 40.0 - 53.0 %    MCV 84 78 - 100 fL    MCH 28.2 26.5 - 33.0 pg    MCHC 33.7 31.5 - 36.5 g/dL    RDW 14.9 10.0 - 15.0 %    Platelet Count 164 150 - 450 10e3/uL       All relevant imaging and laboratory values  personally reviewed.

## 2024-04-21 NOTE — CONSULTS
Nephrology Initial Consult  April 21, 2024      Elie Prado MRN:[id number] YOB: 1960  Date of Admission:4/20/2024  Primary care provider: Fermin Whitney  Requesting physician: Moses Cheng*    ASSESSMENT AND RECOMMENDATIONS:    Patient is 64 year old male who presented to OSH following cardiac arrest 2/2 refractory VF cardiac arrest requiring V-A ECMO.  angiogram revealed RCA culprit lesion s/p stent placement. Nephrology consulted for JOAN.     Anuric JOAN 2/2 to cardiogenic shock  Baseline cr is unclear. He presented to OSH with cr 2.6 and has been trending up. Currently Cr 3.18. etiology of JOAN is secondary to severe hypotension d/t cardiogenic shock. He might also had CKD given CT scan below.  CT: Atrophic appearance of the left kidney. Fetal lobulation of the right kidney. Scattered simple cysts bilaterally. No hydronephrosis   UA : WBC >180, no bacteria or nitrate,   RBC 2  w/o casts. Urine culture pending.      -Emergent CRRT initiation due to electrolytes derangement and metabolic acidosis.   -Consent obtain from spouse   - Avoid nephrotoxins    Severe hypernatremia   On presentation . He received large NA-bicarb infusion to correct acidosis which led to iatrogenic hypernatremia. Potassium is normal but it's expected to rise.   --Starting CRRT          Anemia :  Hgb 10-11 on presentation and it's trending down likely due to acute blood loss or hemodilution or both.   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 Truong Tapia MD   Division of Renal Disease and Hypertension  [facility]  [email or web address]  Vocera Web Console      REASON FOR CONSULT: JOAN-D     HISTORY OF PRESENT ILLNESS:  Admitting provider and nursing notes  reviewed   Patient is 64 year old male who presented to OSH following cardiac arrest 2/2 refractory VF cardiac arrest requiring V-A ECMO.  angiogram revealed RCA culprit lesion s/p stent placement. Nephrology consulted for JOAN.       Baseline cr is unclear. He presented to OSH with cr 2.6 and has been trending up. Currently Cr 3.18.  He might had CKD at baseline.  CT abd shows; Atrophic left kidney and fetal lobulation of the right kidney and scattered simple cysts bilaterally. The etiology of JOAN is secondary to severe hypotension d/t cardiogenic shock.    PAST MEDICAL HISTORY:    No past medical history on file.    No past surgical history on file.     MEDICATIONS:  PTA Meds  Prior to Admission medications    Not on File      Current Meds  Current Facility-Administered Medications   Medication Dose Route Frequency Provider Last Rate Last Admin    aspirin (ASA) chewable tablet 81 mg  81 mg Per Feeding Tube Daily Fela Burris APRN CNP   81 mg at 04/21/24 0827    cefTRIAXone (ROCEPHIN) 2 g vial to attach to  ml bag for ADULTS or NS 50 ml bag for PEDS  2 g Intravenous Q24H Fela Burris APRN CNP   2 g at 04/20/24 1424    chlorhexidine (PERIDEX) 0.12 % solution 15 mL  15 mL Swish & Spit BID Fela Burris APRN CNP   15 mL at 04/21/24 0833    pantoprazole (PROTONIX) IV push injection 40 mg  40 mg Intravenous Daily Fela Burris APRN CNP   40 mg at 04/21/24 0833    ticagrelor (BRILINTA) tablet 90 mg  90 mg Oral BID Fela Burris APRN CNP   90 mg at 04/21/24 0827    vancomycin place martinez - receiving intermittent dosing  1 each Intravenous See Admin Instructions Sydni Cheng MD         Infusion Meds  Current Facility-Administered Medications   Medication Dose Route Frequency Provider Last Rate Last Admin    amiodarone (NEXTERONE) 6 mg/mL in sodium chloride 0.9% in non-PVC container 250 mL MAX concentration CENTRAL line infusion  1 mg/min Intravenous Continuous Roger  Sydni Cantor MD 10 mL/hr at 04/21/24 1000 1 mg/min at 04/21/24 1000    dextrose 10% infusion   Intravenous Continuous PRN Fela Burris APRN CNP        dextrose 5% infusion   Intravenous Continuous Fela Burris APRN CNP        EPINEPHrine (ADRENALIN) 5 mg in sodium chloride 0.9 % 250 mL infusion CENTRAL  0.03-0.3 mcg/kg/min (Dosing Weight) Intravenous Continuous Fela Burris APRN CNP   Stopped at 04/21/24 0945    fentaNYL (SUBLIMAZE) infusion   mcg/hr Intravenous Continuous Fela Burris APRN CNP   Stopped at 04/20/24 1715    heparin (porcine) 100 unit/mL in 0.45% Sodium Chloride ANTICOAGULANT infusion  10-50 Units/kg/hr (Ideal) Other Continuous Duran Sydni Cantor MD 5.5 mL/hr at 04/21/24 1000 550 Units/hr at 04/21/24 1000    heparin 2 unit/mL in 0.9% NaCl (for REPERFUSION CATHETER)  3 mL/hr Intravenous Continuous Fela Burris APRN CNP   Held at 04/20/24 1429    insulin regular (MYXREDLIN) 1 unit/mL infusion  0-24 Units/hr Intravenous Continuous Fela Burris APRN CNP   Stopped at 04/20/24 2302    midazolam (VERSED) 100 mg/100 mL NS infusion - ADULT  1-8 mg/hr Intravenous Continuous Johnnie Mccrary MD   Stopped at 04/20/24 1715    norepinephrine (LEVOPHED) 16 mg in  mL infusion MAX CONC CENTRAL LINE  0.01-0.6 mcg/kg/min (Dosing Weight) Intravenous Continuous Fela Burris APRN CNP   Stopped at 04/21/24 0520    sodium bicarbonate (BICARB) 1 mEq/mL drip  150 mEq/hr Intravenous Continuous Nhi Samuel  mL/hr at 04/21/24 1013 150 mEq/hr at 04/21/24 1013    vasopressin 1 unit/mL MAX Conc (PITRESSIN) infusion  0.5-4 Units/hr Intravenous Continuous Fela Burris APRN CNP   Stopped at 04/21/24 0520       ALLERGIES:    Not on File    REVIEW OF SYSTEMS:  A comprehensive of systems was negative except as noted above.    SOCIAL HISTORY:   Social History     Socioeconomic History    Marital status:      Spouse name: Not on file  "   Number of children: Not on file    Years of education: Not on file    Highest education level: Not on file   Occupational History    Not on file   Tobacco Use    Smoking status: Not on file    Smokeless tobacco: Not on file   Substance and Sexual Activity    Alcohol use: Not on file    Drug use: Not on file    Sexual activity: Not on file   Other Topics Concern    Not on file   Social History Narrative    Not on file     Social Determinants of Health     Financial Resource Strain: Not on file   Food Insecurity: Not on file   Transportation Needs: Not on file   Physical Activity: Not on file   Stress: Not on file   Social Connections: Not on file   Interpersonal Safety: Not on file   Housing Stability: Not on file       family accompanies Elie Prado in hospital room    FAMILY MEDICAL HISTORY:   No family history on file.  Unknown Family history of kidney disease    PHYSICAL EXAM:   Temp  Av.1  F (36.7  C)  Min: 93  F (33.9  C)  Max: 98.6  F (37  C)  Arterial Line BP  Min: 62/54  Max: 133/84  Arterial Line MAP (mmHg)  Av.1 mmHg  Min: 29 mmHg  Max: 347 mmHg      Pulse  Av.5  Min: 99  Max: 136 Resp  Av.5  Min: 10  Max: 38  FiO2 (%)  Av.6 %  Min: 50 %  Max: 60 %  SpO2  Av.3 %  Min: 74 %  Max: 100 %       Pulse 112   Temp 98.6  F (37  C)   Resp 10   Ht 1.702 m (5' 7\")   Wt 44.7 kg (98 lb 8.7 oz)   SpO2 100%   BMI 15.43 kg/m     Date 24 0700 - 24 0659   Shift 4595-0978 7185-3554 5820-0368 24 Hour Total   INTAKE   I.V. 638.71   638.71   NG/   100   Shift Total(mL/kg) 738.71(16.53)   738.71(16.53)   OUTPUT   Urine 25   25   Emesis/NG output 100   100   Stool 225   225   Shift Total(mL/kg) 350(7.83)   350(7.83)   Weight (kg) 44.7 44.7 44.7 44.7      Admit Weight: 46.8 kg (103 lb 2.8 oz)     GENERAL APPEARANCE: intubated  EYES: no scleral icterus, pupils equal  Lymphatics: no cervical or supraclavicular LAD  Pulmonary: lungs clear to auscultation with equal breath " sounds bilaterally, no clubbing  CV: regular rhythm, normal rate, no rub   - JVD -ve   - Edema +  GI: soft, nontender, normal bowel sounds  MS: no evidence of inflammation in joints, no muscle tenderness  :  almanza  SKIN: no rash, warm, dry, no cyanosis  NEURO: face symmetric, intubated    LABS:   I have reviewed the following labs:  CMP  Recent Labs   Lab 04/21/24  0956 04/21/24  0950 04/21/24  0854 04/21/24  0745 04/21/24  0301 04/21/24  0256 04/20/24  2301 04/20/24  2202 04/20/24  1641 04/20/24  1640 04/20/24  1308 04/20/24  1306   NA  --   --   --   --   --  148*  --  146*  --  144  --  144   POTASSIUM  --   --   --   --   --  4.9  --  3.0*  --  3.9  --  3.9   CHLORIDE  --   --   --   --   --  114*  --  114*  --  112*  --  108*   CO2  --   --   --   --   --  16*  --  17*  --  18*  --  14*   ANIONGAP  --   --   --   --   --  18*  --  15  --  14  --  22*   GLC 69* 67* 78 85   < > 85   < > 149*  156*  159*   < > 219*   < > 192*   BUN  --   --   --   --   --  52.1*  --  47.7*  --  44.9*  --  44.7*   CR  --   --   --   --   --  3.18*  --  2.79*  --  2.64*  --  2.60*   GFRESTIMATED  --   --   --   --   --  14*  --  17*  --  18*  --  18*   IBAN  --   --   --   --   --  9.7*  --  9.7*  --  9.0  --  9.0   MAG  --   --   --   --   --  3.1*  --  3.0*  --  1.3*  --   --    PHOS  --   --   --   --   --  7.5*  --  2.6  --  4.3  --   --    PROTTOTAL  --   --   --   --   --  5.1*  --  4.8*  --  4.7*  --  4.4*   ALBUMIN  --   --   --   --   --  2.6*  --  2.5*  --  2.6*  --  2.3*   BILITOTAL  --   --   --   --   --  1.1  --  1.0  --  0.8  --  0.5   ALKPHOS  --   --   --   --   --  122  --  119  --  128  --  150   AST  --   --   --   --   --  368*  --  299*  --  266*  --  191*   ALT  --   --   --   --   --  125*  --  98*  --  89*  --  72*    < > = values in this interval not displayed.     CBC  Recent Labs   Lab 04/21/24  0256 04/20/24  2202 04/20/24  1640 04/20/24  1306   HGB 11.3* 11.1* 11.0* 10.8*   WBC 31.2* 28.1* 31.9*  23.9*   RBC 4.02* 4.05* 3.90* 3.85*   HCT 33.6* 33.7* 33.2* 33.0*   MCV 84 83 85 86   MCH 28.1 27.4 28.2 28.1   MCHC 33.6 32.9 33.1 32.7   RDW 14.6 14.5 14.8 15.3*    266 265 242     INR  Recent Labs   Lab 04/21/24  0256 04/20/24  2202 04/20/24  1640   INR 1.35* 1.40* 1.48*   PTT 80* 48* 97*     ABG  Recent Labs   Lab 04/21/24  0741 04/21/24  0559 04/21/24  0505 04/21/24  0303   PH 7.45 7.26* 7.20* 7.22*   PCO2 40 46* 47* 42   PO2 127* 68* 74* 96   HCO3 28 21 18* 17*   O2PER 50 50 50 50      URINE STUDIES  Recent Labs   Lab Test 04/20/24  1408   COLOR Straw   APPEARANCE Slightly Cloudy*   URINEGLC 30*   URINEBILI Negative   URINEKETONE Negative   SG 1.012   UBLD Moderate*   URINEPH 6.0   PROTEIN 70*   NITRITE Negative   LEUKEST Large*   RBCU 2   WBCU >182*     No lab results found.  PTH  No lab results found.  IRON STUDIES  No lab results found.    IMAGING:  All imaging studies reviewed by me.     Gerri Tapia MD

## 2024-04-21 NOTE — CONSULTS
Vascular Surgery Consult  2024    Elie Prado  : 1960    Date of Service: 2024 10:10 AM    Reason for Consult: RLE ischemia in setting of VA ECMO cannulats    Assessment and Plan:  Elie Prado is a 63 year old male with PMH of CKD and opioid dependence who  presented to hospital with refractory V fib arrest found to be 2/2 RCA lesion now s/p PCI, who is currently on VA ECMO via R femoral artery (16fr) and right femoral vein. Per report, no distal reperfusion catheter was able to placed secondary to SFA occlusion. Vascular surgery consulted for RLE acute limb ischemia. Patient intubated, sedated on exam, no motor/sensory exam able to be obtained. RLE with duskiness of entire lower leg, mottling to level of mid thigh. Ankle flexion limited by stiffness. No doppler signals in RLE at all. Monophasic doppler signal LLE popliteal artery. Labs notable for leukocytosis to 31,  pH 7.26, K of 4.9, Cr of 3.18. RLE duplex from 3pm  demonstrates no flow distal to the SFA in the RLE. LLE also with poor perfusion distally, no waveform on us with velocities of 19cm/s in the PT. Appears well perfused however.  Patient has been without perfusion in the RLE since cannulation, at this point RLE is not salvageable. Anticipate patient will require AKA. No urgent indication for amputation at this time, will allow RLE to demarcate and monitor patient clinical status.     - No urgent indication for amputation at this time, will continue to follow along with you  - Recommend heparin drip - increase to true therapeutic high intensity dosing when able  - Bairhugger on at all times to warm legs    Seen and discussed with Dr. Kena Cortes, who is in agreement with the above.    Wilian Carvalho MD  Surgery PGY3    History of Present Illness:    Elie Prado is a 63 year old male that presents with with PMH of CKD and opioid dependence who  presented to hospital with refractory V fib arrest found to be 2/2  "RCA lesion now s/p PCI, who is currently on VA ECMO via R femoral artery (16fr) and right femoral vein.     Per chart review 4/20 am he reported not feeling well and leaving a store, and his brother went to go check on him in the car 2 minutes later and found him unresponsive. CPR was started immediately,  EMS arrived 6 minutes later. Underwent total 40 minutes CPR. Received 6 shocks. Cannulated on VA ECMO, per report distal reperfusion catheter was unable to be placed 2/2 SFA occlusion. Underwent emergent heart cath which revealed RCA lesion which was thus stented.         Past Medical History:  No past medical history on file.    Past Surgical History  No past surgical history on file.    Family History:  No family history on file.    Social History:  Social History     Socioeconomic History    Marital status:      Spouse name: Not on file    Number of children: Not on file    Years of education: Not on file    Highest education level: Not on file   Occupational History    Not on file   Tobacco Use    Smoking status: Not on file    Smokeless tobacco: Not on file   Substance and Sexual Activity    Alcohol use: Not on file    Drug use: Not on file    Sexual activity: Not on file   Other Topics Concern    Not on file   Social History Narrative    Not on file     Social Determinants of Health     Financial Resource Strain: Not on file   Food Insecurity: Not on file   Transportation Needs: Not on file   Physical Activity: Not on file   Stress: Not on file   Social Connections: Not on file   Interpersonal Safety: Not on file   Housing Stability: Not on file       Medications:  No current outpatient medications on file.       Allergies:   Not on File    Review of Symptoms:  A 10 point review of symptoms has been conducted and is negative except for that mentioned in the above HPI.    Physical Exam:    Pulse 112, temperature 98.6  F (37  C), resp. rate 10, height 1.702 m (5' 7\"), weight 44.7 kg (98 lb 8.7 oz), SpO2 " 96%.  Gen:    Intubated, sedated.   HEENT: Normocephalic and atraumatic  CV:  On VA ECMO  Pulm:  Non-labored breathing via vent  Abd:  Soft, non-tender, non-distended  Ext:  RLE with duskiness from level of toes all the way to mid thigh where there is more mottling, however distally entire lower leg is dusky, no cap refill or blanching. Right ankle joint does not flex/extend easily.     LLE warm, well perfused appearing  Vasc:   No doppler signals in any vessel in the RLE, unable to listen at femorals 2/2 cannulas.     LLE with monophasic popliteal doppler signal, no pedal signals.     Labs:  CBC RESULTS:   Recent Labs   Lab Test 04/21/24  0256   WBC 31.2*   RBC 4.02*   HGB 11.3*   HCT 33.6*   MCV 84   MCH 28.1   MCHC 33.6   RDW 14.6        Last Basic Metabolic Panel:  Lab Results   Component Value Date     04/21/2024      Lab Results   Component Value Date    POTASSIUM 4.9 04/21/2024    POTASSIUM 2.6 04/20/2024     Lab Results   Component Value Date    CHLORIDE 114 04/21/2024     Lab Results   Component Value Date    IBAN 9.7 04/21/2024     Lab Results   Component Value Date    CO2 16 04/21/2024     Lab Results   Component Value Date    BUN 52.1 04/21/2024     Lab Results   Component Value Date    CR 3.18 04/21/2024     Lab Results   Component Value Date    GLC 69 04/21/2024    GLC 78 04/21/2024       Imaging:  CT Chest Abdomen Pelvis w/o Contrast    Result Date: 4/21/2024  EXAMINATION: CT CHEST ABDOMEN PELVIS W/O CONTRAST, 4/20/2024 1:26 PM INDICATION: out of hospital cardiac arrest with MARGUERITE, looking for bleeding or any other pathology COMPARISON STUDY: None available TECHNIQUE: CT scan of the chest, abdomen and pelvis was performed on multidetector CT scanner using volumetric acquisition technique and images were reconstructed in multiple planes with variable thickness and reviewed on dedicated workstations. CONTRAST: Without contrast CT scan radiation dose is optimized to minimum requisite dose  using automated dose modulation techniques. FINDINGS: Endotracheal tube terminates in the midthoracic trachea. Enteric tube terminates in the stomach. Lower approach venous ECMO cannula terminates in the mid SVC. Right femoral arterial ECMO cannula terminates in the distal abdominal aorta. Left femoral arterial central venous catheter terminates in the distal abdominal aorta. Left femoral venous catheter terminates in the proximal left common iliac artery. Chest: Mediastinum: The visualized thyroid is unremarkable. The heart is normal in size. No significant  pericardial effusion. Moderate coronary artery calcifications. The ascending aorta and main pulmonary artery are normal in caliber. No  thoracic lymphadenopathy. The esophagus is unremarkable.  Lungs: The central airway is clear. No significant pleural effusion. No pneumothorax. Left lower lobe consolidative opacity with additional consolidative and groundglass opacities of the lung bases bilaterally. Crazy paving pattern demonstrated in the left upper lobe.  Abdomen and Pelvis: Liver: No mass. No intrahepatic biliary ductal dilation. Biliary System: Vicarious excretion of contrast material in the gallbladder. No definite cholelithiasis. No evidence of acute cholecystitis. No extrahepatic biliary ductal dilation. Pancreas: No mass or pancreatic ductal dilation. Adrenal glands: No mass or nodules Spleen: Normal. Kidneys: Atrophic appearance of the left kidney. Fetal lobulation of the right kidney. Scattered simple cysts bilaterally. No hydronephrosis. Gastrointestinal tract:  No abnormally dilated loops of bowel. Liquid contents of the small and large bowel. Mesentery/peritoneum/retroperitoneum: No mass. No free fluid or air. Lymph nodes: No significant lymphadenopathy. Vasculature: Nonaneurysmal abdominal aorta with scattered aortobiiliac atherosclerotic calcifications. Pelvis: The urinary bladder is well distended with circumferential bladder wall thickening  and small right posterolateral diverticulum. The prostate is mildly enlarged with coarse prostatic calcifications. Osseous structures: Nondisplaced midsternal fracture. No displaced rib fracture. Evaluation for nondisplaced rib fractures limited by respiratory motion artifact.   Soft tissues: Within normal limits.     IMPRESSION: 1. Left basilar consolidation suspicious for infection. Additional scattered opacities bilaterally may represent pulmonary edema, aspiration, and/or infection. 2. Nondisplaced midsternal fracture, likely sequelae of cardiopulmonary resuscitation. 3. No acute intra-abdominal findings. Consider Russo decompression for prominent distended urinary bladder. I have personally reviewed the examination and initial interpretation and I agree with the findings. MARTHA WANG MD   SYSTEM ID:  [id number]    Cardiac Catheterization    Result Date: 4/20/2024  Successful cannulation with ECMO using 17Fr arterial and 25Fr venous cannulas in the RFA and RFV, respectively. Placement of the distal perfusion cannula was unsuccessful due to severe calcific PAD. Coronary angiogram showing severe 2 vessel disease including acute on chronic occlusion of the mid-RCA and severe D1 disease.  There were moderate lesions in the proximal LCx and LAD. Successful PCI of the RCA using 1 HAY. Successful insertion of a triple lumen CVC in the LFV Successful insertion of a right radial arterial line.     US Lower Extremity Arterial Duplex Bilateral    Result Date: 4/20/2024  Exam: Duplex ultrasound of the bilateral lower extremity arteries dated 4/20/2024 3:00 PM Clinical information: Baseline to assess blood flow to Lower Extremities (VA ECMO) Comparison: None Technique: Grayscale (B-mode), color Doppler, and duplex spectral Doppler ultrasound of the lower extremity arteries. Velocity measurements obtained with angle correction of 60 degrees or less. Findings: Right lower extremity: Right groin vasculature are obscured due to  ECMO cannulas. Mid SFA:Velocity:  3 cm/sec. Distal SFA: Velocity: No flow visualized. Popliteal artery: No flow visualized. PTA ankle: No flow visualized. ELISSA ankle: No flow visualized. Left lower extremity: Right groin vasculature are obscured due to central catheters. Mid SFA: Velocity: 13 cm/sec. Distal SFA: Velocity: 4 cm/sec. Waveforms: Monophasic Popliteal artery, proximal: Velocity: 11 cm/sec. Waveforms: Monophasic. PTA ankle: Velocity: 19 cm/sec. Waveforms: Monophasic ELISSA ankle: Velocity: 3 cm/sec. Waveforms: Monophasic     Impression: 1. Right leg: Diminished flow in the mid SFA measuring 3 cm/s. No flow is visualized in the distal SFA, popliteal artery, PTA, or ELISSA. 2. Left leg: Patent arteries of the left lower extremity with diminished flow throughout. [Urgent Result: Occluded right distal SFA and distally.] Finding was identified on 4/20/2024 3:58 PM. Cheryle Lua RN was contacted by Dr. Dunn at 4/20/2024 4:32 PM and verbalized understanding of the urgent finding. I have personally reviewed the examination and initial interpretation and I agree with the findings. BORIS PABON DO   SYSTEM ID:  [id number]    XR Abdomen Port 1 View    Result Date: 4/20/2024  EXAMINATION:  XR ABDOMEN PORT 1 VIEW 4/20/2024 3:15 PM COMPARISON: CT 4/20/2024. HISTORY: OG placement. TECHNIQUE: Frontal view of the abdomen. FINDINGS: Gastric tube sidehole and tip projecting over the stomach. Lower approach venous ECMO cannula tip projects over the low SVC. Right lower approach arterial ECMO catheter tip projects over the lower abdominal aorta. Left lower extremity approach arterial and venous catheters project over the lower abdomen. No abnormally dilated loops of bowel.  No pneumatosis or portal venous gas.     IMPRESSION: Gastric tube sidehole and tip project over the stomach. I have personally reviewed the examination and initial interpretation and I agree with the findings. BORIS PABON DO   SYSTEM ID:  [id number]    [facility]  Bilateral    RESIDENT PRELIMINARY INTERPRETATION Impression: 1. Right side:     Degree of stenosis of the internal carotid artery: Less than 50 %. 2. Left side:     Degree of stenosis of the internal carotid artery: Less than 50 %. [facility] in the [place] Accreditation Commission Updated Recommendations for Carotid Stenosis Interpretation Criteria - October 2021. [email or web address]-[email or web address] [email or web address]-for-Carotid-Stenosis-Interpre ation-Criteria.pdf Society for Vascular Surgery Clinical Practice Guidelines for Management of Extracranial Cerebrovascular Disease. Journal of Vascular Surgery Vol. 75, Issue 1, Supplement [id number] Published online: June 18, 2021 (additional criteria adjustment for >70% ICA stenosis)      Normal           ICA PSV: < 180 cm/s           Plaque: None           ICA/CCA PSV Ratio: < 2.0           ICA EDV: < 40 cm/s      < 50%           ICA PSV: < 180 cm/s           Plaque: < 50%           ICA/CCA PSV Ratio: < 2.0           ICA EDV: < 40 cm/s      50 - 69%           ICA PSV: < 180 - 230 cm/s           ICA/CCA PSV Ratio: 2.0 - 4.0           ICA EDV: 40 - 100 cm/s      > 70%           ICA PSV: > 230 cm/s AND           ICA/CCA PSV Ratio: > 4.0 or ICA EDV: > 100 cm/s      Total occlusion           ICA PSV: Undetectable           ICA/CCA PSV Ratio: N/A           ICA EDV: N/A    XR Chest Port 1 View    Result Date: 4/20/2024  Exam: XR CHEST PORT 1 VIEW, 4/20/2024 3:15 PM Indication: Check endotracheal tube placement and ECLS cannula placement. DO NOT log-roll patient.  Place film under patient using patient safety handling process. Comparison: CT 4/20/2024 Findings: Portable frontal view of the chest. Endotracheal tube tip projects over the midthoracic trachea. Enteric tube courses below the diaphragm. Lower approach ECMO cannula tip projects over the low SVC. The cardiac silhouette is within normal limits. No significant pleural effusion  or pneumothorax. Left greater than right interstitial with patchy airspace opacities, similar to findings on CT.     Impression: 1. Endotracheal tube tip projects over the midthoracic trachea. 2. Lower approach ECMO cannula tip projects over the low SVC. I have personally reviewed the examination and initial interpretation and I agree with the findings. BORIS PABON DO   SYSTEM ID:  [id number]    CT Head w/o Contrast    Result Date: 4/20/2024  EXAM: CT HEAD W/O CONTRAST  4/20/2024 1:07 PM HISTORY:  out of hospital cardiac arrest initial scan looking for any pathology   COMPARISON:  None. TECHNIQUE: Using multidetector thin collimation helical acquisition technique, axial, coronal and sagittal CT images from the skull base to the vertex were obtained without intravenous contrast.  (topogram) image(s) also obtained and reviewed. FINDINGS: Retained contrast from recent catheterization. Old lacunar infarct in left basal ganglia. Periventricular and subcortical white matter hypoattenuation, nonspecific, but likely represents chronic small vessel ischemic disease. No acute intracranial hemorrhage, mass effect, or midline shift. No acute loss of gray-white matter differentiation in the cerebral hemispheres. The ventricles are proportionate to the cerebral sulci. Clear basal cisterns. The bony calvaria in the bones of the skull base are normal. The visualized portions of the paranasal sinuses and mastoid air cells are clear. Orbits are unremarkable.     IMPRESSION: 1. No acute intracranial pathology. 2. Old lacunar infarct in the left basal ganglia. White matter changes of likely chronic small vessel ischemic disease. I have personally reviewed the examination and initial interpretation and I agree with the findings. SP BUCHANAN MD   SYSTEM ID:  [id number]

## 2024-04-21 NOTE — CONSULTS
CARDIAC SURGERY CONSULTATION  04/21/2024      REASON FOR CONSULT: Urgent ECMO decannulation   - - - - - - - - - - - - - - - - - - - - - - - - - - - - - - - - - - - - - - - - - - - - - -   HISTORY PRESENTING ILLNESS:   Elie Prado is a 63 year old year old male that presents with with PMH of CKD and opioid dependence who 4/20 presented to hospital with refractory V fib arrest found to be 2/2 RCA lesion now s/p PCI, who is currently on VA ECMO via R femoral artery (16fr) and right femoral vein.      Per chart review 4/20 am he reported not feeling well and leaving a store, and his brother went to go check on him in the car 2 minutes later and found him unresponsive. CPR was started immediately,  EMS arrived 6 minutes later. Underwent total 40 minutes CPR. Received 6 shocks. Cannulated on VA ECMO, per report distal reperfusion catheter was unable to be placed 2/2 SFA occlusion. Underwent emergent heart cath which revealed RCA lesion which was thus stented.     At present, he has acute limb ischemia with worsening lactic acidosis. Vascular surgery deem limb unsalvageable and will await demarcation prior to AKA. CV surgery consulted for urgent ECMO decannulation.       PAST MEDICAL HISTORY:   No past medical history on file.    SURGICAL HISTORY:   No past surgical history on file.    SOCIAL HISTORY:   Social History     Socioeconomic History    Marital status:        FAMILY HISTORY: No bleeding/clotting disorders nor problems with anesthesia.   No family history on file.    ALLERGIES:   Allergies   Allergen Reactions    Diclofenac GI Disturbance       MEDICATIONS:  No medications prior to admission.       PHYSICAL EXAMINATION:  Temp:  [93  F (33.9  C)-98.6  F (37  C)] 98.6  F (37  C)  Pulse:  [] 124  Resp:  [10-38] 27  MAP:  [29 mmHg-347 mmHg] 86 mmHg  Arterial Line BP: ()/(54-99) 101/78  FiO2 (%):  [50 %-60 %] 50 %  SpO2:  [74 %-100 %] 100 %    Neuro: Sedated, intubated  Pulm: Vent Mode: CMV/AC   (Continuous Mandatory Ventilation/ Assist Control)  FiO2 (%): 60 %  Resp Rate (Set): 10 breaths/min  Tidal Volume (Set, mL): 450 mL  PEEP (cm H2O): 5 cmH2O  Resp: 22    CV: On VA ECMO, turn down study with improved EF. SvO2 55 from 70 and pO2 49 from 97. BP and SpO2 remained stable during turn down  MSK/Extremities: Mottled RLE without signals. LLE with only monophasic pop signal. R femoral arterial and venous cannulas present    Pertinent Labs: Reviewed.     Arterial Blood Gases   Recent Labs   Lab 04/21/24  1112 04/21/24  0950 04/21/24  0741 04/21/24  0559   PH 7.55* 7.64* 7.45 7.26*   PCO2 44 38 40 46*   PO2 49* 97 127* 68*   HCO3 38* 41* 28 21       Complete Blood Count   Recent Labs   Lab 04/21/24  1112 04/21/24  0950 04/21/24  0256 04/20/24 2202   WBC 31.6* 30.4* 31.2* 28.1*   HGB 9.6* 8.9* 11.3* 11.1*    163 253 266       Basic Metabolic Panel  Recent Labs   Lab 04/21/24  0256 04/20/24 2202 04/20/24  1640 04/20/24  1306   * 146* 144 144   POTASSIUM 4.9 3.0* 3.9 3.9   CHLORIDE 114* 114* 112* 108*   CO2 16* 17* 18* 14*   BUN 52.1* 47.7* 44.9* 44.7*   CR 3.18* 2.79* 2.64* 2.60*       Liver Function Tests  Recent Labs   Lab 04/21/24  0950 04/21/24  0256 04/20/24 2202 04/20/24  1640 04/20/24  1306   AST  --  368* 299* 266* 191*   ALT  --  125* 98* 89* 72*   ALKPHOS  --  122 119 128 150   BILITOTAL  --  1.1 1.0 0.8 0.5   ALBUMIN  --  2.6* 2.5* 2.6* 2.3*   INR 1.78* 1.35* 1.40* 1.48*  --        Coagulation Profile  Recent Labs   Lab 04/21/24  0950 04/21/24  0256 04/20/24  2202 04/20/24  1640   INR 1.78* 1.35* 1.40* 1.48*   PTT 59* 80* 48* 97*         IMAGING:  Recent Results (from the past 24 hour(s))   Cardiac Catheterization    Narrative    Successful cannulation with ECMO using 17Fr arterial and 25Fr venous   cannulas in the RFA and RFV, respectively.   Placement of the distal perfusion cannula was unsuccessful due to severe   calcific PAD.  Coronary angiogram showing severe 2 vessel disease  including acute on   chronic occlusion of the mid-RCA and severe D1 disease.  There were   moderate lesions in the proximal LCx and LAD.  Successful PCI of the RCA using 1 HAY.  Successful insertion of a triple lumen CVC in the LFV  Successful insertion of a right radial arterial line.     CT Head w/o Contrast    Narrative    EXAM: CT HEAD W/O CONTRAST  4/20/2024 1:07 PM     HISTORY:  out of hospital cardiac arrest initial scan looking for any  pathology       COMPARISON:  None.    TECHNIQUE: Using multidetector thin collimation helical acquisition  technique, axial, coronal and sagittal CT images from the skull base  to the vertex were obtained without intravenous contrast.   (topogram) image(s) also obtained and reviewed.    FINDINGS:  Retained contrast from recent catheterization.    Old lacunar infarct in left basal ganglia. Periventricular and  subcortical white matter hypoattenuation, nonspecific, but likely  represents chronic small vessel ischemic disease.    No acute intracranial hemorrhage, mass effect, or midline shift. No  acute loss of gray-white matter differentiation in the cerebral  hemispheres. The ventricles are proportionate to the cerebral sulci.  Clear basal cisterns.    The bony calvaria in the bones of the skull base are normal. The  visualized portions of the paranasal sinuses and mastoid air cells are  clear. Orbits are unremarkable.       Impression    IMPRESSION:   1. No acute intracranial pathology.  2. Old lacunar infarct in the left basal ganglia. White matter changes  of likely chronic small vessel ischemic disease.    I have personally reviewed the examination and initial interpretation  and I agree with the findings.    SP BUCHANAN MD         SYSTEM ID:  [id number]   CT Chest Abdomen Pelvis w/o Contrast    Narrative    EXAMINATION: CT CHEST ABDOMEN PELVIS W/O CONTRAST, 4/20/2024 1:26 PM    INDICATION: out of hospital cardiac arrest with MARGUERITE, looking for  bleeding or any  other pathology    COMPARISON STUDY: None available    TECHNIQUE: CT scan of the chest, abdomen and pelvis was performed on  multidetector CT scanner using volumetric acquisition technique and  images were reconstructed in multiple planes with variable thickness  and reviewed on dedicated workstations.     CONTRAST: Without contrast    CT scan radiation dose is optimized to minimum requisite dose using  automated dose modulation techniques.    FINDINGS:  Endotracheal tube terminates in the midthoracic trachea. Enteric tube  terminates in the stomach. Lower approach venous ECMO cannula  terminates in the mid SVC. Right femoral arterial ECMO cannula  terminates in the distal abdominal aorta. Left femoral arterial  central venous catheter terminates in the distal abdominal aorta. Left  femoral venous catheter terminates in the proximal left common iliac  artery.    Chest:   Mediastinum: The visualized thyroid is unremarkable. The heart is  normal in size. No significant  pericardial effusion. Moderate  coronary artery calcifications. The ascending aorta and main pulmonary  artery are normal in caliber. No  thoracic lymphadenopathy. The  esophagus is unremarkable.      Lungs: The central airway is clear. No significant pleural effusion.  No pneumothorax. Left lower lobe consolidative opacity with additional  consolidative and groundglass opacities of the lung bases bilaterally.  Crazy paving pattern demonstrated in the left upper lobe.     Abdomen and Pelvis:  Liver: No mass. No intrahepatic biliary ductal dilation.    Biliary System: Vicarious excretion of contrast material in the  gallbladder. No definite cholelithiasis. No evidence of acute  cholecystitis. No extrahepatic biliary ductal dilation.    Pancreas: No mass or pancreatic ductal dilation.    Adrenal glands: No mass or nodules    Spleen: Normal.    Kidneys: Atrophic appearance of the left kidney. Fetal lobulation of  the right kidney. Scattered simple cysts  bilaterally. No  hydronephrosis.    Gastrointestinal tract:  No abnormally dilated loops of bowel. Liquid  contents of the small and large bowel.    Mesentery/peritoneum/retroperitoneum: No mass. No free fluid or air.    Lymph nodes: No significant lymphadenopathy.    Vasculature: Nonaneurysmal abdominal aorta with scattered aortobiiliac  atherosclerotic calcifications.    Pelvis: The urinary bladder is well distended with circumferential  bladder wall thickening and small right posterolateral diverticulum.   The prostate is mildly enlarged with coarse prostatic calcifications.    Osseous structures: Nondisplaced midsternal fracture. No displaced rib  fracture. Evaluation for nondisplaced rib fractures limited by  respiratory motion artifact.      Soft tissues: Within normal limits.      Impression    IMPRESSION:   1. Left basilar consolidation suspicious for infection. Additional  scattered opacities bilaterally may represent pulmonary edema,  aspiration, and/or infection.  2. Nondisplaced midsternal fracture, likely sequelae of  cardiopulmonary resuscitation.  3. No acute intra-abdominal findings. Consider Russo decompression for  prominent distended urinary bladder.    I have personally reviewed the examination and initial interpretation  and I agree with the findings.    MARTHA WANG MD         SYSTEM ID:  [id number]   US Lower Extremity Arterial Duplex Bilateral   Result Value    Radiologist flags Occluded right distal SFA and distally. (Urgent)    Narrative    Exam: Duplex ultrasound of the bilateral lower extremity arteries  dated 4/20/2024 3:00 PM     Clinical information: Baseline to assess blood flow to Lower  Extremities (VA ECMO)     Comparison: None    Technique: Grayscale (B-mode), color Doppler, and duplex spectral  Doppler ultrasound of the lower extremity arteries. Velocity  measurements obtained with angle correction of 60 degrees or less.    Findings:     Right lower extremity:     Right groin  vasculature are obscured due to ECMO cannulas.  Mid SFA:Velocity:  3 cm/sec.   Distal SFA: Velocity: No flow visualized.     Popliteal artery: No flow visualized.    PTA ankle: No flow visualized.    ELISSA ankle: No flow visualized.    Left lower extremity:    Right groin vasculature are obscured due to central catheters.  Mid SFA: Velocity: 13 cm/sec.   Distal SFA: Velocity: 4 cm/sec. Waveforms: Monophasic    Popliteal artery, proximal: Velocity: 11 cm/sec. Waveforms:  Monophasic.    PTA ankle: Velocity: 19 cm/sec. Waveforms: Monophasic  ELISSA ankle: Velocity: 3 cm/sec. Waveforms: Monophasic      Impression    Impression:   1. Right leg: Diminished flow in the mid SFA measuring 3 cm/s. No flow  is visualized in the distal SFA, popliteal artery, PTA, or ELISSA.  2. Left leg: Patent arteries of the left lower extremity with  diminished flow throughout.    [Urgent Result: Occluded right distal SFA and distally.]    Finding was identified on 4/20/2024 3:58 PM.     Cheryle Lua RN was contacted by Dr. Dunn at 4/20/2024 4:32 PM and  verbalized understanding of the urgent finding.     I have personally reviewed the examination and initial interpretation  and I agree with the findings.    BORIS PABON DO         SYSTEM ID:  [id number]   US Carotid Bilateral    Narrative    Exam: Bilateral carotid duplex Doppler ultrasound dated 4/20/2024 3:00  PM    Clinical history: Cardiac Arrest on ECMO    Comparison Study: None    Technique: Grayscale (B-mode) and duplex and spectral Doppler  ultrasound of the extracranial internal carotid, external carotid,  vertebral artery origins, right brachiocephalic/subclavian and left  subclavian arteries. Velocity measurements obtained with angle  correction at or less than 60 degrees.    Findings:    Right side:     Plaque Morphology: Predominantly echogenic, Smooth       Proximal CCA: 95/82 cm/sec     Mid CCA: 99/86 cm/sec     Distal CCA: 74/63 cm/sec     External CA: 47/40 cm/sec       Proximal  ICA: 89/78 cm/sec     Mid ICA: 70/61 cm/sec     Distal ICA: 101/80 cm/sec       Vertebral artery: Not visualized.     ICA/CCA ratio: 1.02    Left side:     Plaque Morphology: Predominantly echogenic, Smooth       Proximal CCA: 92 cm/sec     Mid CCA: 104/85 cm/sec     Distal CCA: 85/66 cm/sec          External CA: 50 cm/sec       Proximal ICA: 77 cm/sec     Mid ICA: 79 cm/sec     Distal ICA: 96 cm/sec       Vertebral artery: 76 cm/sec, antegrade    ICA/CCA ratio: 0.92      Impression    Impression:    1. Right side:        Degree of stenosis of the internal carotid artery: Less than 50 %.    2. Left side:         Degree of stenosis of the internal carotid artery: Less than 50 %.      [facility] Updated Recommendations for  Carotid Stenosis Interpretation Criteria - October 2021.  https://[email or web address].org/wp-content/uploads/2021/10/IAC-Vascular-Test  la-Pnuveilinqstb_Sowuyfb-Rcktkrsvwcwwzgg-for-Carotid-Stenosis-Interpre  ation-Criteria.pdf    Society for Vascular Surgery Clinical Practice Guidelines for  Management of Extracranial Cerebrovascular Disease. Journal of  Vascular Surgery Vol. 75, Issue 1, Supplement [id number] Published  online: June 18, 2021 (additional criteria adjustment for >70% ICA  stenosis)         Normal            ICA PSV: < 180 cm/s            Plaque: None            ICA/CCA PSV Ratio: < 2.0            ICA EDV: < 40 cm/s         < 50%            ICA PSV: < 180 cm/s            Plaque: < 50%            ICA/CCA PSV Ratio: < 2.0            ICA EDV: < 40 cm/s         50 - 69%            ICA PSV: < 180 - 230 cm/s            ICA/CCA PSV Ratio: 2.0 - 4.0            ICA EDV: 40 - 100 cm/s         > 70%            ICA PSV: > 230 cm/s AND             ICA/CCA PSV Ratio: > 4.0 or ICA EDV: > 100 cm/s         Total occlusion            ICA PSV: Undetectable            ICA/CCA PSV Ratio: N/A            ICA EDV: N/A    I have personally reviewed the examination and initial  interpretation  and I agree with the findings.    MIRIAN GARRETT         SYSTEM ID:  [id number]   XR Chest Port 1 View    Narrative    Exam: XR CHEST PORT 1 VIEW, 4/20/2024 3:15 PM    Indication: Check endotracheal tube placement and ECLS cannula  placement. DO NOT log-roll patient.  Place film under patient using  patient safety handling process.    Comparison: CT 4/20/2024    Findings:   Portable frontal view of the chest. Endotracheal tube tip projects  over the midthoracic trachea. Enteric tube courses below the  diaphragm. Lower approach ECMO cannula tip projects over the low SVC.  The cardiac silhouette is within normal limits. No significant pleural  effusion or pneumothorax. Left greater than right interstitial with  patchy airspace opacities, similar to findings on CT.      Impression    Impression:   1. Endotracheal tube tip projects over the midthoracic trachea.  2. Lower approach ECMO cannula tip projects over the low SVC.     I have personally reviewed the examination and initial interpretation  and I agree with the findings.    BORIS PABON DO         SYSTEM ID:  [id number]   XR Abdomen Port 1 View    Narrative    EXAMINATION:  XR ABDOMEN PORT 1 VIEW 4/20/2024 3:15 PM     COMPARISON: CT 4/20/2024.    HISTORY: OG placement.    TECHNIQUE: Frontal view of the abdomen.    FINDINGS: Gastric tube sidehole and tip projecting over the stomach.  Lower approach venous ECMO cannula tip projects over the low SVC.  Right lower approach arterial ECMO catheter tip projects over the  lower abdominal aorta. Left lower extremity approach arterial and  venous catheters project over the lower abdomen. No abnormally dilated  loops of bowel.  No pneumatosis or portal venous gas.       Impression    IMPRESSION: Gastric tube sidehole and tip project over the stomach.    I have personally reviewed the examination and initial interpretation  and I agree with the findings.    BORIS PABON DO         SYSTEM ID:  [id number]    Echocardiogram Complete   Result Value    LVEF  5-10% (severely reduced)    [facility]    [id number]  [id number]  [id number]  [id number]^BETHANY^DEEPTI^MARGARITA     [facility],[place]  Echocardiography Laboratory  [address] [place] [id number]     Name: DEANNA ALMEIDA  MRN: [id number]  : 1960  Study Date: 2024 08:27 AM  Age: 63 yrs  Gender: Male  Patient Location: [facility]  Reason For Study: Cardiac Arrest  Ordering Physician: DEEPTI SIMS  Performed By: Caty Reid RDCS     BSA: 1.5 m2  Height: 67 in  Weight: 98 lb  ______________________________________________________________________________  Procedure  Echocardiogram with two-dimensional, color and spectral Doppler performed.  ______________________________________________________________________________  Interpretation Summary  Patient on VA ECMO at 4.3 LPM.     Left ventricular function is severely reduced. The ejection fraction is 5-10%.  Global right ventricular function is severely reduced.  Severe global hypokinesis with sparing of the RV and LV apex. Consider  pulmonary embolism. Could also be a form of stress cardiomyopathy.  No pericardial effusion.  There is no prior study for direct comparison.  ______________________________________________________________________________  Left Ventricle  Left ventricular size is normal. Left ventricular wall thickness cannot  evaluate. Left ventricular function is decreased. The ejection fraction is 5-  10% (severely reduced). Left ventricular diastolic function is not assessable.  Severe diffuse hypokinesis is present.     Right Ventricle  Global right ventricular function is severely reduced.     Atria  The atria cannot be assessed.     Mitral Valve  Mild mitral annular calcification is present. Trace mitral insufficiency is  present.     Aortic Valve  Aortic valve opens with each cardiac cycle.     Tricuspid Valve  The tricuspid valve is normal. The peak velocity of  the tricuspid regurgitant  jet is not obtainable. Pulmonary artery systolic pressure cannot be assessed.     Vessels  ECMO cannula is present in the IVC.     Pericardium  No pericardial effusion is present.     Compared to Previous Study  There is no prior study for direct comparison.  ______________________________________________________________________________  Report approved by: Yany Licona 04/21/2024 11:17 AM     ______________________________________________________________________________            ASSESSMENT:  Elie Prado is a 63 year old male who was admitted on 4/20/2024. He has acute limb ischemia with worsening lactic acidosis. ECMO turn down study showed better EF    PLAN:    - Emergent/urgent ECMO decannulation today  - Discussed potential risks, including mortality with family at bedside. Consent obtained and will proceed    Patient findings and plan discussed with attending Dr. Barnett.      Franco Mena MD  Cardiothoracic Surgery Fellow  Pager: [phone]

## 2024-04-21 NOTE — PROGRESS NOTES
Brief Cardiology Note CODE STATUS AND PLAN FOR THE OR DECANNULATION    Writer spoke to Terri Prado ([phone]) regarding Mr. Prado's code status. We also went over his labs and clinical conditions.     She understands that he is very sick and that he will be undergoing very high-risk procedure in the OR. She understands his condition as well as the risks of the procedure, and she reports the following:  - FULL CODE including chest compressions and pharmacological resuscitations per ACLS protocol.  - She understands that he is not a candidate for re cannulation for ECMO and she agrees with this.   - She also understands that even with decannulation we most likely not be able to save his leg but she would like to try.     Thank you for allowing me to care for this patient, please don't hesitate to contact me with any questions regarding this plan.   Pt was discussed and evaluated with Moses Finch*, attending physician, who agrees with the assessment and plan above.    Seth Olson MD, PhD  Cardiology Fellow

## 2024-04-21 NOTE — PROGRESS NOTES
[facility]    ECLS Shift Summary:     ECMO Equipment:  Console Serial Number: [id number]  Circuit Lot Number: [id number]  Oxygenator Lot Number: [id number]    Circuit Assessment: Free of fibrin, clot, and air    Arterial ECMO Cannula: 17 Fr in the Right Femoral Artery  Venous ECMO Cannula: 25 Fr in the Right Femoral Vein  ECMO Cannula Arterial Right femoral artery-Site Assessment: WDL, Sutured, Secure  ECMO Cannula Venous Right femoral vein-Site Assessment: WDL, Sutured, Secure  ECMO Cannula Arterial Right femoral artery-Site Intervention: No intervention needed  ECMO Cannula Venous Right femoral vein-Site Intervention: No intervention needed    Patient remains on V-A ECMO, all equipment is functioning and alarms are appropriately set. RPM's: 3650 with Blood Flow (Circuit) LPM  Avg: 3.8 LPM  Min: 3.75 LPM  Max: 3.96 LPM L/min. Sweep is at 3 LPM and 50 %. Extremities are dusky R>L with no pulses from R.     Significant Shift Events:   Gave LR at 0500 for circuit chugging.     Vent settings:  Vent Mode: CMV/AC  (Continuous Mandatory Ventilation/ Assist Control)  FiO2 (%): 50 %  Resp Rate (Set): 10 breaths/min  Tidal Volume (Set, mL): 450 mL  PEEP (cm H2O): 5 cmH2O  Resp: 27      Anticoagulation:  Dose (units/hr) HEParin: 650 Units/hr  Rate (mL/hr) HEParin: 6.5 mL/hr  Concentration HEParin: 100 Units/mL        Most recent: ACT  (seconds): 197 seconds    Urine output is  .  Blood loss was minimal. Product given included 250 mls LR.     Intake/Output Summary (Last 24 hours) at 4/21/2024 0551  Last data filed at 4/21/2024 0500  Gross per 24 hour   Intake 2588.85 ml   Output 2105 ml   Net 483.85 ml       Labs:  Recent Labs   Lab 04/21/24  0505 04/21/24  0303 04/21/24  0256 04/21/24  0159 04/20/24  2349   PH 7.20* 7.22*  --  7.28* 7.30*   PCO2 47* 42  --  39 41   PO2 74* 96  --  100 82   HCO3 18* 17*  --  18* 20*   O2PER 50 50 50  50 50 50       Lab Results   Component Value Date     HGB 11.3 (L) 04/21/2024    PHGB 30 (H) 04/21/2024     04/21/2024    FIBR 485 04/21/2024    INR 1.35 (H) 04/21/2024    PTT 80 (H) 04/21/2024    DD >20.00 (HH) 04/21/2024       Plan is to continue VA ECMO and possibly start CRRT today.    Bibi Ferguson, RT  ECMO Specialist  4/21/2024 5:51 AM

## 2024-04-21 NOTE — PROGRESS NOTES
[facility]    ECLS Cannulation Note:     Date on: 4/20/2024  Time on: 1043  Cannulating Physician: Geraldine      Arterial Cannula: 17 Fr. In the Right Femoral Artery  Venous Cannula: 25 Fr. In the Right Femoral vein      ECMO components include:  Console Serial Number: [id number]  Circuit Lot Number: [id number]  Oxygenator Lot Number: [id number]    Cannulation was performed in the Cath Lab, placement was verified by fluoroscopy, cannula position is confirmed in cath lab .    Patient's blood type is A, positive.    Megan E. Dressler, RN  ECMO Specialist  4/21/2024 7:01 AM

## 2024-04-21 NOTE — PROGRESS NOTES
Critical Care Cardiology: History and Physical  Derrick Covarrubias MRN: [id number]  Age: 89 year old, : 1899  Date: 2024    History of Present Illness     Derrick Covarrubias is a 64 year old male being admitted to the CICU on s/p refractory VF cardiac arrest requiring V-A ECMO. The patient has a PMHx of Chronic renal disease and percocet dependence. Brought to the CCL and angiogram revealed RCA culprit lesion     Shopping with brother in law, he wasn't feeling right and went to his car. His brother in law went to check on him about 2 minutes later and found him unresponsive. He called 911 at 0953, pulled him out of the car and began CPR. Medics arrived at 0957 and found him to be in VF. He was shocked a total of 6 times, given 300 amiodarone, 3 amps of EPI, and devolved to PEA. An ETT was placed at the scene and he was taken to [facility]. Total CPR time 40 minutes. Arrived at [facility] CCL 1037 with MARGUERITE going. On pump at 1044. Coronary angiogram completed showing pulsatility and culprit lesion in RCA, stent placed    Interval Changes: R leg without perfusion since cannulation , RLE purple.  Placement of distal perfusion catheter was not possible due to severe calcified PAD. Warmer placed on leg, pressors weaned off, and heparin goal increased to high intensity heparin protocol using Xa to adjust heparin to maximize flow to leg. Bicarb infusion started over the night. Over the morning lactic rising, and sodium rising on bicarb infusion. Nephrology consulted and will initiate CRRT. Will plan for Urgent decannulation of ECMO if we can get him stable enough. Per Vascular will need amputation of ischemic limb, discussed with family will discuss again with them if amputation is needed urgently.     Sent to OR at 1600 and successfully Decannulated. Will need CRRT on arrival to ICU to manage lactic from ischemic leg.    Plan:  Discontinue bicarb infusion  D20 gtt for hypoglycemia  HIT heparin  protocol  Decrease ECMO support and increase ventilator support  Urgent turn down  CVTS consult for diana  Nephrology consult  CRRT  Line placement    Assessment and plan:  Neurology   # Concern for anoxic brain injury    - Intubated, sedated  - Warm 0.5 degrees C per hour until 37.0. Avoiding hyperthermia.  - Neuro-crit consulted and appreciate recommendations.   - vEEG   - Trend S100 B and Neuron specific enolase   - Carotid US post arrest ordered  - Palliative care consulted.     CT head 2024 No acute intracranial pathology. Old lacunar infarct in the left basal ganglia. White matter changes  of likely chronic small vessel ischemic diseas    Current sedation:  Versed off  Fentanyl off      Plan:  - Neuro assessment as soon as able in post arrest setting  - Continue sedation with a RASS goal -2 to 3 for safety with ECMO cannulation  - Permissive hypercapnea and hypernatremia for neuroprotection     Cardiovascular  # Refractory VF Cardiac arrest   # Cardiogenic shock requiring VA ECMO  # CAD  # Cardiomyopathy  # Dyslipidemia  Peripheral V-A ECMO inserted via RCFA and RCFV distal perfusion cannula present  Angiogram: Culprit RCA stent  TTE:  Left ventricular function is severely reduced. The ejection fraction is 5-10%.  Global right ventricular function is severely reduced.  Severe global hypokinesis with sparing of the RV and LV apex. Consider  pulmonary embolism. Could also be a form of stress cardiomyopathy.       ECMO:  Mode: V-A   Pump Type: Cardiohelp  RPM's: (S) 4000  Blood Flow (Circuit) LPM: 4.75 LPM  Sweep LPM: (S) 3 LPM  Sweep FiO2   %: 50 %  Venous Pressure  mmHg: -33 mmHg  Arterial Pressure  mmH mmHg  Internal Pressure mmH mmHg  Pressure Change mmH mmHg    Current Pressors/inotropes/antiarrythmic:  Epi 0.03    Plan:  - TTE post arrest ordered  - Continue ASA 81mg and ticagrelor 90mg BID   - Hold lipitor for now given shock liver  - Hold ACE/ARB for now given likely reduced  renal fxn after arrest  - Holding beta blocker given shock  - Telemetry monitoring  - Trend troponin to peak  - Maximize ECMO flows as able  - Arterial US LE with ECMO cannula placement  - Fluid resuscitation to maintain ECMO flows     Pulmonary  # Acute hypoxemic respiratory failure requiring intubation  # Probable aspiration pneumonia    Vent Settings:   Vent Mode: CMV/AC  (Continuous Mandatory Ventilation/ Assist Control)  FiO2 (%): 60 %  Resp Rate (Set): 10 breaths/min  Tidal Volume (Set, mL): 450 mL  PEEP (cm H2O): 5 cmH2O  Resp: 12      ABG:   Recent Labs   Lab 04/21/24  1655 04/21/24  1621 04/21/24  1551 04/21/24  1447   PH 7.23* 7.46* 7.50* 7.54*   PCO2 73* 59* 43 38   PO2 90 68* 90 142*   HCO3 31* 42* 33* 33*   O2PER 100.0 100.0 100.0 60       CXR: ETT needs advancement 3 cm, clear    Plan:  - Wean vent as able  - Daily CXR  - Serial ABGs   - Consider scheduled duonebs if signs of lung dz, currently PRN    - Peridex        Gastrointestinal, Nutrition  # Shock liver 2/2 cardiac arrest  No known medical hx.     CAP CT 4/20/2024 with P     Recent Labs   Lab 04/21/24  1253 04/21/24  1119 04/21/24  0256   AST 3,214* 2,984* 368*   ALT 1,331* 1,278* 125*   BILITOTAL 1.2 1.5* 1.1   ALBUMIN 2.1* 2.0* 2.6*   PROTTOTAL 4.0* 3.9* 5.1*   ALKPHOS 97 96 122       Plan:  - Monitor LFTs  - NPO 48 hours  - Stooling  - GI Prophylaxis: PPI; Protonix 40 mg daily     Renal, Electrolytes  # Chronic kidney disease stage III  # Acute on chronic Renal Injury 2/2 cardiac arrest  # Lactic acidosis  No intake or output data in the 24 hours ending 04/20/24 1034  Recent Labs   Lab 04/21/24  1655 04/21/24  1621 04/21/24  1551 04/21/24  1253 04/21/24  1245 04/21/24  1119 04/21/24  0950 04/21/24  0256 04/20/24  2202   * 171* 163* 168*   < > 167*  --  148* 146*   POTASSIUM 5.9* 4.9 4.8 4.5  --  4.3  --  4.9 3.0*   CHLORIDE  --   --   --  112*  --  112*  --  114* 114*   CO2  --   --   --  32*  --  34*  --  16* 17*   BUN  --   --   --   61.0*  --  61.4*  --  52.1* 47.7*   CR  --   --   --  3.80*  --  3.70*  --  3.18* 2.79*   PHOS  --   --   --  10.9*  --   --   --  7.5* 2.6   MAG  --   --   --  3.1*  --   --  2.7* 3.1* 3.0*    < > = values in this interval not displayed.       Plan:  - Avoid nephrotoxins, renally dose meds  - Monitor urine output    Infectious Disease  # Aspiration Pneumonia- in the setting of arrest.  CXR/CAP as above.   Recent Labs   Lab 04/21/24  1253 04/21/24  1112 04/21/24  0950   WBC 32.2* 31.6* 30.4*     No data recorded.      Cultures thus far:   Sputum 3+ GPC   BLD NGTD   Urine P   MRSA negative  Antibiotics   Ceftriaxone and Vancomycin                  Plan:  - MRSA Nares  - Continue aspiration coverage x 5D, de escalate Vanc when able  - Monitor for signs of infection  - Avoid fevers     Hematology  # Anemia of critical illness  Recent Labs   Lab 04/21/24  1655 04/21/24  1621 04/21/24  1551 04/21/24  1253 04/21/24  1112 04/21/24  0950   HGB 9.0* 9.1* 9.9* 9.2* 9.6* 8.9*   HCT  --   --   --  27.3* 26.9* 25.5*   PLT  --   --   --  164 163 163     Recent Labs   Lab 04/21/24  0950 04/21/24  0256 04/20/24  2202   INR 1.78* 1.35* 1.40*   PTT 59* 80* 48*     Hgb stable. No e/o bleeding.  Receiving heparin for ECMO with Xa 0.30-0.70        ASA/ticagrelor for HAY  DVT PPX: Heparin as above    Plan:  - Continue Heparin with ACT goal as above  - Transfusion parameters:   - 1u PRBC for hbg < 7.0   - 1u platelet for plt < 50   - 1u FFP for INR > 3   - 5u cryoprecipitate for fibrinogen <150     Endocrinology  # Hyperglycemia   # A1C 5.3  - No known medical history.     Recent Labs   Lab 04/21/24  1655 04/21/24  1621 04/21/24  1551 04/21/24  1451 04/21/24  1417   GLC 82 105* 115* 144* 198*     Plan  - insulin gtt as needed  - D20 hypoglycemia  Integumentary:  - No skin issues    Clinically Significant Risk Factors        # Hypokalemia: Lowest K = 2.6 mmol/L in last 2 days, will replace as needed  # Hyperkalemia: Highest K = 5.9 mmol/L  "in last 2 days, will monitor as appropriate  # Hypernatremia: Highest Na = 171 mmol/L in last 2 days, will monitor as appropriate  # Hypocalcemia: Lowest iCa = 3.9 mg/dL in last 2 days, will monitor and replace as appropriate  # Hypercalcemia: Highest iCa = 5.6 mg/dL in last 2 days, will monitor as appropriate  # Hypomagnesemia: Lowest Mg = 1.3 mg/dL in last 2 days, will replace as needed  # Anion Gap Metabolic Acidosis: Highest Anion Gap = 24 mmol/L in last 2 days, will monitor and treat as appropriate  # Hypoalbuminemia: Lowest albumin = 2 g/dL at 4/21/2024 11:19 AM, will monitor as appropriate  # Coagulation Defect: INR = 1.78 (Ref range: 0.85 - 1.15) and/or PTT = 59 Seconds (Ref range: 22 - 38 Seconds), will monitor for bleeding   # Acute Kidney Injury, unspecified: based on a >150% or 0.3 mg/dL increase in last creatinine compared to past 90 day average, will monitor renal function         # Cachexia: Estimated body mass index is 15.43 kg/m  as calculated from the following:    Height as of this encounter: 1.702 m (5' 7\").    Weight as of this encounter: 44.7 kg (98 lb 8.7 oz)., PRESENT ON ADMISSION           Cardiac arrest  Acidosis, Hypokalemia, hypernatremia and Hyperosmolality , and Cachexia  Acute kidney failure, unspecified  CKD POA List: Stage 3 (unspecified if a or b) (GFR 30 - 59)  Encephalopathy: Other encephalopathy    lines/Tubes/Drains:  LDAs (Last charted value/Number of Days):        ICU  Feeding: NPO currently,TF tomorrow  Analgesia:  None  Sedation:None  Thrombopx: Heparin infusion  Head of bed: reverse trend   Ulcers: PPI  Glucose: Insulin infusion, D20    Family will be updated by me    Patient seen and discussed with staff physician Dr. Roger Cantor.    Fela Burris, PARTH CNP     "

## 2024-04-21 NOTE — BRIEF OP NOTE
[facility]    Brief Operative Note    Pre-operative diagnosis: Cardiac arrest (H) [I46.9]  Post-operative diagnosis Same as pre-operative diagnosis    Procedure: REMOVAL, CANNULA, ADULT, FOR ECMO, Right - Groin    Surgeon: Surgeons and Role:     * Ford Barnett MD - Primary     * Gely Jimenez PA-C - Assisting  Anesthesia: General   Estimated Blood Loss: 200 ml    Drains: None  Specimens: * No specimens in log *  Findings:   None.  Complications: None.  Implants: * No implants in log *

## 2024-04-21 NOTE — PLAN OF CARE
Major Shift Events: Turn down study done this morning at beside. Nicardipine briefly started, pressors back on with low MAPs and titrated back down as able. Pt taken down to OR for ECMO decannulation. Upon arriving back to unit this evening, pt MAPs low (30s-40s). No pulse was detected, CPR started. See code sheet for documentation.    MAPs started to drift again, pressors titrated to help achieve MAP goal. 2 amps Bicarb, 1g Calcium chloride and 500ml LR given under direction of MD. 1 unit PRBCs also given. Multiple critical lab results reported to writer following code, relayed to provider. Pt shifted with insulin for hyperkalemia. R internal jugular dialysis line placed at bedside for CRRT to be started this evening. US being completed on lower extremities.    BG drifting throughout day - total of 2 amps D50 given prior to ECMO decannulation.     Plan: Continue to closely monitor pt, labs, etc.  For vital signs and complete assessments, please see documentation flowsheets.

## 2024-04-21 NOTE — PROGRESS NOTES
[facility]    ECLS Discontinuation Note:     ECLS was discontinued 4/21/2024 at 1630.    Megan E. Dressler, RN  ECMO Specialist  4/21/2024 5:40 PM

## 2024-04-21 NOTE — PROCEDURES
[facility]    Central line    Date/Time: 4/21/2024 6:48 PM    Performed by: Seth Olson MD  Authorized by: Seth Olson MD  Indications: vascular access      UNIVERSAL PROTOCOL   Site Marked: Yes  Prior Images Obtained and Reviewed:  Yes  Required items: Required blood products, implants, devices and special equipment available    Patient identity confirmed:  Verbally with patient, arm band, provided demographic data, hospital-assigned identification number and anonymous protocol, patient vented/unresponsive  NA - No sedation, light sedation, or local anesthesia  Confirmation Checklist:  Patient's identity using two indicators, relevant allergies, procedure was appropriate and matched the consent or emergent situation and correct equipment/implants were available  Time out: Immediately prior to the procedure a time out was called    Universal Protocol: the Joint Commission Universal Protocol was followed    Preparation: Patient was prepped and draped in usual sterile fashion    ESBL (mL):  3     ANESTHESIA    Anesthesia:  See MAR for details      SEDATION  Patient Sedated: Yes    Sedation:  See MAR for details  Vital signs: Vital signs monitored during sedation      Preparation: skin prepped with 2% chlorhexidine, skin prepped with alcohol, skin prepped with ChloraPrep, skin prepped with Betadine, skin prepped with Hibiclens and skin prepped with povidone-iodine  Skin prep agent dried: skin prep agent completely dried prior to procedure  Sterile barriers: all five maximum sterile barriers used - cap, mask, sterile gown, sterile gloves, and large sterile sheet  Hand hygiene: hand hygiene performed prior to central venous catheter insertion  Patient position: Trendelenburg  Catheter type: double lumen  Catheter size: 9 Fr  Pre-procedure: landmarks identified  Ultrasound guidance: yes  Sterile ultrasound techniques: sterile gel and sterile probe covers were  used  Number of attempts: 1  Successful placement: yes  Post-procedure: line sutured and dressing applied  Assessment: blood return through all ports, free fluid flow, placement verified by x-ray and no pneumothorax on x-ray      PROCEDURE    Patient Tolerance:  Patient tolerated the procedure well with no immediate complications  Length of time physician/provider present for 1:1 monitoring during sedation: 15

## 2024-04-21 NOTE — PROCEDURES
Brief Cardiology Note Code Blue    On route from OR after decannulation to the ICU patient had a PEA cardiac arrest.    CPR per ACLS protocol. 2 min of CPR, 1 epi, 1 bicrab, 1 D50, 1 CaCl. ROSC.     CODE BLUE ECMO evaluation     Code Blue was called for Michael Linn.  I evaluated the patient at the bedside for potential ECPR in collaboration with the on-call ECPR staff and standard guidelines.  This patient is not eligible for ECPR due to the following concerns: ROSC after 2 minutes. Not candidate for ECMO per discussion with CICU attending.       Thank you for allowing me to care for this patient, please don't hesitate to contact me with any questions regarding this plan.   Pt was discussed and evaluated with Moses Finch*, attending physician, who agrees with the assessment and plan above.    Seth Olson MD, PhD  Cardiology Fellow

## 2024-04-21 NOTE — ANESTHESIA PREPROCEDURE EVALUATION
Anesthesia Pre-Procedure Evaluation    Patient: Elie Prado   MRN: [id number] : 1960        Procedure : Procedure(s):  REMOVAL, CANNULA, ADULT, FOR ECMO          65 yo M who was cannulated on 24 in the setting of [facility] VT/VF arrest due to culprit RCA disease s/p PCI     Shopping with brother in law, he wasn't feeling right and went to his car. His brother in law went to check on him about 2 minutes later and found him unresponsive. He called 911 at 0953, pulled him out of the car and began CPR. Medics arrived at 0957 and found him to be in VF. He was shocked a total of 6 times, given 300 amiodarone, 3 amps of EPI, and devolved to PEA. An ETT was placed at the scene and he was taken to [facility]. Total CPR time 40 minutes. Arrived at [facility] CCL 1037 with MARGUERITE going. On pump at 1044. Coronary angiogram completed showing pulsatility and culprit lesion in RCA, stent placed     No past medical history on file.   No past surgical history on file.   Allergies   Allergen Reactions    Diclofenac GI Disturbance      Social History     Tobacco Use    Smoking status: Not on file    Smokeless tobacco: Not on file   Substance Use Topics    Alcohol use: Not on file      Wt Readings from Last 1 Encounters:   24 44.7 kg (98 lb 8.7 oz)        Anesthesia Evaluation            ROS/MED HX  ENT/Pulmonary:       Neurologic:       Cardiovascular: Comment: Echo  Turndown    Interpretation Summary  Limited echo for ECMO turndown.     Baseline ECMO at 4.1LPM  LVEF 5-10%  RV function severely reduced     With turndown to 1 LPM there is slight augmentation of RV and LV function.  LVEF 10-15%  RV moderately to severely reduced.      (+)  - -  CAD -  - stent-      CHF etiology: Ischemic Last EF: 10 date: 2024 NYHA classification: IV.                Irregular Heartbeat/Palpitations (V Tach, PEA),         (-) murmur   METS/Exercise Tolerance:     Hematologic:       Musculoskeletal:       GI/Hepatic:      "  Renal/Genitourinary:       Endo:       Psychiatric/Substance Use:       Infectious Disease:       Malignancy:       Other:            Physical Exam    Airway   unable to assess          Respiratory Devices and Support    ETT:      Dental    unable to assess        Cardiovascular   cardiovascular exam normal       Rhythm and rate: regular and normal (-) no murmur    Pulmonary           breath sounds clear to auscultation           OUTSIDE LABS:  CBC:   Lab Results   Component Value Date    WBC 32.2 (H) 04/21/2024    WBC 31.6 (H) 04/21/2024    HGB 9.2 (L) 04/21/2024    HGB 9.6 (L) 04/21/2024    HCT 27.3 (L) 04/21/2024    HCT 26.9 (L) 04/21/2024     04/21/2024     04/21/2024     BMP:   Lab Results   Component Value Date     (HH) 04/21/2024     (HH) 04/21/2024    POTASSIUM 4.5 04/21/2024    POTASSIUM 4.3 04/21/2024    CHLORIDE 112 (H) 04/21/2024    CHLORIDE 112 (H) 04/21/2024    CO2 32 (H) 04/21/2024    CO2 34 (H) 04/21/2024    BUN 61.0 (H) 04/21/2024    BUN 61.4 (H) 04/21/2024    CR 3.80 (H) 04/21/2024    CR 3.70 (H) 04/21/2024    GLC 72 04/21/2024     (H) 04/21/2024     COAGS:   Lab Results   Component Value Date    PTT 59 (H) 04/21/2024    INR 1.78 (H) 04/21/2024    FIBR 485 04/21/2024     POC: No results found for: \"BGM\", \"HCG\", \"HCGS\"  HEPATIC:   Lab Results   Component Value Date    ALBUMIN 2.1 (L) 04/21/2024    PROTTOTAL 4.0 (L) 04/21/2024    ALT 1,331 (HH) 04/21/2024    AST 3,214 (HH) 04/21/2024    ALKPHOS 97 04/21/2024    BILITOTAL 1.2 04/21/2024     OTHER:   Lab Results   Component Value Date    PH 7.18 (LL) 04/21/2024    LACT 10.9 (HH) 04/21/2024    A1C 5.3 04/20/2024    IBAN 9.3 04/21/2024    PHOS 10.9 (H) 04/21/2024    MAG 3.1 (H) 04/21/2024    TSH 0.08 (L) 04/20/2024    T4 4.36 (H) 04/20/2024    SED 28 (H) 04/21/2024       Anesthesia Plan    ASA Status:  5    NPO Status:  NPO Appropriate    Anesthesia Type: General.     - Airway: ETT   Induction: Intravenous. "   Maintenance: Inhalation.   Techniques and Equipment:     - Lines/Monitors: Arterial Line, Central Line, 2nd IV, ZAIRA            ZAIRA Absolute Contra-indication: NONE            ZAIRA Relative Contra-indication: NONE     - Drips/Meds: Vasopressin, Norepi, Epinephrine     Consents    Anesthesia Plan(s) and associated risks, benefits, and realistic alternatives discussed. Questions answered and patient/representative(s) expressed understanding.     - Discussed: Risks, Benefits and Alternatives for BOTH SEDATION and the PROCEDURE were discussed     - Discussed with:  Patient      - Extended Intubation/Ventilatory Support Discussed: Yes.      - Patient is DNR/DNI Status: No     Use of blood products discussed: Yes.     - Discussed with: Patient.     Postoperative Care    Pain management: IV analgesics.   PONV prophylaxis: Ondansetron (or other 5HT-3), Dexamethasone or Solumedrol     Comments:    Other Comments: Unfortunately I was unable to discuss the family's wishes personally but had a long discussion with Dr. Duran about the family's wishes. Dr. Duran talked with the family and said they wish for the patient to be Full Code with the exception of mechanical hemodynamic support. They understand that if the patient does not do well with cessation of ECMO that no attempt to recannulate will be attempted and the family is okay with the outcome of limiting that option.    Chris Behrens M.D.  Dept. Of Anesthesiology              Christopher J. Behrens, MD    I have reviewed the pertinent notes and labs in the chart from the past 30 days and (re)examined the patient.  Any updates or changes from those notes are reflected in this note.

## 2024-04-21 NOTE — PROGRESS NOTES
ECMO Attending Progress Note  2024    63 yo M who was cannulated on 24 in the setting of [facility] VT/VF arrest due to culprit RCA disease s/p PCI     Cannulation Site:  17 Fr in the R femoral artery  25 Fr in the R femoral vein  No DRC was feasible given SFA is occluded     Interval events: LA down then started rising again early this am with worsening exam of RLE.     Physical Exam:  Temp:  [93  F (33.9  C)-98.6  F (37  C)] 98.6  F (37  C)  Pulse:  [] 112  Resp:  [10-38] 21  MAP:  [29 mmHg-347 mmHg] 62 mmHg  Arterial Line BP: ()/(54-99) 70/58  FiO2 (%):  [50 %-60 %] 60 %  SpO2:  [90 %-100 %] 100 %    Intake/Output Summary (Last 24 hours) at 2024 1328  Last data filed at 2024 1300  Gross per 24 hour   Intake 4079.97 ml   Output 2480 ml   Net 1599.97 ml    Vent Mode: CMV/AC  (Continuous Mandatory Ventilation/ Assist Control)  FiO2 (%): 60 %  Resp Rate (Set): 10 breaths/min  Tidal Volume (Set, mL): 450 mL  PEEP (cm H2O): 5 cmH2O  Resp: 21       Labs:  Recent Labs   Lab 24  1245 24  1112 24  0950 24  0741   PH 7.18* 7.55* 7.64* 7.45   PCO2 93* 44 38 40   PO2 103 49* 97 127*   HCO3 35* 38* 41* 28   O2PER 60 50 50 50      Recent Labs   Lab 24  1253 24  1112 24  0950 24  0256   WBC 32.2* 31.6* 30.4* 31.2*   HGB 9.2* 9.6* 8.9* 11.3*     Creatinine   Date Value Ref Range Status   2024 3.70 (H) 0.67 - 1.17 mg/dL Final   2024 3.18 (H) 0.67 - 1.17 mg/dL Final   2024 2.79 (H) 0.67 - 1.17 mg/dL Final   2024 2.64 (H) 0.67 - 1.17 mg/dL Final       Blood Flow (Circuit) LPM: 4.75 LPM  Sweep LPM: (S) 3 LPM  Sweep FiO2   %: 50 %  ACT  (seconds): 209 seconds  Arterial Blood Temp  (degrees C): 37 C  Pulse Oximetry  (SpO2%): 96 %  Arterial Pressure  mmH mmHg      ECMO Issues including assessments and plan on DOS 2024:  Neuro: Sedated for mechanical ventilation and ECMO- start fentanyl and wean versed as able.  No acute distress.   NIRS stable  b/l  RASS goal: -2  CV: Cardiogenic shock.  Hemodynamically stable on vaso at weaning doses  Pulm: Keep vent settings at rest settings as above.  FEN/Renal: hx of CKD Electrolytes stable w/ replacement protocols in place, Cr stable, UOP stable  Heme: ACT goal: 180-200- transition to high intensity ggt with Xa monitoring to ensure heparin effect with ALI, Hemoglobin stable .  Minimal oozing around the ECMO cannulas.  ID: Receiving empiric antibiotics  Cannulae: Position is acceptable on exam and the available imaging.DULCE hernandez vascular surgery consulted on heparin and bear hugger in place     I have personally reviewed the ECMO flows, oxygenation and CO2 clearance, anticoagulation, and cannula position.  I have also personally assessed the patient's systemic response with hemodynamics, oxygenation, ventilation, and bleeding.       The patient requires continued ECMO support and management in the ICU    Sydni Cantor MD  Cardiology critical care  Pager

## 2024-04-21 NOTE — ANESTHESIA PROCEDURE NOTES
Perioperative ZAIRA Procedure Note    Staff -        Anesthesiologist:  Behrens, Christopher J, MD       Performed By: anesthesiologist  Preanesthesia Checklist:  Patient identified, IV assessed, risks and benefits discussed, monitors and equipment assessed, procedure being performed at surgeon's request and anesthesia consent obtained.    ZAIRA Probe Insertion    Probe Status PRE Insertion: NO obvious damage  Probe type:  Adult 2D  Bite block used:   Soft  Insertion Technique: Jaw Lift  Insertion complications: None obvious  Billing Report:ZAIRA report by Anesthesiologist (See Separate Report note)  Probe Status POST Removal: NO obvious damage    ZAIRA Report  General Procedure Information  Images for this study have been archived.  Modalities: 2D and Color flow mapping  Echocardiographic and Doppler Measurements  Right Ventricle:  Cavity size normal.    Hypertrophy not present.   Thrombus not present.    Global function severely impaired.   Ejection Fraction 5%.    Left Ventricle:  Cavity size normal.    Hypertrophy not present.   Thrombus not present.   Global Function severely impaired.   Ejection Fraction 5%.      Ventricular Regional Function:  1- Basal Anteroseptal:  hypokinetic  2- Basal Anterior:  hypokinetic  3- Basal Anterolateral:  hypokinetic  4- Basal Inferolateral:  hypokinetic  5- Basal Inferior:  hypokinetic  6- Basal Inferoseptal:  hypokinetic  7- Mid Anteroseptal:  hypokinetic  8- Mid Anterior:  hypokinetic  9- Mid Anterolateral:  hypokinetic  10- Mid Inferolateral:  hypokinetic  11- Mid Inferior:  hypokinetic  12- Mid Inferoseptal:  hypokinetic  13- Apical Anterior:  hypokinetic  14- Apical Lateral:  hypokinetic  15- Apical Inferior:  hypokinetic  16- Apical Septal:  hypokinetic  17- [place]:  hypokinetic    Valves  Aortic Valve: Annulus calcified.  Stenosis not present.  Regurgitation absent.  Leaflets normal.  Leaflet motions normal.    Mitral Valve: Annulus normal.  Stenosis not present.  Regurgitation  absent.  Leaflets normal.  Leaflet motions normal.    Tricuspid Valve: Annulus normal.  Stenosis not present.  Regurgitation absent.  Leaflets normal.  Leaflet motions normal.    Pulmonic Valve: Annulus normal.  Stenosis not present.  Regurgitation absent.      Aorta: Ascending Aorta: Size normal.  Dissection not present.  Plaque thickness greater than 3 mm.  Mobile plaque not present.    Aortic Arch: Size normal.    Dissection not present.   Plaque thickness greater than 3 mm.   Mobile plaque not present.    Descending Aorta: Size normal.    Dissection not present.   Plaque thickness greater than 3 mm.   Mobile plaque not present.      Right Atrium:  Size normal.   Spontaneous echo contrast not present.   Thrombus not present.   Tumor not present.   Device not present.     Left Atrium: Size normal.  Spontaneous echo contrast not present.  Thrombus not present.  Tumor not present.  Device not present.    Left atrial appendage normal.     Atrial Septum: Intra-atrial septal morphology normal.       Ventricular Septum: Intra-ventricular septum morphology normal.          Post Intervention Findings  Procedure(s) performed:  ECMO Decannulation. Global function:  Unchanged. Regional wall motion: Unchanged. Surgeon(s) notified of all postintervention findings: Yes.                 Echocardiogram Comments  Echocardiogram comments: LV: Normal ventricle size with severely global function, EF 5-10%.   AV: Mildly calcified leaflet morphology and with normal function. No stenosis suspected, no regurgitation noted.  MV: Normal leaflet morphology and function. No stenosis suspected, no regurgitation noted.  RV: Normal ventricular size with severely reduced function. EF 5-10%  TV: Normal leaflet morphology and function. No stenosis suspected with trace regurgitation noted.  PV:Normal leaflet morphology and function. No stenosis suspected, no regurgitation noted.  Aorta: Diameter within normal limits. No evidence of aneurysm,  hematoma or dissection. Grade 4 atherosclerotic disease.  .

## 2024-04-22 VITALS
TEMPERATURE: 99 F | DIASTOLIC BLOOD PRESSURE: 64 MMHG | RESPIRATION RATE: 20 BRPM | WEIGHT: 98.55 LBS | OXYGEN SATURATION: 57 % | HEIGHT: 67 IN | BODY MASS INDEX: 15.47 KG/M2 | SYSTOLIC BLOOD PRESSURE: 115 MMHG

## 2024-04-22 LAB
ALBUMIN SERPL BCG-MCNC: 1.4 G/DL (ref 3.5–5.2)
ALP SERPL-CCNC: 80 U/L (ref 40–150)
ALT SERPL W P-5'-P-CCNC: 896 U/L (ref 0–70)
ANION GAP SERPL CALCULATED.3IONS-SCNC: 17 MMOL/L (ref 7–15)
AST SERPL W P-5'-P-CCNC: 2746 U/L (ref 0–45)
ATRIAL RATE - MUSE: 104 BPM
ATRIAL RATE - MUSE: 115 BPM
ATRIAL RATE - MUSE: 131 BPM
ATRIAL RATE - MUSE: NORMAL BPM
BACTERIA UR CULT: ABNORMAL
BASE EXCESS BLDA CALC-SCNC: 11 MMOL/L (ref -3–3)
BILIRUB SERPL-MCNC: 1.5 MG/DL
BUN SERPL-MCNC: 49.9 MG/DL (ref 8–23)
CA-I BLD-MCNC: 6.2 MG/DL (ref 4.4–5.2)
CALCIUM SERPL-MCNC: 10 MG/DL (ref 8.8–10.2)
CHLORIDE SERPL-SCNC: 110 MMOL/L (ref 98–107)
CPB POCT: NO
CREAT SERPL-MCNC: 3.09 MG/DL (ref 0.67–1.17)
DEPRECATED HCO3 PLAS-SCNC: 36 MMOL/L (ref 22–29)
DIASTOLIC BLOOD PRESSURE - MUSE: NORMAL MMHG
EGFRCR SERPLBLD CKD-EPI 2021: 22 ML/MIN/1.73M2
GLUCOSE BLD-MCNC: 163 MG/DL (ref 70–99)
GLUCOSE BLDC GLUCOMTR-MCNC: 165 MG/DL (ref 70–99)
GLUCOSE SERPL-MCNC: 44 MG/DL (ref 70–99)
HCO3 BLDA-SCNC: 39 MMOL/L (ref 21–28)
HCT VFR BLD CALC: <15 % (ref 40–53)
HGB BLD-MCNC: <5.1 G/DL (ref 13.3–17.7)
INTERPRETATION ECG - MUSE: NORMAL
INTERPRETATION TEGPIA: NORMAL
NSE SERPL IA-MCNC: 38.7 NG/ML
NSE SERPL IA-MCNC: 44.1 NG/ML
P AXIS - MUSE: 60 DEGREES
P AXIS - MUSE: 75 DEGREES
P AXIS - MUSE: 85 DEGREES
P AXIS - MUSE: NORMAL DEGREES
PA AA BLD-ACNC: 99 %
PA ADP BLD-ACNC: 100 %
PCO2 BLDA: 96 MM HG (ref 35–45)
PH BLDA: 7.21 [PH] (ref 7.35–7.45)
PO2 BLDA: 81 MM HG (ref 80–105)
POTASSIUM BLD-SCNC: 9 MMOL/L (ref 3.4–5.3)
POTASSIUM SERPL-SCNC: 9.7 MMOL/L (ref 3.4–5.3)
PR INTERVAL - MUSE: 122 MS
PR INTERVAL - MUSE: 128 MS
PR INTERVAL - MUSE: 144 MS
PR INTERVAL - MUSE: NORMAL MS
PROT SERPL-MCNC: 2.5 G/DL (ref 6.4–8.3)
QRS DURATION - MUSE: 100 MS
QRS DURATION - MUSE: 156 MS
QRS DURATION - MUSE: 86 MS
QRS DURATION - MUSE: 98 MS
QT - MUSE: 342 MS
QT - MUSE: 344 MS
QT - MUSE: 344 MS
QT - MUSE: 446 MS
QTC - MUSE: 452 MS
QTC - MUSE: 473 MS
QTC - MUSE: 507 MS
QTC - MUSE: 572 MS
R AXIS - MUSE: 109 DEGREES
R AXIS - MUSE: 94 DEGREES
R AXIS - MUSE: 95 DEGREES
R AXIS - MUSE: 98 DEGREES
SAO2 % BLDA: 92 % (ref 92–100)
SODIUM BLD-SCNC: 155 MMOL/L (ref 135–145)
SODIUM SERPL-SCNC: 163 MMOL/L (ref 135–145)
SYSTOLIC BLOOD PRESSURE - MUSE: NORMAL MMHG
T AXIS - MUSE: -12 DEGREES
T AXIS - MUSE: 245 DEGREES
T AXIS - MUSE: 35 DEGREES
T AXIS - MUSE: 70 DEGREES
VENTRICULAR RATE- MUSE: 104 BPM
VENTRICULAR RATE- MUSE: 115 BPM
VENTRICULAR RATE- MUSE: 131 BPM
VENTRICULAR RATE- MUSE: 99 BPM

## 2024-04-22 ASSESSMENT — ACTIVITIES OF DAILY LIVING (ADL)
ADLS_ACUITY_SCORE: 51
ADLS_ACUITY_SCORE: 51

## 2024-04-22 NOTE — PROGRESS NOTES
Patient  with family at bedside after code status change to DNR.     Remaining patient belongings sent home with wife.    Patient sent to [facility] [place].    Refer to physician note for further details.    Time of death: 24. 7920

## 2024-04-22 NOTE — PROGRESS NOTES
Vascular Surgery Progress Note    Assessed patient post VA ECMO decannulation and subsequent cardiac arrest en route to ICU. ROSC after 2 minutes CPR, 1 epi, 1 bicarb, 1 D50, 1CaCl.     Patient with extensive mottling of RLE with evidence of ischemic changes to level of upper thigh, pictures as below. LLE now also with some mild mottling.     Monophasic R EIA, CFA signals and weak monophasic  proximal R SFA signal, no signal in popliteal.   LLE with triphasic waveform in L femoral art line, some flow heard in L popliteal artery  vs. Venous flow however no phasicity appreciated.      LUE with biphasic radial artery signal, RUE with dampened waveform in radial artery artline, monophasic ulnar signal appreciated. BUE with warm hands.     BLE Duplex obtained post decann demonstrates on RLE monophasic waveforms and flow to level of SFA, no flow in popliteal artery or distally. LLE with no flow at all identified distal to arterial line in femoral artery.       Notably, patient hypothermic at 95 degrees, with hyperkalemia of 8.3, lactic acid of 19, pH of 7.07 after decannulation and subsequent arrest. Currently on 0.25 Epi, 0.3 NE, and 4 of vasopressin with increasing requirements. CRRT being initiated at this time. Per cardiology fellow bedside, if patient were to code again, patient is not candidate for ECMO recannulation.     RLE:             RLE lateral thigh/hip. Prox L.   RLE anterior thigh, proximal screen left                RLE medial thigh              Patient's RLE is nonsalvageable. If he were to stabilize, he will require at least a high above knee amputation in coming days. Currently, he with reperfusion of the R leg he has had return of potassium, with subsequent hyperkalemia, however lactic acid of 19 not likely from ischemic legs alone, likely related to global ischemic time during arrest.     To stop reperfusion injury, definitive operation would be amputation, however he would not tolerate this in OR at  this time. We also do not feel he would survive bedside guillotine amputation of RLE at this time given ongoing metabolic derangements and previous code en route from ICU, as well as anticipated blood loss from amputation which would worsen current clinical picture.     RLE tourniquet placed to decrease reperfusion as much as possible.     Additionally, LLE is also now ischemic, unclear cause. He may be embolizing from more proximal source or have had thrombosis in setting of low flow with art line in place during arrest. He would not tolerate revascularization at this time and we risk further reperfusion injury in attempts. He is at high risk progressing to limb loss at level of above knee amputation on this leg, however will continue to monitor.     Patient should be on high intensity heparin drip as soon as possible, continue warming legs.  Recommend removal art line from Left femoral artery as soon as able.     Discussed above with patient's wife, who expressed understanding of dire situation regarding limbs. At this time, she was agreeable to tourniquet on RLE to try to decrease injury from reperfusion to rest of body understanding that this is a bridge to amputation, if he were to stabilize, and that there is nothing we can offer for LLE at this time and that he may require amputation of this leg as well. She did tell me at this time that she did wish to continue efforts at resuscitation.     Discussed with cardiology fellow, Dr. Samuel at bedside.     Discussed with Dr. Lomax, who is in agreement with the above.     Wilian Carvalho MD   Vascular Surgery PGY3

## 2024-04-22 NOTE — OP NOTE
Date of Service: April 21st, 2024    Preoperative Diagnosis: cardiogenic shock    Postoperative Diagnosis: cardiogenic shock    Surgeon: Ford Barnett MD    Assistant: VINCE Horton    Name of Operation: ECMO decannulation, right groin cutdown and femoral arteriotomy closure, intraoperative ZAIRA    Anesthesia: general orotracheal      Indications for Procedure: Mr. Prado is a 64yo M who was placed on right groin percutaneous peripheral VA ECMO support for cardiac arrest. He unfortunately developed right leg ischemia. The decision was made to decannulate him from ECMO support.    Description of the Procedure: After informed consent was obtained from the family, the patient was brought down to the OR and was placed on the OR table in the supine position. He was prepped and draped in our usual sterile fashion. A right groin cutdown was made. 5,000 units of IV heparin was given. The patient was then  from VA ECMO support. The arterial and venous cannulae were removed and the arteriotomy and venotomy sites were closed using 5-0 Prolene pursestring sutures. Several 5-0 Prolene repair sutures were used to obtain hemostasis. The right groin incision was irrigated out and closed in layers of running Vicryl suture. The skin was closed using running 3-0 Prolene and was sealed using DermaBond.    There were no intraoperative complications. The patient was brought back to the OR in critical condition.    Ford Barnett MD

## 2024-04-22 NOTE — PROGRESS NOTES
CRRT INITIATION NOTE    Consent for CRRT Completed:  YES  Patient s Vascular Access: Catheter              Placement Confirmed: YES  Manufacture:  [employer]  Model:  Mis Carter  Length/Albanian Size:  20cm, 12 Fr  Flush Volume:  1.4/1.4mL    DATA:  Procedure:  Initiation  Start Time:  2042  Machine#:  9  Filter:    Blood Flow:  180  ML/min  Pre-Replacement Solution: PrismaSol BGK 2/3.5   Post-Replacement Solution: Sodium Bicarbonate in water 150 mEq/L   Dialysate Solution:  PrismaSol BGK 2/3.5  Pre-Replacement Solution Rate:  600 mL/hr  Post-Replacement Solution Rate:  200 mL/hr  Dialysate Flow Rate:  600 mL/hr   Patient Removal Rate:  0 mL/hr  Anticoagulation Type and Rate:  N/A    ASSESSMENT:  How Patient Tolerated Initiation:   Vital Signs:  77, 105/49 (63)  Initial Pressures:  Access:  -35  Filter:  113  Return:  45  TMP:  17  Change in Filter Pressure:  33      INTERVENTIONS:  Initiated on CRRT with temporary post-NaCl d/t pharmacy delay for post-HCO3    PLAN:  Continue to monitor.

## 2024-04-23 LAB
BACTERIA SPT CULT: ABNORMAL
ENTEROCOCCUS FAECALIS: NOT DETECTED
ENTEROCOCCUS FAECIUM: NOT DETECTED
GRAM STAIN RESULT: ABNORMAL
LISTERIA SPECIES (DETECTED/NOT DETECTED): NOT DETECTED
NSE SERPL IA-MCNC: 51.9 NG/ML
STAPHYLOCOCCUS AUREUS: NOT DETECTED
STAPHYLOCOCCUS EPIDERMIDIS: NOT DETECTED
STAPHYLOCOCCUS LUGDUNENSIS: NOT DETECTED
STAPHYLOCOCCUS SPECIES: DETECTED
STREPTOCOCCUS AGALACTIAE: NOT DETECTED
STREPTOCOCCUS ANGINOSUS GROUP: NOT DETECTED
STREPTOCOCCUS PNEUMONIAE: NOT DETECTED
STREPTOCOCCUS PYOGENES: NOT DETECTED
STREPTOCOCCUS SPECIES: NOT DETECTED

## 2024-04-23 NOTE — ANESTHESIA POSTPROCEDURE EVALUATION
Patient: Elie Prado    Procedure: Procedure(s):  REMOVAL, CANNULA, ADULT, FOR ECMO       Anesthesia Type:  General    Note:  Disposition: ICU            ICU Sign Out: Anesthesiologist/ICU physician sign out WAS performed   Postop Pain Control: Uneventful            Sign Out: Well controlled pain   PONV: No   Neuro/Psych: Uneventful            Sign Out: Acceptable/Baseline neuro status   Airway/Respiratory: Uneventful            Sign Out: AIRWAY IN SITU/Resp. Support               Airway in situ/Resp. Support: ETT                 Reason: Planned Pre-op   CV/Hemodynamics: Uneventful            Sign Out: Acceptable CV status   Other NRE: NONE   DID A NON-ROUTINE EVENT OCCUR? No           Last vitals:  Vitals:    04/21/24 2330 04/21/24 2345 04/22/24 0000   BP:      Pulse: 98 106 (!) 0   Resp: 22 22 20   Temp: 37.2  C (99  F) 37.2  C (99  F) 37.2  C (99  F)   SpO2:          Electronically Signed By: Christopher J. Behrens, MD  April 23, 2024  8:46 AM

## 2024-04-24 LAB
S100 CA BINDING PROTEIN B SER-MCNC: 1541 NG/L
S100 CA BINDING PROTEIN B SER-MCNC: 2247 NG/L
S100 CA BINDING PROTEIN B SER-MCNC: 4066 NG/L

## 2024-04-25 LAB
BACTERIA BLD CULT: NO GROWTH
BACTERIA BLD CULT: NO GROWTH

## 2024-04-26 LAB — BACTERIA BLD CULT: NO GROWTH

## 2024-04-28 LAB
BACTERIA BLD CULT: ABNORMAL
BACTERIA BLD CULT: ABNORMAL

## 2024-04-29 LAB
6MAM SERPL-MCNC: NEGATIVE NG/ML
7AMINOCLONAZEPAM SERPL-MCNC: NEGATIVE NG/ML
ALPRAZ SERPL-MCNC: NEGATIVE NG/ML
AMPHET BLD CFM-MCNC: NEGATIVE NG/ML
APAP BLD-MCNC: NEGATIVE UG/ML
BARBITURATES SPEC-MCNC: NEGATIVE UG/ML
BENZODIAZ SPEC QL: POSITIVE
BENZODIAZ SPEC-MCNC: ABNORMAL NG/ML
BUPRENORPHINE SERPL-MCNC: NEGATIVE NG/ML
BZE BLD CFM-MCNC: NEGATIVE NG/ML
CARBOXYTHC BLD-MCNC: NEGATIVE NG/ML
CARISOPRODOL IA: NEGATIVE UG/ML
CHLORDIAZEP SERPL-MCNC: NEGATIVE NG/ML
CLONAZEPAM SERPL-MCNC: NEGATIVE NG/ML
CODEINE SERPL-MCNC: NEGATIVE NG/ML
DECLARED MEDICATIONS: ABNORMAL
DESALKYLFLURAZ SERPL CFM-MCNC: NEGATIVE NG/ML
DHC SERPLBLD CFM-MCNC: NEGATIVE NG/ML
DIAZEPAM SERPL-MCNC: NEGATIVE NG/ML
DRUGS BLD SCN NOM: ABNORMAL
DRUGS FLD: ABNORMAL
ETHANOL BLD-MCNC: NEGATIVE GM/DL
FENTANYL BLD CFM-MCNC: 1.1 NG/ML
FENTANYL IA: ABNORMAL NG/ML
FENTANYL SPEC QL: POSITIVE
FLURAZEPAM SPEC-MCNC: NEGATIVE NG/ML
GABAPENTIN IA: NEGATIVE UG/ML
HYDROCODONE SERPL-MCNC: NEGATIVE NG/ML
HYDROMORPHONE SERPL-MCNC: NEGATIVE NG/ML
LORAZEPAM SERPL-MCNC: NEGATIVE NG/ML
MEPERIDINE SERPLBLD-MCNC: NEGATIVE NG/ML
METHADONE SAL CFM-MCNC: NEGATIVE NG/ML
MIDAZOLAM SERPL-MCNC: 194.1 NG/ML
MORPHINE SERPL-MCNC: NEGATIVE NG/ML
NORCHLORDIAZEP SERPL-MCNC: NEGATIVE UG/ML
NORDIAZEPAM SPEC-MCNC: NEGATIVE NG/ML
NORFENTANYL BLD CFM-MCNC: NEGATIVE NG/ML
OPIATES SPEC QL: NEGATIVE
OPIATES SPEC-MCNC: NEGATIVE NG/ML
OXAZEPAM SERPL CFM-MCNC: NEGATIVE NG/ML
OXYCODONE SERPL-MCNC: POSITIVE NG/ML
OXYCODONE SERPLBLD CFM-MCNC: 51.1 NG/ML
OXYCODONE SERPLBLD SCN-MCNC: ABNORMAL NG/ML
OXYMORPHONE SERPL-MCNC: NEGATIVE NG/ML
PCP SPEC-MCNC: NEGATIVE NG/ML
PROPOXYPH SPEC-MCNC: NEGATIVE NG/ML
TEMAZEPAM SERPL-MCNC: NEGATIVE NG/ML
TRAMADOL BLD-MCNC: NEGATIVE NG/ML
TRIAZOLAM SPEC-MCNC: NEGATIVE NG/ML

## 2024-04-29 NOTE — DEATH PRONOUNCEMENT
MD DEATH PRONOUNCEMENT    Called to pronounce Elie KARI Prado dead.    Physical Exam: Unresponsive to noxious stimuli, Spontaneous respirations absent, Breath sounds absent, Carotid pulse absent, Heart sounds absent, Pupillary light reflex absent, and Corneal blink reflex absent    Patient was pronounced dead at 11:59 PM, 2024.    Preliminary Cause of Death: Acute limb ischemia secondary to significant PAD    Active Problems:    Cardiac arrest (H)       Infectious disease present?: NO    Communicable disease present? (examples: HIV, chicken pox, TB, Ebola, CJD) :  NO    Multi-drug resistant organism present? (example: MRSA): NO    Please consider an autopsy if any of the following exist:  NO Unexpected or unexplained death during or following any dental, medical, or surgical diagnostic treatment procedures.   NO Death of mother at or up to seven days after delivery.     NO All  and pediatric deaths.     NO Death where the cause is sufficiently obscure to delay completion of the death certificate.   NO Deaths in which autopsy would confirm a suspected illness/condition that would affect surviving family members or recipients of transplanted organs.     The following deaths must be reported to the 's Office:  NO A death that may be due entirely or in part to any factors other than natural disease (recent surgery, recent trauma, suspected abuse/neglect).   NO A death that may be an accident, suicide, or homicide.     NO Any sudden, unexpected death in which there is no prior history of significant heart disease or any other condition associated with sudden death.   NO A death under suspicious, unusual, or unexpected circumstances.    NO Any death which is apparently due to natural causes but in which the  does not have a personal physician familiar with the patient s medical history, social, or environmental situation or the circumstances of the terminal event.   NO Any death apparently  due to Sudden Infant Death Syndrome.     NO Deaths that occur during, in association with, or as consequences of a diagnostic, therapeutic, or anesthetic procedure.   NO Any death in which a fracture of a major bone has occurred within the past (6) six months.   NO A death of persons note seen by their physician within 120 days of demise.     NO Any death in which the  was an inmate of a public institution or was in the custody of Law Enforcement personnel.   NO  All unexpected deaths of children   NO Solid organ donors   NO Unidentified bodies   NO Deaths of persons whose bodies are to be cremated or otherwise disposed of so that the bodies will later be unavailable for examination;   NO Deaths unattended by a physician outside of a licensed healthcare facility or licensed residential hospice program   NO Deaths occurring within 24 hours of arrival to a health care facility if death is unexpected.    NO Deaths associated with the decedent s employment.   NO Deaths attributed to acts of terrorism.   NO Any death in which there is uncertainty as to whether it is a medical examiner s care should be discussed with the medical investigator.        Body disposition: Autopsy was discussed with family member:  Spouse in person.  Permission for autopsy was declined.

## 2024-04-29 NOTE — DISCHARGE SUMMARY
[address] [address]  p: [phone]    Discharge Summary: Cardiology Service    Elie Prado MRN# [id number]   YOB: 1960 Age: 63 year old       Admission Date: 4/20/2024  Discharge Date: 04/21/24      Discharge Diagnoses:  1. Refractory VF cardiac arrest  2. Cardiogenic shock  3. CAD  4. Cardiomyopathy  5. Acute hypoxic respiratory failure  6. Acute liver injury  7. Acute on chronic kidney injury  8. Lactic acidosis  9. Acute limb ischemia    Pertinent Procedures:  1. Angiogram  2. ECMO cannulation  3. ECMO decannulation    Consults:  Neurology  Vascular surgery  Nephrology  Cardiothoracic surgery    Imaging with results:  Echocardiogram :   Left ventricular function is severely reduced. The ejection fraction is 5-10%.  Global right ventricular function is severely reduced.  Severe global hypokinesis with sparing of the RV and LV apex. Consider  pulmonary embolism. Could also be a form of stress cardiomyopathy    Coronary Angiogram :  Culprit RCA stent     Other imaging studies:  CT head 4/20/2024: No acute intracranial pathology. Old lacunar infarct in the left basal ganglia. White matter changes  of likely chronic small vessel ischemic diseas      Brief HPI:  Derrick Gregorio [place] is a 64 year old male being admitted to the CICU on s/p refractory VF cardiac arrest requiring V-A ECMO. The patient has a PMHx of Chronic renal disease and percocet dependence. Brought to the [facility] and angiogram revealed RCA culprit lesion      Shopping with brother in law, he wasn't feeling right and went to his car. His brother in law went to check on him about 2 minutes later and found him unresponsive. He called 911 at 0953, pulled him out of the car and began CPR. Medics arrived at 0957 and found him to be in VF. He was shocked a total of 6 times, given 300 amiodarone, 3 amps of EPI, and devolved to PEA. An ETT was placed at the scene and  he was taken to [facility]. Total CPR time 40 minutes. Arrived at [facility] CCL 1037 with MARGUERITE going. On pump at 1044. Coronary angiogram completed showing pulsatility and culprit lesion in RCA, stent placed    Hospital Course by Diagnosis:  R leg without perfusion since cannulation , RLE purple. Placement of distal perfusion catheter was not possible due to severe calcified PAD. Warmer placed on leg, pressors weaned off, and heparin goal increased to high intensity heparin protocol using Xa to adjust heparin to maximize flow to leg. Bicarb infusion started over the night. Over the morning lactic rising, and sodium rising on bicarb infusion. Nephrology consulted and will initiate CRRT. Will plan for Urgent decannulation of ECMO if we can get him stable enough. Per Vascular will need amputation of ischemic limb, discussed with family will discuss again with them if amputation is needed urgently     Sent to OR at 1600 and successfully Decannulated, however in cardiac arrest on return to ICU. ROSC achieved, but remained hemodynamically unstable, family made DNR and patient passed away at 8901    # Cachexia-per wife has lost 50% of weight in last year  # Refractory VF Cardiac arrest   # Cardiogenic shock requiring VA ECMO  # CAD  # Cardiomyopathy  # Dyslipidemia  # Acute hypoxemic respiratory failure requiring intubation  # Aspiration pneumonia  # Shock liver 2/2 cardiac arrest   # Chronic kidney disease stage III  # Acute on chronic Renal Injury 2/2 cardiac arrest  # Lactic acidosis  # Anemia of critical illness  # Acute Limb ischemia  # PAD     Condition on discharge       Code status:  DNR    Demetrice Burris XANDER PONCEN [facility]  Cardiology ICU  Pager [phone]    Patient Care Team:  Fermin Whitney MD as PCP - General (Family Medicine)  Yunior Weaver MD as Physician (Endocrinology, Diabetes, and Metabolism)  Doug Crawford MD as Assigned Endocrinology Provider  Fermin Whitney MD (Family Medicine)

## (undated) DEVICE — PREP POVIDONE-IODINE 10% SOLUTION 4OZ BOTTLE MDS093944

## (undated) DEVICE — Device

## (undated) DEVICE — WIRE GUIDE 0.035"X145CM AMPLATZ XSTIFF J THSCF-35-145-3-AES

## (undated) DEVICE — BALLOON EXTRACTION 15X1950MM 3.2MM TL B-V243Q-A

## (undated) DEVICE — ESU GROUND PAD ADULT W/CORD E7507

## (undated) DEVICE — PREP POVIDONE-IODINE 7.5% SCRUB 4OZ BOTTLE MDS093945

## (undated) DEVICE — COVER CAMERA IN-LIGHT LENS LT-C02-P

## (undated) DEVICE — CATH BALLOON NC EMERGE 3.50X15MM H7493926715350

## (undated) DEVICE — PACK HEART LEFT CUSTOM

## (undated) DEVICE — GLOVE BIOGEL PI MICRO SZ 7.5 48575

## (undated) DEVICE — SU VICRYL 0 CTX 36" J370H

## (undated) DEVICE — WIRE GUIDE HI-TRQ  WHISPER MS JTIP 0.014"X190CM 1005357HJ

## (undated) DEVICE — DRSG DRAIN 4X4" 7086

## (undated) DEVICE — CATH ANGIO SUPERTORQUE PLUS JL4 6FRX100CM 533620

## (undated) DEVICE — CATH ANGIO INFINITI 3DRC 6FRX100CM 534676T

## (undated) DEVICE — CATH BALLOON NC EMERGE 2.50X15MM H7493926715250

## (undated) DEVICE — ESU GROUND PAD ADULT REM W/15' CORD E7507DB

## (undated) DEVICE — PITCHER STERILE 1000ML  SSK9004A

## (undated) DEVICE — CANISTER WOUND VAC W/GEL 1000ML M8275093/5

## (undated) DEVICE — LINEN TOWEL PACK X30 5481

## (undated) DEVICE — ENDO FUSION OMNI-TOME G31903

## (undated) DEVICE — SPONGE KITTNER 30-101

## (undated) DEVICE — DEFIB PRO-PADZ LVP LQD GEL ADULT 8900-2105-01

## (undated) DEVICE — LINEN TOWEL PACK X6 WHITE 5487

## (undated) DEVICE — MANIFOLD KIT ANGIO AUTOMATED 014613

## (undated) DEVICE — DRAPE IOBAN INCISE 23X17" 6650EZ

## (undated) DEVICE — DRAPE TIBURON CARDIOVASCULAR PERI-GROIN LF 9154

## (undated) DEVICE — CATH GUIDING BLUE YELLOW PTFE JR4 6FRX100CM 67008200

## (undated) DEVICE — KIT ARTERIAL LINE 2GA 12CM INTRO NDL ASK-04510-HF

## (undated) DEVICE — WIRE GUIDE 0.35X270CM STRAIGHT TIP VISIGLIDE G-260-3527S

## (undated) DEVICE — SU DERMABOND ADVANCED .7ML DNX12

## (undated) DEVICE — CLIP HORIZON MED BLUE 002200

## (undated) DEVICE — PROTECTOR ARM ONE-STEP TRENDELENBURG 40418

## (undated) DEVICE — SU PLEDGET SOFT TFE 3/8"X3/26"X1/16" PCP40

## (undated) DEVICE — SUCTION MANIFOLD NEPTUNE 2 SYS 4 PORT 0702-020-000

## (undated) DEVICE — SU SILK 0 TIE 6X30" A306H

## (undated) DEVICE — CLIP HORIZON SM RED WIDE SLOT 001201

## (undated) DEVICE — GUIDEWIRE VASC 0.014INX180CM RUNTHROUGH 25-1011

## (undated) DEVICE — INTRO SHEATH 8FRX10CM PINNACLE RSS802

## (undated) DEVICE — SUTURE BOOTS 051003PBX

## (undated) DEVICE — LABEL MEDICATION SYSTEM 3303-P

## (undated) DEVICE — INTRO SHTH INTRO SHTH 8FR X 11

## (undated) DEVICE — CANNULA VENOUS 25FR LONG

## (undated) DEVICE — TOURNIQUET VASCULAR KIT 7 1/2" 79012

## (undated) DEVICE — KIT DVC ANGIO IBASIXCOMPAK 13INX20ML 3WAY IN4430

## (undated) DEVICE — SYR 30ML LL W/O NDL 302832

## (undated) DEVICE — SUCTION MANIFOLD NEPTUNE 2 SYS 1 PORT 702-025-000

## (undated) DEVICE — INTRO SHEATH 6FRX25CM PINNACLE RSS606

## (undated) DEVICE — KIT HAND CONTROL ACIST 016795

## (undated) DEVICE — SYR 10ML LL W/O NDL 302995

## (undated) DEVICE — CANNULA ART 17FR 3/8IN 23CM 2 SH BIOLINE 70105.3082

## (undated) DEVICE — VALVE HEMOSTASIS .096" COPILOT MECH 1003331

## (undated) DEVICE — PREP DYNA-HEX 4% CHG SCRUB 4OZ BOTTLE MDS098710

## (undated) DEVICE — SU PROLENE 6-0 C-1DA 30" 8706H

## (undated) DEVICE — VESSEL LOOPS YELLOW MAXI 31145694

## (undated) DEVICE — TUBING SUCTION MEDI-VAC 1/4"X20' N620A

## (undated) DEVICE — SU PROLENE 5-0 RB-2DA 30" 8710H

## (undated) DEVICE — SU VICRYL 3-0 FS-1 27" J442H

## (undated) DEVICE — PREP SKIN SCRUB TRAY 4461A

## (undated) DEVICE — DRAPE SHEET REV FOLD 3/4 9349

## (undated) DEVICE — SOL NACL 0.9% IRRIG 1000ML BOTTLE 2F7124

## (undated) DEVICE — CATH ANGIO INFINITI PIGTAIL 145 6 SH 6FRX110CM  534-652S

## (undated) DEVICE — CATH BALLOON EMERGE 2.0X12MM H7493918912200

## (undated) DEVICE — TAPE MEDIPORE 4"X2YD 2864

## (undated) DEVICE — BLADE CLIPPER SGL USE 9680

## (undated) DEVICE — DRSG TELFA 3X8" 1238

## (undated) DEVICE — INTRO SHEATH MICRO PLATINUM TIP 4FRX40CM 7274

## (undated) DEVICE — SOL WATER IRRIG 1000ML BOTTLE 2F7114

## (undated) RX ORDER — FENTANYL CITRATE 50 UG/ML
INJECTION, SOLUTION INTRAMUSCULAR; INTRAVENOUS
Status: DISPENSED
Start: 2024-01-01

## (undated) RX ORDER — ASPIRIN 81 MG/1
TABLET, CHEWABLE ORAL
Status: DISPENSED
Start: 2024-01-01

## (undated) RX ORDER — DEXAMETHASONE SODIUM PHOSPHATE 10 MG/ML
INJECTION, SOLUTION INTRAMUSCULAR; INTRAVENOUS
Status: DISPENSED
Start: 2024-01-01

## (undated) RX ORDER — HEPARIN SODIUM 1000 [USP'U]/ML
INJECTION, SOLUTION INTRAVENOUS; SUBCUTANEOUS
Status: DISPENSED
Start: 2024-01-01

## (undated) RX ORDER — CEFAZOLIN SODIUM 1 G/3ML
INJECTION, POWDER, FOR SOLUTION INTRAMUSCULAR; INTRAVENOUS
Status: DISPENSED
Start: 2024-01-01

## (undated) RX ORDER — FENTANYL CITRATE-0.9 % NACL/PF 10 MCG/ML
PLASTIC BAG, INJECTION (ML) INTRAVENOUS
Status: DISPENSED
Start: 2024-01-01

## (undated) RX ORDER — PROPOFOL 10 MG/ML
INJECTION, EMULSION INTRAVENOUS
Status: DISPENSED
Start: 2024-01-01

## (undated) RX ORDER — ONDANSETRON 2 MG/ML
INJECTION INTRAMUSCULAR; INTRAVENOUS
Status: DISPENSED
Start: 2024-01-01

## (undated) RX ORDER — MIDAZOLAM HCL IN 0.9 % NACL/PF 1 MG/ML
PLASTIC BAG, INJECTION (ML) INTRAVENOUS
Status: DISPENSED
Start: 2024-01-01